# Patient Record
Sex: MALE | Race: WHITE | Employment: OTHER | URBAN - METROPOLITAN AREA
[De-identification: names, ages, dates, MRNs, and addresses within clinical notes are randomized per-mention and may not be internally consistent; named-entity substitution may affect disease eponyms.]

---

## 2018-07-01 ENCOUNTER — APPOINTMENT (OUTPATIENT)
Dept: GENERAL RADIOLOGY | Age: 53
End: 2018-07-01
Attending: PHYSICIAN ASSISTANT
Payer: COMMERCIAL

## 2018-07-01 ENCOUNTER — HOSPITAL ENCOUNTER (EMERGENCY)
Age: 53
Discharge: OTHER INSTITUTION | End: 2018-07-01
Attending: EMERGENCY MEDICINE | Admitting: EMERGENCY MEDICINE
Payer: COMMERCIAL

## 2018-07-01 ENCOUNTER — HOSPITAL ENCOUNTER (INPATIENT)
Age: 53
LOS: 10 days | Discharge: HOME | DRG: 025 | End: 2018-07-11
Attending: HOSPITALIST | Admitting: INTERNAL MEDICINE
Payer: COMMERCIAL

## 2018-07-01 ENCOUNTER — APPOINTMENT (OUTPATIENT)
Dept: CT IMAGING | Age: 53
End: 2018-07-01
Attending: PHYSICIAN ASSISTANT
Payer: COMMERCIAL

## 2018-07-01 VITALS
HEART RATE: 77 BPM | DIASTOLIC BLOOD PRESSURE: 119 MMHG | TEMPERATURE: 98.5 F | OXYGEN SATURATION: 94 % | HEIGHT: 75 IN | RESPIRATION RATE: 20 BRPM | BODY MASS INDEX: 26.73 KG/M2 | SYSTOLIC BLOOD PRESSURE: 211 MMHG | WEIGHT: 215 LBS

## 2018-07-01 DIAGNOSIS — I62.00 SUBDURAL HEMORRHAGE (HCC): Primary | ICD-10-CM

## 2018-07-01 PROBLEM — S06.5XAA SUBDURAL HEMATOMA: Status: ACTIVE | Noted: 2018-07-01

## 2018-07-01 LAB
ALBUMIN SERPL-MCNC: 4.4 G/DL (ref 3.5–5)
ALBUMIN/GLOB SERPL: 1.1 {RATIO} (ref 1.1–2.2)
ALP SERPL-CCNC: 141 U/L (ref 45–117)
ALT SERPL-CCNC: 25 U/L (ref 12–78)
ANION GAP SERPL CALC-SCNC: 7 MMOL/L (ref 5–15)
APTT PPP: 30 SEC (ref 22.1–32)
AST SERPL-CCNC: 20 U/L (ref 15–37)
BASOPHILS # BLD: 0.1 K/UL (ref 0–0.1)
BASOPHILS NFR BLD: 1 % (ref 0–1)
BILIRUB SERPL-MCNC: 1.5 MG/DL (ref 0.2–1)
BUN SERPL-MCNC: 14 MG/DL (ref 6–20)
BUN/CREAT SERPL: 14 (ref 12–20)
CALCIUM SERPL-MCNC: 8.9 MG/DL (ref 8.5–10.1)
CHLORIDE SERPL-SCNC: 101 MMOL/L (ref 97–108)
CK MB CFR SERPL CALC: 1.8 % (ref 0–2.5)
CK MB SERPL-MCNC: 2.5 NG/ML (ref 5–25)
CK SERPL-CCNC: 137 U/L (ref 39–308)
CO2 SERPL-SCNC: 29 MMOL/L (ref 21–32)
CREAT SERPL-MCNC: 0.97 MG/DL (ref 0.7–1.3)
CRP SERPL-MCNC: 0.32 MG/DL (ref 0–0.6)
DIFFERENTIAL METHOD BLD: ABNORMAL
EOSINOPHIL # BLD: 0.2 K/UL (ref 0–0.4)
EOSINOPHIL NFR BLD: 1 % (ref 0–7)
ERYTHROCYTE [DISTWIDTH] IN BLOOD BY AUTOMATED COUNT: 12.2 % (ref 11.5–14.5)
ERYTHROCYTE [SEDIMENTATION RATE] IN BLOOD: 7 MM/HR (ref 0–20)
GLOBULIN SER CALC-MCNC: 3.9 G/DL (ref 2–4)
GLUCOSE BLD STRIP.AUTO-MCNC: 117 MG/DL (ref 65–100)
GLUCOSE SERPL-MCNC: 110 MG/DL (ref 65–100)
HCT VFR BLD AUTO: 45.8 % (ref 36.6–50.3)
HGB BLD-MCNC: 16.3 G/DL (ref 12.1–17)
IMM GRANULOCYTES # BLD: 0 K/UL (ref 0–0.04)
IMM GRANULOCYTES NFR BLD AUTO: 0 % (ref 0–0.5)
INR BLD: 1 (ref 0.9–1.2)
INR PPP: 1 (ref 0.9–1.1)
LYMPHOCYTES # BLD: 2.1 K/UL (ref 0.8–3.5)
LYMPHOCYTES NFR BLD: 18 % (ref 12–49)
MCH RBC QN AUTO: 30.4 PG (ref 26–34)
MCHC RBC AUTO-ENTMCNC: 35.6 G/DL (ref 30–36.5)
MCV RBC AUTO: 85.3 FL (ref 80–99)
MONOCYTES # BLD: 1 K/UL (ref 0–1)
MONOCYTES NFR BLD: 9 % (ref 5–13)
NEUTS SEG # BLD: 8 K/UL (ref 1.8–8)
NEUTS SEG NFR BLD: 71 % (ref 32–75)
NRBC # BLD: 0 K/UL (ref 0–0.01)
NRBC BLD-RTO: 0 PER 100 WBC
PLATELET # BLD AUTO: 258 K/UL (ref 150–400)
PMV BLD AUTO: 9.8 FL (ref 8.9–12.9)
POTASSIUM SERPL-SCNC: 3.2 MMOL/L (ref 3.5–5.1)
PROT SERPL-MCNC: 8.3 G/DL (ref 6.4–8.2)
PROTHROMBIN TIME: 10.6 SEC (ref 9–11.1)
RBC # BLD AUTO: 5.37 M/UL (ref 4.1–5.7)
SERVICE CMNT-IMP: ABNORMAL
SODIUM SERPL-SCNC: 137 MMOL/L (ref 136–145)
THERAPEUTIC RANGE,PTTT: NORMAL SECS (ref 58–77)
TROPONIN I SERPL-MCNC: <0.05 NG/ML
TROPONIN I SERPL-MCNC: <0.05 NG/ML
WBC # BLD AUTO: 11.2 K/UL (ref 4.1–11.1)

## 2018-07-01 PROCEDURE — 74011000250 HC RX REV CODE- 250: Performed by: HOSPITALIST

## 2018-07-01 PROCEDURE — 99285 EMERGENCY DEPT VISIT HI MDM: CPT

## 2018-07-01 PROCEDURE — 74011250636 HC RX REV CODE- 250/636: Performed by: NEUROLOGICAL SURGERY

## 2018-07-01 PROCEDURE — 84484 ASSAY OF TROPONIN QUANT: CPT | Performed by: INTERNAL MEDICINE

## 2018-07-01 PROCEDURE — 80053 COMPREHEN METABOLIC PANEL: CPT | Performed by: PHYSICIAN ASSISTANT

## 2018-07-01 PROCEDURE — 74011250637 HC RX REV CODE- 250/637: Performed by: INTERNAL MEDICINE

## 2018-07-01 PROCEDURE — 85652 RBC SED RATE AUTOMATED: CPT | Performed by: INTERNAL MEDICINE

## 2018-07-01 PROCEDURE — 74011000250 HC RX REV CODE- 250: Performed by: EMERGENCY MEDICINE

## 2018-07-01 PROCEDURE — 74011250636 HC RX REV CODE- 250/636: Performed by: HOSPITALIST

## 2018-07-01 PROCEDURE — 85730 THROMBOPLASTIN TIME PARTIAL: CPT | Performed by: PHYSICIAN ASSISTANT

## 2018-07-01 PROCEDURE — 82962 GLUCOSE BLOOD TEST: CPT

## 2018-07-01 PROCEDURE — 85025 COMPLETE CBC W/AUTO DIFF WBC: CPT | Performed by: PHYSICIAN ASSISTANT

## 2018-07-01 PROCEDURE — 86140 C-REACTIVE PROTEIN: CPT | Performed by: INTERNAL MEDICINE

## 2018-07-01 PROCEDURE — 84484 ASSAY OF TROPONIN QUANT: CPT | Performed by: PHYSICIAN ASSISTANT

## 2018-07-01 PROCEDURE — 85610 PROTHROMBIN TIME: CPT

## 2018-07-01 PROCEDURE — 74011250636 HC RX REV CODE- 250/636: Performed by: INTERNAL MEDICINE

## 2018-07-01 PROCEDURE — 85610 PROTHROMBIN TIME: CPT | Performed by: PHYSICIAN ASSISTANT

## 2018-07-01 PROCEDURE — 65610000006 HC RM INTENSIVE CARE

## 2018-07-01 PROCEDURE — 74011000258 HC RX REV CODE- 258: Performed by: NEUROLOGICAL SURGERY

## 2018-07-01 PROCEDURE — 93005 ELECTROCARDIOGRAM TRACING: CPT

## 2018-07-01 PROCEDURE — 74011000250 HC RX REV CODE- 250: Performed by: INTERNAL MEDICINE

## 2018-07-01 PROCEDURE — 82550 ASSAY OF CK (CPK): CPT | Performed by: INTERNAL MEDICINE

## 2018-07-01 PROCEDURE — 71045 X-RAY EXAM CHEST 1 VIEW: CPT

## 2018-07-01 PROCEDURE — 74011000250 HC RX REV CODE- 250

## 2018-07-01 PROCEDURE — 74011250636 HC RX REV CODE- 250/636: Performed by: EMERGENCY MEDICINE

## 2018-07-01 PROCEDURE — 36415 COLL VENOUS BLD VENIPUNCTURE: CPT | Performed by: PHYSICIAN ASSISTANT

## 2018-07-01 PROCEDURE — 96374 THER/PROPH/DIAG INJ IV PUSH: CPT

## 2018-07-01 PROCEDURE — 36415 COLL VENOUS BLD VENIPUNCTURE: CPT | Performed by: INTERNAL MEDICINE

## 2018-07-01 PROCEDURE — 70450 CT HEAD/BRAIN W/O DYE: CPT

## 2018-07-01 RX ORDER — ACETAMINOPHEN 325 MG/1
650 TABLET ORAL
Status: DISCONTINUED | OUTPATIENT
Start: 2018-07-01 | End: 2018-07-04

## 2018-07-01 RX ORDER — IBUPROFEN 800 MG/1
800 TABLET ORAL
COMMUNITY
End: 2018-07-11

## 2018-07-01 RX ORDER — LABETALOL HYDROCHLORIDE 5 MG/ML
INJECTION, SOLUTION INTRAVENOUS
Status: COMPLETED
Start: 2018-07-01 | End: 2018-07-01

## 2018-07-01 RX ORDER — BISACODYL 5 MG
5 TABLET, DELAYED RELEASE (ENTERIC COATED) ORAL DAILY PRN
Status: DISCONTINUED | OUTPATIENT
Start: 2018-07-01 | End: 2018-07-11 | Stop reason: HOSPADM

## 2018-07-01 RX ORDER — OXYCODONE HYDROCHLORIDE 5 MG/1
5 TABLET ORAL
Status: DISCONTINUED | OUTPATIENT
Start: 2018-07-01 | End: 2018-07-04

## 2018-07-01 RX ORDER — ONDANSETRON 2 MG/ML
4 INJECTION INTRAMUSCULAR; INTRAVENOUS
Status: DISCONTINUED | OUTPATIENT
Start: 2018-07-01 | End: 2018-07-03

## 2018-07-01 RX ORDER — NALOXONE HYDROCHLORIDE 0.4 MG/ML
0.4 INJECTION, SOLUTION INTRAMUSCULAR; INTRAVENOUS; SUBCUTANEOUS AS NEEDED
Status: DISCONTINUED | OUTPATIENT
Start: 2018-07-01 | End: 2018-07-04

## 2018-07-01 RX ORDER — LABETALOL HYDROCHLORIDE 5 MG/ML
20 INJECTION, SOLUTION INTRAVENOUS
Status: COMPLETED | OUTPATIENT
Start: 2018-07-01 | End: 2018-07-01

## 2018-07-01 RX ORDER — SODIUM CHLORIDE 9 MG/ML
75 INJECTION, SOLUTION INTRAVENOUS CONTINUOUS
Status: DISCONTINUED | OUTPATIENT
Start: 2018-07-01 | End: 2018-07-03

## 2018-07-01 RX ORDER — DEXAMETHASONE SODIUM PHOSPHATE 4 MG/ML
4 INJECTION, SOLUTION INTRA-ARTICULAR; INTRALESIONAL; INTRAMUSCULAR; INTRAVENOUS; SOFT TISSUE EVERY 6 HOURS
Status: DISCONTINUED | OUTPATIENT
Start: 2018-07-02 | End: 2018-07-02

## 2018-07-01 RX ORDER — POTASSIUM CHLORIDE 14.9 MG/ML
10 INJECTION INTRAVENOUS
Status: COMPLETED | OUTPATIENT
Start: 2018-07-01 | End: 2018-07-04

## 2018-07-01 RX ADMIN — SODIUM CHLORIDE 11 MG/HR: 900 INJECTION, SOLUTION INTRAVENOUS at 20:10

## 2018-07-01 RX ADMIN — SODIUM CHLORIDE 8.5 MG/HR: 900 INJECTION, SOLUTION INTRAVENOUS at 22:47

## 2018-07-01 RX ADMIN — ACETAMINOPHEN 650 MG: 325 TABLET ORAL at 21:23

## 2018-07-01 RX ADMIN — SODIUM CHLORIDE 75 ML/HR: 900 INJECTION, SOLUTION INTRAVENOUS at 21:22

## 2018-07-01 RX ADMIN — POTASSIUM CHLORIDE 10 MEQ: 200 INJECTION, SOLUTION INTRAVENOUS at 23:24

## 2018-07-01 RX ADMIN — SODIUM CHLORIDE 500 MG: 900 INJECTION, SOLUTION INTRAVENOUS at 21:23

## 2018-07-01 RX ADMIN — LABETALOL HYDROCHLORIDE 20 MG: 5 INJECTION, SOLUTION INTRAVENOUS at 17:10

## 2018-07-01 RX ADMIN — ONDANSETRON 4 MG: 2 INJECTION INTRAMUSCULAR; INTRAVENOUS at 23:43

## 2018-07-01 RX ADMIN — OXYCODONE HYDROCHLORIDE 5 MG: 5 TABLET ORAL at 23:45

## 2018-07-01 RX ADMIN — FAMOTIDINE 20 MG: 10 INJECTION, SOLUTION INTRAVENOUS at 21:23

## 2018-07-01 RX ADMIN — DEXAMETHASONE SODIUM PHOSPHATE 4 MG: 4 INJECTION, SOLUTION INTRAMUSCULAR; INTRAVENOUS at 23:23

## 2018-07-01 NOTE — IP AVS SNAPSHOT
[address] [facility], [facility] [address] [place] [address] 
[phone] Patient: Bandar Garcia MRN: [id number] :1965 About your hospitalization You were admitted on:  2018 You last received care in the:  [facility] 6S NEURO-SCI TELE You were discharged on:  2018 Why you were hospitalized Your primary diagnosis was:  Subdural Hematoma (Hcc) Follow-up Information Follow up With Details Comments Contact Info Jeff Nettles MD On 2018 at 11:00 for staple removal. [place] [address] [facility] [id number] [phone] None   None ([phone]) Patient stated that they have no PCP Discharge Orders None A check corrina indicates which time of day the medication should be taken. My Medications START taking these medications Instructions Each Dose to Equal  
 Morning Noon Evening Bedtime  
 acetaminophen 325 mg tablet Commonly known as:  TYLENOL Your last dose was: Your next dose is: Take 2 Tabs by mouth every four (4) hours as needed. 650 mg  
    
   
   
   
  
 amLODIPine 10 mg tablet Commonly known as:  Seble Darting Start taking on:  2018 Your last dose was: Your next dose is: Take 1 Tab by mouth daily. 10 mg  
    
   
   
   
  
 carvedilol 6.25 mg tablet Commonly known as:  Ozie Bitter Your last dose was: Your next dose is: Take 1 Tab by mouth two (2) times daily (with meals). 6.25 mg  
    
   
   
   
  
 famotidine 20 mg tablet Commonly known as:  PEPCID Your last dose was: Your next dose is: Take 1 Tab by mouth every twelve (12) hours. 20 mg  
    
   
   
   
  
 hydrALAZINE 25 mg tablet Commonly known as:  APRESOLINE Your last dose was: Your next dose is: Take 1 Tab by mouth three (3) times daily.   
 25 mg  
    
   
 levETIRAcetam 1,000 mg tablet Your last dose was: Your next dose is: Take 1 Tab by mouth every twelve (12) hours. 1000 mg  
    
   
   
   
  
 lisinopril 40 mg tablet Commonly known as:  Ren Daniels Start taking on:  7/12/2018 Your last dose was: Your next dose is: Take 1 Tab by mouth daily. 40 mg  
    
   
   
   
  
  
STOP taking these medications   
 ibuprofen 800 mg tablet Commonly known as:  MOTRIN Where to Get Your Medications Information on where to get these meds will be given to you by the nurse or doctor. ! Ask your nurse or doctor about these medications  
  acetaminophen 325 mg tablet  
 amLODIPine 10 mg tablet  
 carvedilol 6.25 mg tablet  
 famotidine 20 mg tablet  
 hydrALAZINE 25 mg tablet  
 levETIRAcetam 1,000 mg tablet  
 lisinopril 40 mg tablet Discharge Instructions After Hospital Care Plan:  Discharge Instructions [facility] Patient Name: Zaria Lynch Date of procedure: 7/3/2018  Date of discharge: 7/9/2018 Procedure: Procedure(s): LEFT FRONTAL TEMPORAL CRANIOTOMY FOR SUBDURAL HEMATOMA  PCP: None Follow up appointments Follow up with your neurosurgeon in 10-14 days. Call [phone] to make an appointment as soon as you get home from the hospital.  Follow-up care is a key part of your treatment and safety. Be sure to make and go to all appointments, and call your doctor if you are having problems. It's also a good idea to know your test results and keep a list of the medicines you take. A craniotomy is surgery to open your skull to fix a problem in your brain. It can be done for many reasons. For example, you may need a craniotomy if your brain or blood vessels are damaged or if you have a tumor or an infection in your brain. You will probably feel very tired for several weeks after surgery.  You may also have headaches or problems concentrating. It can take 4 to 8 weeks to recover from surgery. Your cuts (incisions) may be sore for about 5 days after surgery. You may also have numbness and shooting pains near your wound, or swelling and bruising around your eyes. As your wound starts to heal, it may begin to itch. Medicines and ice packs can help with headaches, pain, swelling, and itching. The stitches that hold your incisions together may go away on their own or will be removed in 7 to 10 days. This depends on the type of stitches the doctor uses. Staples are usually removed in 10-14 days. It is common for your scalp to swell with fluid. After the swelling goes down, you may have a dent in your head. Some kinds of plates stay attached to hold the skull flap to your head. If your head was shaved, you may wear clean hats or scarves on your head until your hair grows back. This care sheet gives you a general idea about how long it will take for you to recover. But each person recovers at a different pace. Follow the steps below to get better as quickly as possible. When to call your Neurosurgeon:  You have trouble thinking clearly.  You are sleeping more than you are awake.  You have a fever with a stiff neck or a severe headache.  You have any sudden vision changes.  Nausea or vomiting, severe headache.  Loss of bowel or bladder function, inability to urinate.  Increased weakness-greater than before your surgery.  Severe pain or pain not relieved by medications.  Signs of a blood clot in your leg-calf pain, tenderness, redness, swelling of lower leg.  Signs of infection-if your incision is red; continues to have drainage; drainage has a foul odor or if you have a persistent fever over 101 degrees for 24 hours.  You fall and hit your head. When to call your Primary Care Physician:  Concerns about medical conditions such as diabetes, high blood pressure, asthma, congestive heart failure.  Call if blood sugars are elevated, persistent headache or dizziness, coughing or congestion, constipation or diarrhea, burning with urination, abnormal heart rate. When to call 911 and go to the nearest emergency room:  Acute onset of chest pain, shortness of breath, difficulty breathing  You passed out (lost consciousness).  It is hard to think, move, speak, or see.  Your body is jerking or shaking. Activity ? Rest when you feel tired. It is normal to want to sleep during the day. It is a good idea to plan to take a nap every day. Getting enough sleep will help you recover. ? Try not to lie flat when you rest or sleep. You can use a wedge pillow, or you can put a rolled towel or foam padding under your pillow. You can also raise the head of your bed by putting bricks or wooden blocks under the bed legs. ? After lying down, bring your head up slowly. This can prevent headaches or dizziness. ? Try to walk each day. Start by walking a little more than you did the day before. Bit by bit, increase the amount you walk. Walking boosts blood flow and helps prevent pneumonia and constipation. ? Avoid heavy lifting until your doctor says it is okay. ? Do not drive for 2 to 3 weeks or until your doctor says it is okay. When you begin driving again, start with short, familiar routes in the daytime. ? Ask your doctor if it is safe for you to travel by plane. ? Avoid risky activities, such as climbing a ladder, for 3 months after surgery. ? Avoid strenuous activities, such as bicycle riding, jogging, weight lifting, or aerobic exercise, for 3 months or until your doctor says it is okay. ? Do not play any rough or contact sports for 3 months or until your doctor says it is okay. ? You may engage in sexual activity. Diet ? Resume usual diet; drink plenty of fluids; eat foods high in fiber ? It is important to have regular bowel movements.   Pain medications may cause constipation. You may want to take a stool softener (such as Senokot-S or Colace) to prevent constipation. ? If constipation occurs, take a laxative (such as Dulcolax tablets, Milk of Magnesia, or a suppository). Laxatives should only be used if the above preventable measures have failed and you still have not had a bowel movement after three days ? Try to avoid constipation and straining with bowel movements. Incision Care     You can wash your hair 2 to 3 days after your surgery. But do not soak your head or swim for 2 to 3 weeks.  Do not dye or color your hair for 4 weeks after your surgery.  Do not rub or apply any lotions or ointments to your incision site.  Do not scrub your wound Medicines  If your doctor or nurse practitioner prescribed antibiotics, take them as directed. Do not stop taking them just because you feel better. You need to take the full course of antibiotics.  If you get medicines to prevent seizures, take them exactly as directed.  If the doctor or nurse practitioner gave you a prescription medicine for pain, take it as prescribed.  If you are not taking a prescription pain medicine, ask your doctor or nurse practitioner if you can take an over-the-counter medicine.   
Pain Medication Safety DO: 
 Read the Medication Guide  Take your medicine exactly as prescribed  Store your medicine away from children and in a safe place  Call your healthcare provider for medical advice about side effects. You may report side effects to FDA at 7-306-FDA-3231.  Please be aware that many medications contain Tylenol. We do not want you to over medicate so please read the information below as a guide. Do not take more than 4 Grams of Tylenol in a 24 hour period if you are under age 79 or 3 Grams in 24 hours if you are over 79years old. (There are 1000 milligrams in one Gram) o Percocet contains 325 mg of Tylenol per tablet (do not take more than 12 tablets in 24 hours) 
o Lortab contains 500 mg of Tylenol per tablet (do not take more than 8 tablets in 24 hours) o Norco contains 325 mg of Tylenol per tablet (do not take more than 12 tablets in 24 hours). DO NOT: 
 Do not give your medicine to others.  Do not take medicine unless it was prescribed for you.  Do not stop taking your medicine without talking to your healthcare provider.  Do not break, chew, crush, dissolve, or inject your medicine. If you cannot swallow your medicine whole, talk to your healthcare provider.  Do not drink alcohol while taking this medicine.  Do not take anti-inflammatory medications or aspirin unless instructed by your physician. Check BP every day, write down numbers, take to PCP for medication management. Goal BP top number <160 Introducing [facility] [place] [facility] SERVICES! Bryan Vicente introduces WorkshopLive patient portal. Now you can access parts of your medical record, email your doctor's office, and request medication refills online. 1. In your internet browser, go to [email or web address] 2. Click on the First Time User? Click Here link in the Sign In box. You will see the New Member Sign Up page. 3. Enter your WorkshopLive Access Code exactly as it appears below. You will not need to use this code after youve completed the sign-up process. If you do not sign up before the expiration date, you must request a new code. · [employer]Live Access Code: [email or web address] Expires: 9/29/2018  4:44 PM 
 
4. Enter the last four digits of your Social Security Number (xxxx) and Date of Birth (mm/dd/yyyy) as indicated and click Submit. You will be taken to the next sign-up page. 5. Create a WorkshopLive ID. This will be your WorkshopLive login ID and cannot be changed, so think of one that is secure and easy to remember. 6. Create a [employer]Live password. You can change your password at any time. 7. Enter your Password Reset Question and Answer.  This can be used at a later time if you forget your password. 8. Enter your e-mail address. You will receive e-mail notification when new information is available in [address] 9. Click Sign Up. You can now view and download portions of your medical record. 10. Click the Download Summary menu link to download a portable copy of your medical information. If you have questions, please visit the Frequently Asked Questions section of the [employer] website. Remember, Begun is NOT to be used for urgent needs. For medical emergencies, dial 911. Now available from your iPhone and Android! Introducing Vic Mcfadden As a [employer] patient, I wanted to make you aware of our electronic visit tool called Vic Mcfadden. [employer] allows you to connect within minutes with a medical provider 24 hours a day, seven days a week via a mobile device or tablet or logging into a secure website from your computer. You can access Vic Mcfadden from anywhere in the [place]. A virtual visit might be right for you when you have a simple condition and feel like you just dont want to get out of bed, or cant get away from work for an appointment, when your regular [facility] provider is not available (evenings, weekends or holidays), or when youre out of town and need minor care. Electronic visits cost only $49 and if the [employer] provider determines a prescription is needed to treat your condition, one can be electronically transmitted to a nearby pharmacy*. Please take a moment to enroll today if you have not already done so. The enrollment process is free and takes just a few minutes. To enroll, please download the [employer] jazz to your tablet or phone, or visit [email or web address] to enroll on your computer.    
And, as an [address] [place] [address] patient with a [employer] account, the results of your visits will be scanned into your electronic medical record and your primary care provider will be able to view the scanned results. We urge you to continue to see your regular [facility] provider for your ongoing medical care. And while your primary care provider may not be the one available when you seek a [employer] virtual visit, the peace of mind you get from getting a real diagnosis real time can be priceless. For more information on [employer]Space, view our Frequently Asked Questions (FAQs) at [email or web address]. Sincerely, 
 
Ariana Prakash MD 
Chief Medical Officer [employer] *:  certain medications cannot be prescribed via LapSpace Providers Seen During Your Hospitalization Provider Specialty Primary office phone Ana Hall MD Internal Medicine [phone] Usama Fitzgerald MD Internal Medicine [phone] Khang Pierre MD Internal Medicine [phone] Your Primary Care Physician (PCP) Primary Care Physician Office Phone Office Fax NONE ** None ** ** None ** You are allergic to the following No active allergies Recent Documentation Height Weight BMI Smoking Status 1.905 m 93.2 kg 25.67 kg/m2 Never Smoker Emergency Contacts Name Discharge Info Relation Home Work Mobile DISCHARGE CAREGIVER [3] Patient [10] Brenda Chambers  Spouse [3]   [phone] Patient Belongings The following personal items are in your possession at time of discharge: 
  Dental Appliances: None  Visual Aid: None      Home Medications: None   Jewelry: Ring (wedding band taped)  Clothing:  (no belongings to preop)    Other Valuables: None Please provide this summary of care documentation to your next provider. Signatures-by signing, you are acknowledging that this After Visit Summary has been reviewed with you and you have received a copy.   
  
 
  
    
    
 Patient Signature: ____________________________________________________________ Date:  ____________________________________________________________  
  
Sharon  Provider Signature:  ____________________________________________________________ Date:  ____________________________________________________________

## 2018-07-01 NOTE — ED PROVIDER NOTES
HPI Comments: Bandar Garcia is a 47 y.o. male  who presents by private vehicle to ER with c/o Patient presents with:  Migraine  Dysarthria. Patient presents with complaints of headache x 1 week with intermittent slurred speech, that started yesterday, and lasted approximately 10 minutes. Patient also had episode of slurred speech today approximately 1-2 hours ago that lasted a few minutes. Patient with no symptoms other then headache at this time on arrival. Patient denies any recent head trauma, or significant medical history. Patient does not take any daily medications. Patient reports last week he was swimming and diving down into 12 foot depths and the headache started that evening. Patient denies chest pain or shortness of breath. He specifically denies any fevers, chills, nausea, vomiting, chest pain, shortness of breath,  rash, diarrhea, abdominal pain, urinary/bowel changes, sweating or weight loss. PCP: None   PMHx significant for: History reviewed. No pertinent past medical history. PSHx significant for: History reviewed. No pertinent surgical history. Social Hx: Tobacco use: Smoking status: Never Smoker                                                              Smokeless status: Never Used                      ; EtOH use: The patient states he drinks 0 per week.; Illicit Drug use: Allergies:  No Known Allergies    There are no other complaints, changes or physical findings at this time. Patient is a 47 y.o. male presenting with migraines and context. The history is provided by the patient. Migraine    This is a new problem. The current episode started more than 2 days ago. The problem occurs constantly. The problem has not changed since onset. The headache is aggravated by nothing. The pain is located in the left unilateral region. The quality of the pain is described as sharp. The pain is at a severity of 3/10. The pain is mild.  Pertinent negatives include no anorexia, no fever, no malaise/fatigue, no chest pressure, no near-syncope, no orthopnea, no palpitations, no syncope, no shortness of breath, no weakness, no tingling, no dizziness, no visual change, no nausea and no vomiting. He has tried nothing for the symptoms. Dysarthria   Primary symptoms include speech difficulty. Pertinent negatives include no visual change. Associated symptoms include headaches. Pertinent negatives include no shortness of breath, no vomiting and no nausea. No past medical history on file. No past surgical history on file. No family history on file. Social History     Social History    Marital status: N/A     Spouse name: N/A    Number of children: N/A    Years of education: N/A     Occupational History    Not on file. Social History Main Topics    Smoking status: Not on file    Smokeless tobacco: Not on file    Alcohol use Not on file    Drug use: Not on file    Sexual activity: Not on file     Other Topics Concern    Not on file     Social History Narrative         ALLERGIES: Review of patient's allergies indicates no known allergies. Review of Systems   Constitutional: Negative. Negative for fever and malaise/fatigue. HENT: Negative. Eyes: Negative. Respiratory: Negative. Negative for shortness of breath. Cardiovascular: Negative. Negative for palpitations, orthopnea, syncope and near-syncope. Gastrointestinal: Negative. Negative for anorexia, nausea and vomiting. Endocrine: Negative. Genitourinary: Negative. Musculoskeletal: Negative. Skin: Negative. Allergic/Immunologic: Negative. Neurological: Positive for speech difficulty and headaches. Negative for dizziness, tingling and weakness. Hematological: Negative. Psychiatric/Behavioral: Negative. All other systems reviewed and are negative.       Vitals:    07/01/18 1646   Pulse: 95   Resp: 16   Temp: 98.5 °F (36.9 °C)   SpO2: 96%   Weight: 97.5 kg (215 lb)   Height: 6' 3\" (1.905 m) Physical Exam   Constitutional: He is oriented to person, place, and time. He appears well-developed and well-nourished. HENT:   Head: Normocephalic and atraumatic. Right Ear: External ear normal.   Left Ear: External ear normal.   Mouth/Throat: Oropharynx is clear and moist. No oropharyngeal exudate. Eyes: Conjunctivae and EOM are normal. Pupils are equal, round, and reactive to light. Right eye exhibits no discharge. Left eye exhibits no discharge. Right conjunctiva is not injected. Right conjunctiva has no hemorrhage. Left conjunctiva is not injected. Left conjunctiva has no hemorrhage. No scleral icterus. Right eye exhibits normal extraocular motion and no nystagmus. Left eye exhibits normal extraocular motion and no nystagmus. Right pupil is round and reactive. Left pupil is round and reactive. Pupils are equal.   Neck: Normal range of motion. Neck supple. No tracheal deviation present. No thyromegaly present. Cardiovascular: Normal rate, regular rhythm, normal heart sounds and intact distal pulses. No murmur heard. Pulmonary/Chest: Effort normal and breath sounds normal. No respiratory distress. He has no wheezes. He has no rales. Abdominal: Soft. Bowel sounds are normal. He exhibits no distension. There is no tenderness. There is no rebound and no guarding. Musculoskeletal: Normal range of motion. He exhibits no edema or tenderness. Lymphadenopathy:     He has no cervical adenopathy. Neurological: He is alert and oriented to person, place, and time. He has normal strength. No cranial nerve deficit or sensory deficit. Coordination normal. GCS eye subscore is 4. GCS verbal subscore is 5. GCS motor subscore is 6. Skin: Skin is warm. No rash noted. No erythema. Psychiatric: He has a normal mood and affect. His behavior is normal. Judgment and thought content normal.   Nursing note and vitals reviewed.        MDM  Number of Diagnoses or Management Options  Subdural hemorrhage ([facility] [address]): Diagnosis management comments: The patient presents with headache with a differential diagnosis of  cerebral hemorrhage, CVA, migraine, tension headache and vascular headache. Amount and/or Complexity of Data Reviewed  Clinical lab tests: ordered and reviewed  Tests in the radiology section of CPT®: ordered and reviewed  Tests in the medicine section of CPT®: ordered and reviewed  Discuss the patient with other providers: yes (Attending- Dr. Masha Bella who saw patient and agrees with plan)    Risk of Complications, Morbidity, and/or Mortality  Presenting problems: high  Diagnostic procedures: high  Management options: high    Critical Care  Total time providing critical care: 30-74 minutes    Patient Progress  Patient progress: stable        ED Course       Procedures                   Total critical care time spent exclusive of procedures:  35 minutes      CONSULT NOTE:   4:52 PM  Shiv Mitchell PA-C spoke with Dr. Shayna Dockery,   Specialty: Neurology  Discussed pt's hx, disposition, and available diagnostic and imaging results. Reviewed care plans. Consultant agrees with plans as outlined. Recommended admission for TIA work up. CONSULT NOTE:   5:25 PM  Dr. Masha Bella spoke with Dr. Lane Geiger,   Specialty: Neurosurgery  Discussed pt's hx, disposition, and available diagnostic and imaging results. Reviewed care plans. Consultant agrees with plans as outlined. Will accept for admission      CONSULT NOTE:   5:31 PM  Dr. Masha Bella spoke with Dr. Wil Ramos,   Specialty: Hospitalist  Discussed pt's hx, disposition, and available diagnostic and imaging results. Reviewed care plans. Consultant agrees with plans as outlined. Will accept patient for transfer. .    ED EKG interpretation:  Rhythm: normal sinus rhythm; and sinus arrhythmia. Rate (approx.): 82; Axis: left axis deviation; P wave: normal; QRS interval: normal ; ST/T wave: normal; in  Lead: ; Other findings: abnormal ekg.    EKG documented by Mag Palm mercedez GARCIA, as interpreted by Malachi Graf MD, ED MD.        Patient transferred to [facility] for admission.

## 2018-07-01 NOTE — IP AVS SNAPSHOT
[address] 
[phone] Patient: Francy Burgos MRN: [id number] :1965 A check corrina indicates which time of day the medication should be taken. My Medications START taking these medications Instructions Each Dose to Equal  
 Morning Noon Evening Bedtime  
 acetaminophen 325 mg tablet Commonly known as:  TYLENOL Your last dose was: Your next dose is: Take 2 Tabs by mouth every four (4) hours as needed. 650 mg  
    
   
   
   
  
 amLODIPine 10 mg tablet Commonly known as:  Jamal Sigala Start taking on:  2018 Your last dose was: Your next dose is: Take 1 Tab by mouth daily. 10 mg  
    
   
   
   
  
 carvedilol 6.25 mg tablet Commonly known as:  Shelby Dome Your last dose was: Your next dose is: Take 1 Tab by mouth two (2) times daily (with meals). 6.25 mg  
    
   
   
   
  
 famotidine 20 mg tablet Commonly known as:  PEPCID Your last dose was: Your next dose is: Take 1 Tab by mouth every twelve (12) hours. 20 mg  
    
   
   
   
  
 hydrALAZINE 25 mg tablet Commonly known as:  APRESOLINE Your last dose was: Your next dose is: Take 1 Tab by mouth three (3) times daily. 25 mg  
    
   
   
   
  
 levETIRAcetam 1,000 mg tablet Your last dose was: Your next dose is: Take 1 Tab by mouth every twelve (12) hours. 1000 mg  
    
   
   
   
  
 lisinopril 40 mg tablet Commonly known as:  Dimple Fernández Start taking on:  2018 Your last dose was: Your next dose is: Take 1 Tab by mouth daily. 40 mg  
    
   
   
   
  
  
STOP taking these medications   
 ibuprofen 800 mg tablet Commonly known as:  MOTRIN Where to Get Your Medications Information on where to get these meds will be given to you by the nurse or doctor. ! Ask your nurse or doctor about these medications  
  acetaminophen 325 mg tablet  
 amLODIPine 10 mg tablet  
 carvedilol 6.25 mg tablet  
 famotidine 20 mg tablet  
 hydrALAZINE 25 mg tablet  
 levETIRAcetam 1,000 mg tablet  
 lisinopril 40 mg tablet

## 2018-07-01 NOTE — PROGRESS NOTES
Spiritual Care Assessment/Progress Note  [address]      NAME: Kingsley Rocha      MRN: [id number]  AGE: 47 y.o. SEX: male  Sikhism Affiliation:    Language:      7/1/2018     Total Time (in minutes): 10     Spiritual Assessment begun in [facility] EMERGENCY DEPT through conversation with:         []Patient        [x] Family    [] Friend(s)        Reason for Consult: Emergency Department visit, Other (comment) (Code S)     Spiritual beliefs: (Please include comment if needed)     [x] Identifies with a noe tradition:         [x] Supported by a noe community:            [] Claims no spiritual orientation:           [] Seeking spiritual identity:                [] Adheres to an individual form of spirituality:           [] Not able to assess:                           Identified resources for coping:      [] Prayer                               [] Music                  [] Guided Imagery     [] Family/friends                 [] Pet visits     [] Devotional reading                         [] Unknown     [] Other:                                            Interventions offered during this visit: (See comments for more details)          Family/Friend(s): Affirmation of noe, Iconic (affirming the presence of God/Higher Power), Prayer (assurance of)     Plan of Care:     [] Support spiritual and/or cultural needs    [] Support AMD and/or advance care planning process      [] Support grieving process   [] Coordinate Rites and/or Rituals    [] Coordination with community clergy   [] No spiritual needs identified at this time   [] Detailed Plan of Care below (See Comments)  [] Make referral to Music Therapy  [] Make referral to Pet Therapy     [] Make referral to Addiction services  [] Make referral to [facility]  [] Make referral to Spiritual Care Partner  [] No future visits requested        [x] Follow up visits as needed     Comments: responded to Code S in ER.   Mr. Mehul Bellamy' wife and their friend/ were present. Mr. Kirk Merlos was out for tests. They have no spiritual needs at this time.   Advised of  Availability   Visited by: Zander Melendez [address] [facility] Jadon ([id number])

## 2018-07-01 NOTE — ED TRIAGE NOTES
The patient states he developed a headache 1 week ago and has experienced some slurred speech yesterday lasting approximately 10 minutes. He states he also experienced an episode of slurred speech approximately 1 hour prior to arrival that was brief in nature. His speech is now clear and he is able to move all extremities without difficulty.

## 2018-07-02 ENCOUNTER — APPOINTMENT (OUTPATIENT)
Dept: CT IMAGING | Age: 53
DRG: 025 | End: 2018-07-02
Attending: NEUROLOGICAL SURGERY
Payer: COMMERCIAL

## 2018-07-02 LAB
ALBUMIN SERPL-MCNC: 4 G/DL (ref 3.5–5)
ALBUMIN/GLOB SERPL: 1.1 {RATIO} (ref 1.1–2.2)
ALP SERPL-CCNC: 130 U/L (ref 45–117)
ALT SERPL-CCNC: 23 U/L (ref 12–78)
AMPHET UR QL SCN: NEGATIVE
ANION GAP SERPL CALC-SCNC: 11 MMOL/L (ref 5–15)
AST SERPL-CCNC: 18 U/L (ref 15–37)
BARBITURATES UR QL SCN: NEGATIVE
BASOPHILS # BLD: 0 K/UL (ref 0–0.1)
BASOPHILS NFR BLD: 0 % (ref 0–1)
BENZODIAZ UR QL: NEGATIVE
BILIRUB SERPL-MCNC: 1.8 MG/DL (ref 0.2–1)
BUN SERPL-MCNC: 18 MG/DL (ref 6–20)
BUN/CREAT SERPL: 19 (ref 12–20)
CALCIUM SERPL-MCNC: 8.7 MG/DL (ref 8.5–10.1)
CANNABINOIDS UR QL SCN: NEGATIVE
CHLORIDE SERPL-SCNC: 104 MMOL/L (ref 97–108)
CHOLEST SERPL-MCNC: 147 MG/DL
CK MB CFR SERPL CALC: 1.1 % (ref 0–2.5)
CK MB SERPL-MCNC: 1.3 NG/ML (ref 5–25)
CK SERPL-CCNC: 118 U/L (ref 39–308)
CO2 SERPL-SCNC: 20 MMOL/L (ref 21–32)
COCAINE UR QL SCN: NEGATIVE
CREAT SERPL-MCNC: 0.95 MG/DL (ref 0.7–1.3)
DIFFERENTIAL METHOD BLD: ABNORMAL
DRUG SCRN COMMENT,DRGCM: NORMAL
EOSINOPHIL # BLD: 0 K/UL (ref 0–0.4)
EOSINOPHIL NFR BLD: 0 % (ref 0–7)
ERYTHROCYTE [DISTWIDTH] IN BLOOD BY AUTOMATED COUNT: 12.9 % (ref 11.5–14.5)
EST. AVERAGE GLUCOSE BLD GHB EST-MCNC: 97 MG/DL
GLOBULIN SER CALC-MCNC: 3.5 G/DL (ref 2–4)
GLUCOSE SERPL-MCNC: 166 MG/DL (ref 65–100)
HBA1C MFR BLD: 5 % (ref 4.2–6.3)
HCT VFR BLD AUTO: 42 % (ref 36.6–50.3)
HDLC SERPL-MCNC: 42 MG/DL
HDLC SERPL: 3.5 {RATIO} (ref 0–5)
HGB BLD-MCNC: 15 G/DL (ref 12.1–17)
IMM GRANULOCYTES # BLD: 0 K/UL (ref 0–0.04)
IMM GRANULOCYTES NFR BLD AUTO: 0 % (ref 0–0.5)
LDLC SERPL CALC-MCNC: 89.8 MG/DL (ref 0–100)
LIPID PROFILE,FLP: NORMAL
LYMPHOCYTES # BLD: 0.8 K/UL (ref 0.8–3.5)
LYMPHOCYTES NFR BLD: 7 % (ref 12–49)
MAGNESIUM SERPL-MCNC: 2.1 MG/DL (ref 1.6–2.4)
MCH RBC QN AUTO: 31.4 PG (ref 26–34)
MCHC RBC AUTO-ENTMCNC: 35.7 G/DL (ref 30–36.5)
MCV RBC AUTO: 88.1 FL (ref 80–99)
METHADONE UR QL: NEGATIVE
MONOCYTES # BLD: 0.2 K/UL (ref 0–1)
MONOCYTES NFR BLD: 2 % (ref 5–13)
NEUTS SEG # BLD: 10.3 K/UL (ref 1.8–8)
NEUTS SEG NFR BLD: 91 % (ref 32–75)
NRBC # BLD: 0 K/UL (ref 0–0.01)
NRBC BLD-RTO: 0 PER 100 WBC
OPIATES UR QL: NEGATIVE
PCP UR QL: NEGATIVE
PHOSPHATE SERPL-MCNC: 2 MG/DL (ref 2.6–4.7)
PLATELET # BLD AUTO: 222 K/UL (ref 150–400)
PMV BLD AUTO: 10.1 FL (ref 8.9–12.9)
POTASSIUM SERPL-SCNC: 4.1 MMOL/L (ref 3.5–5.1)
POTASSIUM SERPL-SCNC: 4.2 MMOL/L (ref 3.5–5.1)
PROT SERPL-MCNC: 7.5 G/DL (ref 6.4–8.2)
RBC # BLD AUTO: 4.77 M/UL (ref 4.1–5.7)
RBC MORPH BLD: ABNORMAL
SODIUM SERPL-SCNC: 135 MMOL/L (ref 136–145)
TRIGL SERPL-MCNC: 76 MG/DL (ref ?–150)
TROPONIN I SERPL-MCNC: <0.05 NG/ML
TSH SERPL DL<=0.05 MIU/L-ACNC: 0.54 UIU/ML (ref 0.36–3.74)
VLDLC SERPL CALC-MCNC: 15.2 MG/DL
WBC # BLD AUTO: 11.3 K/UL (ref 4.1–11.1)

## 2018-07-02 PROCEDURE — 74011636320 HC RX REV CODE- 636/320: Performed by: HOSPITALIST

## 2018-07-02 PROCEDURE — 74011250636 HC RX REV CODE- 250/636: Performed by: NEUROLOGICAL SURGERY

## 2018-07-02 PROCEDURE — 80061 LIPID PANEL: CPT | Performed by: INTERNAL MEDICINE

## 2018-07-02 PROCEDURE — 74011250636 HC RX REV CODE- 250/636: Performed by: INTERNAL MEDICINE

## 2018-07-02 PROCEDURE — 84100 ASSAY OF PHOSPHORUS: CPT | Performed by: INTERNAL MEDICINE

## 2018-07-02 PROCEDURE — 84443 ASSAY THYROID STIM HORMONE: CPT | Performed by: INTERNAL MEDICINE

## 2018-07-02 PROCEDURE — 92610 EVALUATE SWALLOWING FUNCTION: CPT | Performed by: SPEECH-LANGUAGE PATHOLOGIST

## 2018-07-02 PROCEDURE — 74011000258 HC RX REV CODE- 258: Performed by: NEUROLOGICAL SURGERY

## 2018-07-02 PROCEDURE — 74011000250 HC RX REV CODE- 250: Performed by: FAMILY MEDICINE

## 2018-07-02 PROCEDURE — 70450 CT HEAD/BRAIN W/O DYE: CPT

## 2018-07-02 PROCEDURE — 84484 ASSAY OF TROPONIN QUANT: CPT | Performed by: INTERNAL MEDICINE

## 2018-07-02 PROCEDURE — 84132 ASSAY OF SERUM POTASSIUM: CPT | Performed by: INTERNAL MEDICINE

## 2018-07-02 PROCEDURE — 74011250636 HC RX REV CODE- 250/636: Performed by: FAMILY MEDICINE

## 2018-07-02 PROCEDURE — 82550 ASSAY OF CK (CPK): CPT | Performed by: INTERNAL MEDICINE

## 2018-07-02 PROCEDURE — 65610000006 HC RM INTENSIVE CARE

## 2018-07-02 PROCEDURE — 70496 CT ANGIOGRAPHY HEAD: CPT

## 2018-07-02 PROCEDURE — 77030018836 HC SOL IRR NACL ICUM -A

## 2018-07-02 PROCEDURE — 36415 COLL VENOUS BLD VENIPUNCTURE: CPT | Performed by: INTERNAL MEDICINE

## 2018-07-02 PROCEDURE — 80053 COMPREHEN METABOLIC PANEL: CPT | Performed by: INTERNAL MEDICINE

## 2018-07-02 PROCEDURE — 77010033678 HC OXYGEN DAILY

## 2018-07-02 PROCEDURE — 74011000258 HC RX REV CODE- 258: Performed by: HOSPITALIST

## 2018-07-02 PROCEDURE — 74011000250 HC RX REV CODE- 250: Performed by: INTERNAL MEDICINE

## 2018-07-02 PROCEDURE — 85025 COMPLETE CBC W/AUTO DIFF WBC: CPT | Performed by: INTERNAL MEDICINE

## 2018-07-02 PROCEDURE — 83735 ASSAY OF MAGNESIUM: CPT | Performed by: INTERNAL MEDICINE

## 2018-07-02 PROCEDURE — 80307 DRUG TEST PRSMV CHEM ANLYZR: CPT | Performed by: INTERNAL MEDICINE

## 2018-07-02 PROCEDURE — 74011250637 HC RX REV CODE- 250/637: Performed by: INTERNAL MEDICINE

## 2018-07-02 PROCEDURE — 83036 HEMOGLOBIN GLYCOSYLATED A1C: CPT | Performed by: INTERNAL MEDICINE

## 2018-07-02 RX ORDER — SODIUM CHLORIDE 0.9 % (FLUSH) 0.9 %
SYRINGE (ML) INJECTION
Status: COMPLETED
Start: 2018-07-02 | End: 2018-07-02

## 2018-07-02 RX ORDER — FENTANYL CITRATE 50 UG/ML
25 INJECTION, SOLUTION INTRAMUSCULAR; INTRAVENOUS
Status: DISCONTINUED | OUTPATIENT
Start: 2018-07-02 | End: 2018-07-04

## 2018-07-02 RX ORDER — HYDRALAZINE HYDROCHLORIDE 20 MG/ML
10 INJECTION INTRAMUSCULAR; INTRAVENOUS
Status: DISCONTINUED | OUTPATIENT
Start: 2018-07-02 | End: 2018-07-04

## 2018-07-02 RX ORDER — SODIUM CHLORIDE 0.9 % (FLUSH) 0.9 %
10 SYRINGE (ML) INJECTION
Status: COMPLETED | OUTPATIENT
Start: 2018-07-02 | End: 2018-07-02

## 2018-07-02 RX ADMIN — Medication 10 ML: at 02:23

## 2018-07-02 RX ADMIN — SODIUM CHLORIDE 100 ML: 900 INJECTION, SOLUTION INTRAVENOUS at 02:22

## 2018-07-02 RX ADMIN — POTASSIUM CHLORIDE 10 MEQ: 200 INJECTION, SOLUTION INTRAVENOUS at 00:32

## 2018-07-02 RX ADMIN — HYDRALAZINE HYDROCHLORIDE 10 MG: 20 INJECTION INTRAMUSCULAR; INTRAVENOUS at 14:21

## 2018-07-02 RX ADMIN — SODIUM PHOSPHATE, MONOBASIC, MONOHYDRATE: 276; 142 INJECTION, SOLUTION INTRAVENOUS at 06:50

## 2018-07-02 RX ADMIN — IOPAMIDOL 100 ML: 755 INJECTION, SOLUTION INTRAVENOUS at 02:23

## 2018-07-02 RX ADMIN — POTASSIUM CHLORIDE 10 MEQ: 200 INJECTION, SOLUTION INTRAVENOUS at 01:47

## 2018-07-02 RX ADMIN — OXYCODONE HYDROCHLORIDE 5 MG: 5 TABLET ORAL at 05:12

## 2018-07-02 RX ADMIN — ONDANSETRON 4 MG: 2 INJECTION INTRAMUSCULAR; INTRAVENOUS at 16:31

## 2018-07-02 RX ADMIN — FAMOTIDINE 20 MG: 10 INJECTION, SOLUTION INTRAVENOUS at 10:02

## 2018-07-02 RX ADMIN — FENTANYL CITRATE 25 MCG: 50 INJECTION, SOLUTION INTRAMUSCULAR; INTRAVENOUS at 17:49

## 2018-07-02 RX ADMIN — SODIUM CHLORIDE 6 MG/HR: 900 INJECTION, SOLUTION INTRAVENOUS at 01:47

## 2018-07-02 RX ADMIN — SODIUM CHLORIDE 75 ML/HR: 900 INJECTION, SOLUTION INTRAVENOUS at 19:55

## 2018-07-02 RX ADMIN — Medication: at 15:00

## 2018-07-02 RX ADMIN — DEXAMETHASONE SODIUM PHOSPHATE 4 MG: 4 INJECTION, SOLUTION INTRAMUSCULAR; INTRAVENOUS at 05:12

## 2018-07-02 RX ADMIN — ACETAMINOPHEN 650 MG: 325 TABLET ORAL at 14:19

## 2018-07-02 RX ADMIN — SODIUM CHLORIDE 1000 MG: 900 INJECTION, SOLUTION INTRAVENOUS at 21:50

## 2018-07-02 RX ADMIN — OXYCODONE HYDROCHLORIDE 5 MG: 5 TABLET ORAL at 12:22

## 2018-07-02 RX ADMIN — HYDRALAZINE HYDROCHLORIDE 10 MG: 20 INJECTION INTRAMUSCULAR; INTRAVENOUS at 13:29

## 2018-07-02 RX ADMIN — SODIUM CHLORIDE 5 MG/HR: 900 INJECTION, SOLUTION INTRAVENOUS at 19:53

## 2018-07-02 RX ADMIN — HYDRALAZINE HYDROCHLORIDE 10 MG: 20 INJECTION INTRAMUSCULAR; INTRAVENOUS at 16:25

## 2018-07-02 RX ADMIN — SODIUM CHLORIDE 1000 MG: 900 INJECTION, SOLUTION INTRAVENOUS at 10:02

## 2018-07-02 RX ADMIN — FAMOTIDINE 20 MG: 10 INJECTION, SOLUTION INTRAVENOUS at 21:50

## 2018-07-02 NOTE — PROGRESS NOTES
1740 received verbal report from ED RN at [facility] - using sbar- pt bp elevated at that time but neuro status stable  1900 pt arrived in ICU on cardene at 27.3 mg bp systolic over 586 - awaiting orders from physician- dual skin assessment done chg bath done and neuro check done wife in and updated-  1930 shift report given to [address] in to see pt

## 2018-07-02 NOTE — CDMP QUERY
[facility],     Please clarify if this patient is (was) being treated/managed for:     => Cerebral edema and brain compression in the setting of SDH requiring monitoring, IV steroids,   => Other explanation of clinical findings  => Clinically Undetermined (no explanation for clinical findings)    The medical record reflects the following clinical findings, treatment, and risk factors. Risk Factors:  48 y/o pt  Clinical Indicators:  Per Ct head: Armada Mellow Unchanged left  subdural hemorrhage with minimal midline shift to the right. Treatment: IV steroids, monitoring    Please clarify and document your clinical opinion in the progress notes and discharge summary including the definitive and/or presumptive diagnosis, (suspected or probable), related to the above clinical findings. Please include clinical findings supporting your diagnosis.     Thank you,  Billy Torres  [facility]  [phone]

## 2018-07-02 NOTE — PROGRESS NOTES
1622. Pt having episode of nausea and vomiting. Pt c/o of headache pain, states \"this feels different\". Dr. Marysol Waller paged about patient condition. Prn Zofran given. Prn hydralazine given     1629. Spoke with Dr. Marysol Waller. Will take the patient for a stat head CT     1655. Cardene gtt restarted at 5mg. Will monitor closely. 1700. Patient back from CT. Awaiting CT results    2045. Spoke with Dr. Marysol Waller. Updated her about patient current condition. Orders received. Clarified blood pressure parameters per MD. Will maintain SBP less than 160. Cardene gtt infusing @ 5mg/hr. Will monitor closely.

## 2018-07-02 NOTE — PROGRESS NOTES
Brief note full report to follow. Recommend regular diet. Will follow for language evaluation. Thanks. Devon Abrams M.CD.  CCC-SLP

## 2018-07-02 NOTE — PROGRESS NOTES
Hospitalist Progress Note  Carrie Lora MD  Answering service: [phone] from in house phone  Cell: [phone]      Date of Service:  2018  NAME:  Javi Peres  :  1965  MRN:  [id number]      Admission Summary:   79-year-old man without any significant past medical history who was in his usual state of health until about a week ago when the patient developed a headache. The headache is located at the back of the head, intermittent, a dull ache. No known aggravating or relieving factors. Before the onset of the headache, the patient was swimming in the swimming pool and also diving down into 12 foot depth. The headache did not start immediately, but the headache started later on in the evening. The patient has been having the headache intermittently since this swimming episode. On the day of presentation at the emergency room, the headache became associated with slurred speech. Because of that, the patient went to the Emergency Room at [place] [address]. When the patient arrived at the emergency room, a CT scan of the head was obtained. The CT scan shows a subdural hematoma with mass effect. Interval history / Subjective:     Still has a headache but better. Medication is making him sleepy     Assessment & Plan:     1.  Subdural hematoma. On Keppra started by the neurosurgeon. Repeat CT scan of the head and CTA of the head did not show any interval changes. 2.  Hypokalemia. Resolved  3. Elevated blood pressure. Will continue with a Cardene drip as advised by the neurosurgeon.       Code status: Full  DVT prophylaxis: SCD    Care Plan discussed with: Patient/Family and Nurse  Disposition: TBD     Hospital Problems  Date Reviewed: 2018          Codes Class Noted POA    * (Principal)[facility] ICD-10-CM: I62.00  ICD-9-CM: 432.1  2018 Yes                Review of Systems:   A comprehensive review of systems was negative. Vital Signs:    Last 24hrs VS reviewed since prior progress note. Most recent are:  Visit Vitals    BP (!) 148/94    Pulse (!) 54    Temp 99.1 °F (37.3 °C)    Resp 14    Wt 97.7 kg (215 lb 6.2 oz)    SpO2 100%    BMI 26.92 kg/m2         Intake/Output Summary (Last 24 hours) at 07/02/18 1224  Last data filed at 07/02/18 7746   Gross per 24 hour   Intake          2404.84 ml   Output              925 ml   Net          1479.84 ml        Physical Examination:             Constitutional:  No acute distress, cooperative, pleasant    ENT:  Oral mucous moist, oropharynx benign. Neck supple,    Resp:  CTA bilaterally. No wheezing/rhonchi/rales. No accessory muscle use   CV:  Regular rhythm, normal rate, no murmurs, gallops, rubs    GI:  Soft, non distended, non tender. normoactive bowel sounds, no hepatosplenomegaly     Musculoskeletal:  No edema, warm, 2+ pulses throughout    Neurologic:  Moves all extremities. AAOx3, CN II-XII reviewed            Data Review:    Review and/or order of clinical lab test      Labs:     Recent Labs      07/02/18   0630  07/01/18   1653   WBC  11.3*  11.2*   HGB  15.0  16.3   HCT  42.0  45.8   PLT  222  258     Recent Labs      07/02/18   0242  07/01/18   1653   NA  135*  137   K  4.2  4.1  3.2*   CL  104  101   CO2  20*  29   BUN  18  14   CREA  0.95  0.97   GLU  166*  110*   CA  8.7  8.9   MG  2.1   --    PHOS  2.0*   --      Recent Labs      07/02/18   0242  07/01/18   1653   SGOT  18  20   ALT  23  25   AP  130*  141*   TBILI  1.8*  1.5*   TP  7.5  8.3*   ALB  4.0  4.4   GLOB  3.5  3.9     Recent Labs      07/01/18   1653  07/01/18   1651   INR  1.0  1.0   PTP  10.6   --    APTT  30.0   --       No results for input(s): FE, TIBC, PSAT, FERR in the last 72 hours. No results found for: FOL, RBCF   No results for input(s): PH, PCO2, PO2 in the last 72 hours.   Recent Labs      07/02/18   0242  07/01/18   2146  07/01/18   1653   CPK  118  137   -- CKNDX  1.1  1.8   --    TROIQ  <0.05  <0.05  <0.05     Lab Results   Component Value Date/Time    Cholesterol, total 147 07/02/2018 02:42 AM    HDL Cholesterol 42 07/02/2018 02:42 AM    LDL, calculated 89.8 07/02/2018 02:42 AM    Triglyceride 76 07/02/2018 02:42 AM    CHOL/HDL Ratio 3.5 07/02/2018 02:42 AM     Lab Results   Component Value Date/Time    Glucose (POC) 117 (H) 07/01/2018 04:50 PM     No results found for: COLOR, APPRN, SPGRU, REFSG, COLETTE, PROTU, GLUCU, KETU, BILU, UROU, MARGARETTE, LEUKU, GLUKE, EPSU, BACTU, WBCU, RBCU, CASTS, UCRY      Medications Reviewed:     Current Facility-Administered Medications   Medication Dose Route Frequency    sodium phosphate 15 mmol in 0.9% sodium chloride 250 mL infusion   IntraVENous ONCE    levETIRAcetam (KEPPRA) 1,000 mg in 0.9% sodium chloride 100 mL IVPB  1,000 mg IntraVENous Q12H    hydrALAZINE (APRESOLINE) 20 mg/mL injection 10 mg  10 mg IntraVENous Q1H PRN    niCARdipine (CARDENE) 25 mg in 0.9% sodium chloride 250 mL infusion  5-15 mg/hr IntraVENous TITRATE    ondansetron (ZOFRAN) injection 4 mg  4 mg IntraVENous Q6H PRN    naloxone (NARCAN) injection 0.4 mg  0.4 mg IntraVENous PRN    0.9% sodium chloride infusion  75 mL/hr IntraVENous CONTINUOUS    acetaminophen (TYLENOL) tablet 650 mg  650 mg Oral Q6H PRN    oxyCODONE IR (ROXICODONE) tablet 5 mg  5 mg Oral Q4H PRN    bisacodyl (DULCOLAX) tablet 5 mg  5 mg Oral DAILY PRN    famotidine (PF) (PEPCID) 20 mg in sodium chloride 0.9% 10 mL injection  20 mg IntraVENous Q12H     ______________________________________________________________________  EXPECTED LENGTH OF STAY: 2d 4h  ACTUAL LENGTH OF STAY:          1                 Angel Valdes MD

## 2018-07-02 NOTE — CONSULTS
Full note dictated. Repeat head CT in am with CTA. Agree with q one hour neuro checks overnight and cardene gtt to keep SBP < 160. Thank you for this consultation.

## 2018-07-02 NOTE — H&P
[address]   HISTORY AND PHYSICAL      Howard Medina  MR#: [id number]  : 1965  ACCOUNT #: [de-identified]   ADMIT DATE: 2018    Date of admission:  2018. PRIMARY CARE PHYSICIAN:  None. SOURCE OF INFORMATION:  The patient. CHIEF COMPLAINT:  Headache. HISTORY OF PRESENT ILLNESS:  This is a 79-year-old man without any significant past medical history who was in his usual state of health until about a week ago when the patient developed a headache. The headache is located at the back of the head, intermittent, a dull ache. No known aggravating or relieving factors. Before the onset of the headache, the patient was swimming in the swimming pool and also diving down into 12 foot depth. The headache did not start immediately, but the headache started later on in the evening. The patient has been having the headache intermittently since this swimming episode. On the day of presentation at the emergency room, the headache became associated with slurred speech. Because of that, the patient went to the Emergency Room at [facility]. When the patient arrived at the emergency room, a CT scan of the head was obtained. The CT scan shows a subdural hematoma with mass effect. The emergency room physician consulted neurosurgeon at [facility], and transfer to [facility] was advised. The hospitalist service was asked to directly admit the patient from [facility] to the intensive care unit at [address].  The patient denies shortness of breath. No chest pain, no fever, no rigors, no chills. The patient's blood pressure was markedly elevated when he arrived at the emergency room, was started on a Cardene drip as advised by the neurosurgeon. PAST SURGICAL HISTORY:  Not significant. ALLERGIES:  NO KNOWN DRUG ALLERGIES. MEDICATIONS:  Motrin 800 mg as needed. FAMILY HISTORY:  This was reviewed.   His mother had hypertension. PAST SURGICAL HISTORY: Not significant. SOCIAL HISTORY:  No history of alcohol or tobacco abuse. REVIEW OF SYSTEMS:    HEENT:   This is positive for headache and slurred speech. No blurring of vision, no photophobia. RESPIRATORY SYSTEM:  No cough, no shortness of breath, no hemoptysis. CARDIOVASCULAR:  No chest pain, no orthopnea, no palpitation. GASTROINTESTINAL:  No nausea, vomiting, no diarrhea, no constipation. GENITOURINARY:  No dysuria, no urgency, no frequency. All other systems are reviewed and they are negative. PHYSICAL EXAMINATION:  GENERAL:  The patient appeared ill and in moderate distress. VITAL SIGNS:  Temperature of 99.2, pulse 92, blood pressure 176/96, oxygen saturation 96% on room air, respiratory rate 16. HEAD:  Normocephalic, atraumatic. EYES:  Normal eye movement, no redness, no drainage, no discharge. EARS:  Normal external ears with no obvious drainage. NOSE:  No deformity and no drainage. MOUTH AND THROAT:  No visible oral lesion. NECK:  Supple, no JVD, no thyromegaly. CHEST:  Clear breath sounds. No wheezing, no crackles. HEART:  Normal S1 and S2 are regular. No clinically appreciable murmur. ABDOMEN:  Soft, nontender, normal bowel sounds. CENTRAL NERVOUS SYSTEM:  Alert, oriented x3, no gross focal neurological deficit. EXTREMITIES:  No edema. Pulses 2+ bilaterally. MUSCULOSKELETAL:  No obvious joint deformity or swelling. SKIN:  No active skin lesions seen in the exposed part of the body. PSYCHIATRIC:  Normal mood and affect. LYMPHATIC SYSTEM:  No cervical lymphadenopathy. DIAGNOSTIC DATA:  EKG shows sinus rhythm, no significant ST and T-wave abnormalities. Chest x-ray:  No acute pathology. A CT scan of the head without contrast shows acute/subacute left subdural hematoma with mass effect including a 6 mm contralateral subfalcine herniation.     LABORATORY DATA:  Hematology:  WBC 11.2, hemoglobin 15.3, hematocrit 45.8, platelets 700. Coagulation profile:  INR 1.0, PT 10.6, PTT 30.0. Chemistry:  Sodium 137, potassium 3.2, chloride 102, CO2 of 29, glucose 110, BUN 14, creatinine 0.97, calcium 8.9, bilirubin total 1.5, total protein 8.3, albumin level 4.4, globulin 3.9, ALT 25, AST 20, alkaline phosphatase 141. Cardiac profile:  Troponin less than 0.05.    ASSESSMENT:  1.  Subdural hematoma. 2.  Hypokalemia. 3.  Elevated blood pressure. PLAN:  1.  Subdural hematoma. We will admit the patient into the intensive care unit. We will start the patient on Decadron. We will continue with Keppra started by the neurosurgeon. We will repeat CT scan of the head and CTA of the head as advised by the neurosurgeon. 2.  Hypokalemia. We will replace potassium. Repeat potassium level. We also check magnesium level. 3.  Elevated blood pressure. The patient denies a history of hypertension. We will continue with a Cardene drip as advised by the neurosurgeon. We will check a TSH level, check cardiac markers. 4.  Other issues:    CODE STATUS:  THE PATIENT IS A FULL CODE. We will request SCD for DVT prophylaxis.       MD BANG Maurice/ÁNGEL  D: 07/02/2018 04:23     T: 07/02/2018 08:54  JOB #: [id number]

## 2018-07-02 NOTE — PROGRESS NOTES
Problem: Falls - Risk of  Goal: *Absence of Falls  Document Isacc Fall Risk and appropriate interventions in the flowsheet.    Outcome: Progressing Towards Goal  Fall Risk Interventions:            Medication Interventions: Evaluate medications/consider consulting pharmacy, Patient to call before getting OOB

## 2018-07-02 NOTE — PROGRESS NOTES
Occupational Therapy Note  7/2/2018    Orders acknowledged, chart reviewed. Attempted to see pt at bedside, however, pt and RN reporting significant headache at this time 6/10 and just received medication. Pt and RN requesting to hold therapy at this time. Will follow-up later as able and appropriate.     Melanie Pinto, OTR/L

## 2018-07-02 NOTE — PROGRESS NOTES
Speech Pathology bedside swallow evaluation/discharge  Patient: Mandeep Greco (89 y.o. male)  Date: 7/2/2018  Primary Diagnosis: Hemorragic stroke  Subdural hematoma (HCC)        Precautions: fall       ASSESSMENT :  Based on the objective data described below, the patient presents with no oral or pharyngeal dysphagia. Timely and complete mastication, timely swallow initiation and functional hyolaryngeal elevation/excursion via palpation. No s/s of aspiration observed. Patient markedly aphasic, wife reports improved today compared to yesterday. Also drowsy after not sleeping well last night. Will follow for formal language evaluation. Skilled therapy provided by a speech-language pathologist is not indicated at this time. PLAN :  Recommendations:  Recommend regular diet, general aspiration precautions. Will follow for formal language evaluation   Discharge Recommendations: To Be Determined     SUBJECTIVE:   Patient sleeping but woke easily. Markedly aphasic, though wife reports improving today. OBJECTIVE:   History reviewed. No pertinent past medical history. History reviewed. No pertinent surgical history.   Prior Level of Function/Home Situation:   Home Situation  Home Environment: Private residence  # Steps to Enter: 3  One/Two Story Residence: Two story, live on 1st floor  Living Alone: No  Support Systems: Family member(s)  Patient Expects to be Discharged to[de-identified] Private residence  Current DME Used/Available at Home: None  Diet prior to admission: regular  Current Diet:   NPO    Cognitive and Communication Status:  Neurologic State: Alert  Orientation Level: Unable to verbalize, Oriented to person (aphasic)  Cognition: Decreased attention/concentration, Follows commands  Perception: Appears intact  Perseveration: Perseverates during conversation  Safety/Judgement: Not assessed  Oral Assessment:  Oral Assessment  Labial: Decreased rate;Decreased seal  Dentition: Natural  Oral Hygiene: moist mucosa Lingual: Decreased rate  Velum: No impairment  Mandible: No impairment  P.O. Trials:  Patient Position: upright in bed   Vocal quality prior to P.O.: No impairment  Consistency Presented: Thin liquid; Solid;Puree; Ice chips  How Presented: SLP-fed/presented;Self-fed/presented;Cup/sip;Cup/gulp;Straw;Successive swallows;Spoon     Bolus Acceptance: No impairment  Bolus Formation/Control: No impairment     Propulsion: No impairment  Oral Residue: None  Initiation of Swallow: No impairment  Laryngeal Elevation: Functional  Aspiration Signs/Symptoms: None  Pharyngeal Phase Characteristics: No impairment, issues, or problems              Oral Phase Severity: No impairment  Pharyngeal Phase Severity : No impairment      G Codes: In compliance with CMSs Claims Based Outcome Reporting, the following G-code set was chosen for this patient based the use of the NOMS functional outcome to quantify this patient's level of swallowing impairment. Using the NOMS, the patient was determined to be at level 7 for swallow function which correlates with the CH= 0% level of severity. Based on the objective assessment provided within this note, the current, goal, and discharge g-codes are as follows:    Swallow  Swallowing:   Swallow Current Status CH= 0%   Swallow Goal Status CH= 0%   Swallow D/C Status CH= 0%      NOMS Swallowing Levels:  Level 1 (CN): NPO  Level 2 (CM): NPO but takes consistency in therapy  Level 3 (CL): Takes less than 50% of nutrition p.o. and continues with nonoral feedings; and/or safe with mod cues; and/or max diet restriction  Level 4 (CK): Safe swallow but needs mod cues; and/or mod diet restriction; and/or still requires some nonoral feeding/supplements  Level 5 (CJ): Safe swallow with min diet restriction; and/or needs min cues  Level 6 (CI): Independent with p.o.; rare cues; usually self cues; may need to avoid some foods or needs extra time  Level 7 [facility]): Independent for all p.o.  JERRY. (2003). National Outcomes Measurement System (NOMS): Adult Speech-Language Pathology User's Guide. Pain:  Pain Scale 1: Numeric (0 - 10)  Pain Intensity 1: 6  Pain Location 1: Head  After treatment:   [] Patient left in no apparent distress sitting up in chair  [x] Patient left in no apparent distress in bed  [x] Call bell left within reach  [x] Nursing notified  [x] Caregiver present  [] Bed alarm activated    COMMUNICATION/EDUCATION:   The patients plan of care including findings, recommendations, and recommended diet changes were discussed with: Registered Nurse. [x] Patient/family have participated as able and agree with findings and recommendations. [] Patient is unable to participate in plan of care at this time. Thank you for this referral.  Kostas Brenner M.CD.  CCC-SLP   Time Calculation: 10 mins

## 2018-07-02 NOTE — ROUTINE PROCESS
Bedside, Verbal and Written shift change report given to Obey Hanna RN (oncoming nurse) by Destiny Flores RN (offgoing nurse). Report included the following information SBAR, Kardex, ED Summary, OR Summary, Procedure Summary, Intake/Output, MAR and Accordion.

## 2018-07-02 NOTE — PROGRESS NOTES
PCCM    48 yo with headache and CT with L SDH and small left shift. NS folloiwng.  No acute process on CTA    On decadron and keppra    Not currently on cardene gtt    Will be available to see formally if needed    Leland Le MD

## 2018-07-02 NOTE — PROGRESS NOTES
No complaints  Headache 2/10  Continued slurred speech with some expressive aphasia  No pronator drift  Good strength  Head CT shows no change with some improvement in midline shift  Will stop decadron  Increase keppra to try to minimize cortical irritation from SDH  Change neuro checks to q 4 hours  OK to transfer to [facility] when he is able to be off cardene gtt, may need po anti-hypertensives to keep SBP<160  Allow to eat  Speech scottieal  Discussed with patient and his wife that we will try to avoid surgical treatment of SDH, indications for surgery would be increase in mass effect radiographically, new neuro deficit, persistent aphasia

## 2018-07-03 ENCOUNTER — ANESTHESIA (OUTPATIENT)
Dept: SURGERY | Age: 53
DRG: 025 | End: 2018-07-03
Payer: COMMERCIAL

## 2018-07-03 ENCOUNTER — ANESTHESIA EVENT (OUTPATIENT)
Dept: SURGERY | Age: 53
DRG: 025 | End: 2018-07-03
Payer: COMMERCIAL

## 2018-07-03 ENCOUNTER — APPOINTMENT (OUTPATIENT)
Dept: CT IMAGING | Age: 53
DRG: 025 | End: 2018-07-03
Attending: NEUROLOGICAL SURGERY
Payer: COMMERCIAL

## 2018-07-03 LAB
ATRIAL RATE: 82 BPM
CALCULATED P AXIS, ECG09: 18 DEGREES
CALCULATED R AXIS, ECG10: -41 DEGREES
CALCULATED T AXIS, ECG11: 37 DEGREES
DIAGNOSIS, 93000: NORMAL
P-R INTERVAL, ECG05: 162 MS
Q-T INTERVAL, ECG07: 400 MS
QRS DURATION, ECG06: 114 MS
QTC CALCULATION (BEZET), ECG08: 467 MS
VENTRICULAR RATE, ECG03: 82 BPM

## 2018-07-03 PROCEDURE — 74011250636 HC RX REV CODE- 250/636: Performed by: ANESTHESIOLOGY

## 2018-07-03 PROCEDURE — 74011250636 HC RX REV CODE- 250/636: Performed by: NEUROLOGICAL SURGERY

## 2018-07-03 PROCEDURE — 77030012406 HC DRN WND PENRS BARD -A: Performed by: NEUROLOGICAL SURGERY

## 2018-07-03 PROCEDURE — 77030004472 HC BUR TAPR MEDT -B: Performed by: NEUROLOGICAL SURGERY

## 2018-07-03 PROCEDURE — 74011000250 HC RX REV CODE- 250: Performed by: NEUROLOGICAL SURGERY

## 2018-07-03 PROCEDURE — 76210000006 HC OR PH I REC 0.5 TO 1 HR: Performed by: NEUROLOGICAL SURGERY

## 2018-07-03 PROCEDURE — 74011000250 HC RX REV CODE- 250

## 2018-07-03 PROCEDURE — 77030013079 HC BLNKT BAIR HGGR 3M -A: Performed by: ANESTHESIOLOGY

## 2018-07-03 PROCEDURE — 76010000172 HC OR TIME 2.5 TO 3 HR INTENSV-TIER 1: Performed by: NEUROLOGICAL SURGERY

## 2018-07-03 PROCEDURE — 77030014647 HC SEAL FBRN TISSL BAXT -D: Performed by: NEUROLOGICAL SURGERY

## 2018-07-03 PROCEDURE — 00C40ZZ EXTIRPATION OF MATTER FROM INTRACRANIAL SUBDURAL SPACE, OPEN APPROACH: ICD-10-PCS | Performed by: NEUROLOGICAL SURGERY

## 2018-07-03 PROCEDURE — 77030003029 HC SUT VCRL J&J -B: Performed by: NEUROLOGICAL SURGERY

## 2018-07-03 PROCEDURE — 74011250637 HC RX REV CODE- 250/637: Performed by: HOSPITALIST

## 2018-07-03 PROCEDURE — 77030018836 HC SOL IRR NACL ICUM -A: Performed by: NEUROLOGICAL SURGERY

## 2018-07-03 PROCEDURE — 74011000258 HC RX REV CODE- 258: Performed by: NEUROLOGICAL SURGERY

## 2018-07-03 PROCEDURE — C1713 ANCHOR/SCREW BN/BN,TIS/BN: HCPCS | Performed by: NEUROLOGICAL SURGERY

## 2018-07-03 PROCEDURE — 77030012602 HC SPNG PTTY NEUR J&J -B: Performed by: NEUROLOGICAL SURGERY

## 2018-07-03 PROCEDURE — 77030026438 HC STYL ET INTUB CARD -A: Performed by: ANESTHESIOLOGY

## 2018-07-03 PROCEDURE — 92523 SPEECH SOUND LANG COMPREHEN: CPT | Performed by: SPEECH-LANGUAGE PATHOLOGIST

## 2018-07-03 PROCEDURE — 77030008684 HC TU ET CUF COVD -B: Performed by: ANESTHESIOLOGY

## 2018-07-03 PROCEDURE — 77030005401 HC CATH RAD ARRO -A: Performed by: ANESTHESIOLOGY

## 2018-07-03 PROCEDURE — 74011000250 HC RX REV CODE- 250: Performed by: INTERNAL MEDICINE

## 2018-07-03 PROCEDURE — 77030014007 HC SPNG HEMSTAT J&J -B: Performed by: NEUROLOGICAL SURGERY

## 2018-07-03 PROCEDURE — 77030005402 HC CATH RAD ART LN KT TELE -B

## 2018-07-03 PROCEDURE — 65610000006 HC RM INTENSIVE CARE

## 2018-07-03 PROCEDURE — 74011250636 HC RX REV CODE- 250/636: Performed by: HOSPITALIST

## 2018-07-03 PROCEDURE — 74011250636 HC RX REV CODE- 250/636: Performed by: INTERNAL MEDICINE

## 2018-07-03 PROCEDURE — 76060000036 HC ANESTHESIA 2.5 TO 3 HR: Performed by: NEUROLOGICAL SURGERY

## 2018-07-03 PROCEDURE — 70450 CT HEAD/BRAIN W/O DYE: CPT

## 2018-07-03 PROCEDURE — 77030009081 HC CLP NEUR GUN SET MEDT -B: Performed by: NEUROLOGICAL SURGERY

## 2018-07-03 PROCEDURE — 74011250636 HC RX REV CODE- 250/636

## 2018-07-03 PROCEDURE — 77030004391 HC BUR FLUT MEDT -C: Performed by: NEUROLOGICAL SURGERY

## 2018-07-03 PROCEDURE — 77030032490 HC SLV COMPR SCD KNE COVD -B: Performed by: NEUROLOGICAL SURGERY

## 2018-07-03 PROCEDURE — 74011000272 HC RX REV CODE- 272: Performed by: NEUROLOGICAL SURGERY

## 2018-07-03 PROCEDURE — 74011250637 HC RX REV CODE- 250/637: Performed by: INTERNAL MEDICINE

## 2018-07-03 PROCEDURE — 77030003892 HC BIT DRL TWST MEDT -B: Performed by: NEUROLOGICAL SURGERY

## 2018-07-03 PROCEDURE — 77030013797 HC KT TRNSDUC PRSSR EDWD -A

## 2018-07-03 PROCEDURE — 77030014355 HC CVR BUR H TI BIOM -C: Performed by: NEUROLOGICAL SURGERY

## 2018-07-03 PROCEDURE — 77030019908 HC STETH ESOPH SIMS -A: Performed by: ANESTHESIOLOGY

## 2018-07-03 PROCEDURE — 77030002946 HC SUT NRLN J&J -B: Performed by: NEUROLOGICAL SURGERY

## 2018-07-03 DEVICE — SCREW BONE L4MM DIA1.5MM CRANIOMAXILLOFACIAL GLD TI ST: Type: IMPLANTABLE DEVICE | Site: CRANIAL | Status: FUNCTIONAL

## 2018-07-03 DEVICE — SCREW BNE L3.5MM DIA1.5MM CORT MAXILLOMANDIBULAR GRN TI: Type: IMPLANTABLE DEVICE | Site: CRANIAL | Status: FUNCTIONAL

## 2018-07-03 DEVICE — COVER BUR H L DIA18.5MM THK0.5MM 5 H NEURO TI W/O TAB: Type: IMPLANTABLE DEVICE | Site: CRANIAL | Status: FUNCTIONAL

## 2018-07-03 DEVICE — SCREW BNE L4MM DIA1.5MM CORT MAXILLOMANDIBULAR GRN TI SELF: Type: IMPLANTABLE DEVICE | Site: CRANIAL | Status: FUNCTIONAL

## 2018-07-03 RX ORDER — MIDAZOLAM HYDROCHLORIDE 1 MG/ML
1 INJECTION, SOLUTION INTRAMUSCULAR; INTRAVENOUS AS NEEDED
Status: DISCONTINUED | OUTPATIENT
Start: 2018-07-03 | End: 2018-07-03 | Stop reason: HOSPADM

## 2018-07-03 RX ORDER — LIDOCAINE HYDROCHLORIDE 10 MG/ML
0.1 INJECTION, SOLUTION EPIDURAL; INFILTRATION; INTRACAUDAL; PERINEURAL AS NEEDED
Status: DISCONTINUED | OUTPATIENT
Start: 2018-07-03 | End: 2018-07-03 | Stop reason: HOSPADM

## 2018-07-03 RX ORDER — HYDROMORPHONE HYDROCHLORIDE 2 MG/ML
INJECTION, SOLUTION INTRAMUSCULAR; INTRAVENOUS; SUBCUTANEOUS AS NEEDED
Status: DISCONTINUED | OUTPATIENT
Start: 2018-07-03 | End: 2018-07-03 | Stop reason: HOSPADM

## 2018-07-03 RX ORDER — SODIUM CHLORIDE, SODIUM LACTATE, POTASSIUM CHLORIDE, CALCIUM CHLORIDE 600; 310; 30; 20 MG/100ML; MG/100ML; MG/100ML; MG/100ML
INJECTION, SOLUTION INTRAVENOUS
Status: DISCONTINUED | OUTPATIENT
Start: 2018-07-03 | End: 2018-07-03 | Stop reason: HOSPADM

## 2018-07-03 RX ORDER — PROPOFOL 10 MG/ML
INJECTION, EMULSION INTRAVENOUS
Status: DISCONTINUED | OUTPATIENT
Start: 2018-07-03 | End: 2018-07-03 | Stop reason: HOSPADM

## 2018-07-03 RX ORDER — MIDAZOLAM HYDROCHLORIDE 1 MG/ML
0.5 INJECTION, SOLUTION INTRAMUSCULAR; INTRAVENOUS
Status: DISCONTINUED | OUTPATIENT
Start: 2018-07-03 | End: 2018-07-04 | Stop reason: HOSPADM

## 2018-07-03 RX ORDER — HYDRALAZINE HYDROCHLORIDE 20 MG/ML
INJECTION INTRAMUSCULAR; INTRAVENOUS AS NEEDED
Status: DISCONTINUED | OUTPATIENT
Start: 2018-07-03 | End: 2018-07-03 | Stop reason: HOSPADM

## 2018-07-03 RX ORDER — AMLODIPINE BESYLATE 5 MG/1
10 TABLET ORAL DAILY
Status: DISCONTINUED | OUTPATIENT
Start: 2018-07-03 | End: 2018-07-11 | Stop reason: HOSPADM

## 2018-07-03 RX ORDER — SODIUM CHLORIDE 0.9 % (FLUSH) 0.9 %
SYRINGE (ML) INJECTION
Status: COMPLETED
Start: 2018-07-03 | End: 2018-07-04

## 2018-07-03 RX ORDER — FENTANYL CITRATE 50 UG/ML
50 INJECTION, SOLUTION INTRAMUSCULAR; INTRAVENOUS AS NEEDED
Status: DISCONTINUED | OUTPATIENT
Start: 2018-07-03 | End: 2018-07-03 | Stop reason: HOSPADM

## 2018-07-03 RX ORDER — LIDOCAINE HYDROCHLORIDE AND EPINEPHRINE 10; 10 MG/ML; UG/ML
INJECTION, SOLUTION INFILTRATION; PERINEURAL AS NEEDED
Status: DISCONTINUED | OUTPATIENT
Start: 2018-07-03 | End: 2018-07-03 | Stop reason: HOSPADM

## 2018-07-03 RX ORDER — LEVETIRACETAM 500 MG/1
1000 TABLET ORAL EVERY 12 HOURS
Status: DISCONTINUED | OUTPATIENT
Start: 2018-07-03 | End: 2018-07-03

## 2018-07-03 RX ORDER — SODIUM CHLORIDE AND POTASSIUM CHLORIDE .9; .15 G/100ML; G/100ML
SOLUTION INTRAVENOUS CONTINUOUS
Status: DISCONTINUED | OUTPATIENT
Start: 2018-07-03 | End: 2018-07-05

## 2018-07-03 RX ORDER — CEFAZOLIN SODIUM/WATER 2 G/20 ML
2 SYRINGE (ML) INTRAVENOUS
Status: COMPLETED | OUTPATIENT
Start: 2018-07-03 | End: 2018-07-03

## 2018-07-03 RX ORDER — MANNITOL 20 G/100ML
INJECTION, SOLUTION INTRAVENOUS
Status: DISCONTINUED | OUTPATIENT
Start: 2018-07-03 | End: 2018-07-03 | Stop reason: HOSPADM

## 2018-07-03 RX ORDER — BACITRACIN 500 [USP'U]/G
OINTMENT TOPICAL AS NEEDED
Status: DISCONTINUED | OUTPATIENT
Start: 2018-07-03 | End: 2018-07-03 | Stop reason: HOSPADM

## 2018-07-03 RX ORDER — DEXAMETHASONE SODIUM PHOSPHATE 4 MG/ML
INJECTION, SOLUTION INTRA-ARTICULAR; INTRALESIONAL; INTRAMUSCULAR; INTRAVENOUS; SOFT TISSUE AS NEEDED
Status: DISCONTINUED | OUTPATIENT
Start: 2018-07-03 | End: 2018-07-03 | Stop reason: HOSPADM

## 2018-07-03 RX ORDER — SODIUM CHLORIDE 0.9 % (FLUSH) 0.9 %
5-10 SYRINGE (ML) INJECTION AS NEEDED
Status: DISCONTINUED | OUTPATIENT
Start: 2018-07-03 | End: 2018-07-03 | Stop reason: HOSPADM

## 2018-07-03 RX ORDER — SODIUM CHLORIDE, SODIUM LACTATE, POTASSIUM CHLORIDE, CALCIUM CHLORIDE 600; 310; 30; 20 MG/100ML; MG/100ML; MG/100ML; MG/100ML
100 INJECTION, SOLUTION INTRAVENOUS CONTINUOUS
Status: DISCONTINUED | OUTPATIENT
Start: 2018-07-03 | End: 2018-07-03 | Stop reason: HOSPADM

## 2018-07-03 RX ORDER — SODIUM CHLORIDE 0.9 % (FLUSH) 0.9 %
5-10 SYRINGE (ML) INJECTION AS NEEDED
Status: DISCONTINUED | OUTPATIENT
Start: 2018-07-03 | End: 2018-07-04 | Stop reason: HOSPADM

## 2018-07-03 RX ORDER — SODIUM CHLORIDE AND POTASSIUM CHLORIDE .9; .15 G/100ML; G/100ML
SOLUTION INTRAVENOUS CONTINUOUS
Status: DISCONTINUED | OUTPATIENT
Start: 2018-07-03 | End: 2018-07-03 | Stop reason: SDUPTHER

## 2018-07-03 RX ORDER — FAMOTIDINE 20 MG/1
20 TABLET, FILM COATED ORAL EVERY 12 HOURS
Status: DISCONTINUED | OUTPATIENT
Start: 2018-07-03 | End: 2018-07-04

## 2018-07-03 RX ORDER — FENTANYL CITRATE 50 UG/ML
25 INJECTION, SOLUTION INTRAMUSCULAR; INTRAVENOUS
Status: DISCONTINUED | OUTPATIENT
Start: 2018-07-03 | End: 2018-07-04 | Stop reason: HOSPADM

## 2018-07-03 RX ORDER — LABETALOL HYDROCHLORIDE 5 MG/ML
INJECTION, SOLUTION INTRAVENOUS AS NEEDED
Status: DISCONTINUED | OUTPATIENT
Start: 2018-07-03 | End: 2018-07-03 | Stop reason: HOSPADM

## 2018-07-03 RX ORDER — SUCCINYLCHOLINE CHLORIDE 20 MG/ML
INJECTION INTRAMUSCULAR; INTRAVENOUS AS NEEDED
Status: DISCONTINUED | OUTPATIENT
Start: 2018-07-03 | End: 2018-07-03 | Stop reason: HOSPADM

## 2018-07-03 RX ORDER — LIDOCAINE HYDROCHLORIDE 20 MG/ML
INJECTION, SOLUTION EPIDURAL; INFILTRATION; INTRACAUDAL; PERINEURAL AS NEEDED
Status: DISCONTINUED | OUTPATIENT
Start: 2018-07-03 | End: 2018-07-03 | Stop reason: HOSPADM

## 2018-07-03 RX ORDER — ONDANSETRON 2 MG/ML
4 INJECTION INTRAMUSCULAR; INTRAVENOUS
Status: DISCONTINUED | OUTPATIENT
Start: 2018-07-03 | End: 2018-07-04

## 2018-07-03 RX ORDER — DEXAMETHASONE SODIUM PHOSPHATE 4 MG/ML
10 INJECTION, SOLUTION INTRA-ARTICULAR; INTRALESIONAL; INTRAMUSCULAR; INTRAVENOUS; SOFT TISSUE ONCE
Status: COMPLETED | OUTPATIENT
Start: 2018-07-03 | End: 2018-07-03

## 2018-07-03 RX ORDER — ONDANSETRON 2 MG/ML
INJECTION INTRAMUSCULAR; INTRAVENOUS AS NEEDED
Status: DISCONTINUED | OUTPATIENT
Start: 2018-07-03 | End: 2018-07-03 | Stop reason: HOSPADM

## 2018-07-03 RX ORDER — SODIUM CHLORIDE, SODIUM LACTATE, POTASSIUM CHLORIDE, CALCIUM CHLORIDE 600; 310; 30; 20 MG/100ML; MG/100ML; MG/100ML; MG/100ML
100 INJECTION, SOLUTION INTRAVENOUS CONTINUOUS
Status: DISCONTINUED | OUTPATIENT
Start: 2018-07-03 | End: 2018-07-04 | Stop reason: HOSPADM

## 2018-07-03 RX ORDER — FENTANYL CITRATE 50 UG/ML
INJECTION, SOLUTION INTRAMUSCULAR; INTRAVENOUS AS NEEDED
Status: DISCONTINUED | OUTPATIENT
Start: 2018-07-03 | End: 2018-07-03 | Stop reason: HOSPADM

## 2018-07-03 RX ORDER — PROPOFOL 10 MG/ML
INJECTION, EMULSION INTRAVENOUS AS NEEDED
Status: DISCONTINUED | OUTPATIENT
Start: 2018-07-03 | End: 2018-07-03 | Stop reason: HOSPADM

## 2018-07-03 RX ORDER — DIPHENHYDRAMINE HYDROCHLORIDE 50 MG/ML
12.5 INJECTION, SOLUTION INTRAMUSCULAR; INTRAVENOUS AS NEEDED
Status: ACTIVE | OUTPATIENT
Start: 2018-07-03 | End: 2018-07-03

## 2018-07-03 RX ORDER — ROCURONIUM BROMIDE 10 MG/ML
INJECTION, SOLUTION INTRAVENOUS AS NEEDED
Status: DISCONTINUED | OUTPATIENT
Start: 2018-07-03 | End: 2018-07-03 | Stop reason: HOSPADM

## 2018-07-03 RX ORDER — ROPIVACAINE HYDROCHLORIDE 5 MG/ML
150 INJECTION, SOLUTION EPIDURAL; INFILTRATION; PERINEURAL AS NEEDED
Status: DISCONTINUED | OUTPATIENT
Start: 2018-07-03 | End: 2018-07-03 | Stop reason: HOSPADM

## 2018-07-03 RX ORDER — SODIUM CHLORIDE 0.9 % (FLUSH) 0.9 %
5-10 SYRINGE (ML) INJECTION EVERY 8 HOURS
Status: DISCONTINUED | OUTPATIENT
Start: 2018-07-03 | End: 2018-07-03 | Stop reason: HOSPADM

## 2018-07-03 RX ADMIN — HYDROMORPHONE HYDROCHLORIDE 0.5 MG: 2 INJECTION, SOLUTION INTRAMUSCULAR; INTRAVENOUS; SUBCUTANEOUS at 21:24

## 2018-07-03 RX ADMIN — SODIUM CHLORIDE 500 MG: 900 INJECTION, SOLUTION INTRAVENOUS at 20:46

## 2018-07-03 RX ADMIN — FENTANYL CITRATE 150 MCG: 50 INJECTION, SOLUTION INTRAMUSCULAR; INTRAVENOUS at 20:32

## 2018-07-03 RX ADMIN — LIDOCAINE HYDROCHLORIDE 100 MG: 20 INJECTION, SOLUTION EPIDURAL; INFILTRATION; INTRACAUDAL; PERINEURAL at 20:32

## 2018-07-03 RX ADMIN — OXYCODONE HYDROCHLORIDE 5 MG: 5 TABLET ORAL at 16:46

## 2018-07-03 RX ADMIN — SODIUM CHLORIDE 2.5 MG/HR: 900 INJECTION, SOLUTION INTRAVENOUS at 21:29

## 2018-07-03 RX ADMIN — HYDRALAZINE HYDROCHLORIDE 10 MG: 20 INJECTION INTRAMUSCULAR; INTRAVENOUS at 22:30

## 2018-07-03 RX ADMIN — HYDRALAZINE HYDROCHLORIDE 10 MG: 20 INJECTION INTRAMUSCULAR; INTRAVENOUS at 09:50

## 2018-07-03 RX ADMIN — FAMOTIDINE 20 MG: 10 INJECTION, SOLUTION INTRAVENOUS at 09:23

## 2018-07-03 RX ADMIN — SODIUM CHLORIDE, SODIUM LACTATE, POTASSIUM CHLORIDE, CALCIUM CHLORIDE: 600; 310; 30; 20 INJECTION, SOLUTION INTRAVENOUS at 20:21

## 2018-07-03 RX ADMIN — ACETAMINOPHEN 650 MG: 325 TABLET ORAL at 01:09

## 2018-07-03 RX ADMIN — FENTANYL CITRATE 25 MCG: 50 INJECTION, SOLUTION INTRAMUSCULAR; INTRAVENOUS at 23:30

## 2018-07-03 RX ADMIN — ACETAMINOPHEN 650 MG: 325 TABLET ORAL at 06:42

## 2018-07-03 RX ADMIN — PROPOFOL 200 MG: 10 INJECTION, EMULSION INTRAVENOUS at 20:32

## 2018-07-03 RX ADMIN — HYDRALAZINE HYDROCHLORIDE 10 MG: 20 INJECTION INTRAMUSCULAR; INTRAVENOUS at 11:50

## 2018-07-03 RX ADMIN — ROCURONIUM BROMIDE 50 MG: 10 INJECTION, SOLUTION INTRAVENOUS at 21:28

## 2018-07-03 RX ADMIN — HYDRALAZINE HYDROCHLORIDE 10 MG: 20 INJECTION INTRAMUSCULAR; INTRAVENOUS at 22:44

## 2018-07-03 RX ADMIN — AMLODIPINE BESYLATE 10 MG: 5 TABLET ORAL at 16:46

## 2018-07-03 RX ADMIN — POTASSIUM CHLORIDE AND SODIUM CHLORIDE: 900; 150 INJECTION, SOLUTION INTRAVENOUS at 23:21

## 2018-07-03 RX ADMIN — LABETALOL HYDROCHLORIDE 10 MG: 5 INJECTION, SOLUTION INTRAVENOUS at 23:07

## 2018-07-03 RX ADMIN — ONDANSETRON 4 MG: 2 INJECTION INTRAMUSCULAR; INTRAVENOUS at 22:28

## 2018-07-03 RX ADMIN — ACETAMINOPHEN 650 MG: 325 TABLET ORAL at 11:50

## 2018-07-03 RX ADMIN — DEXAMETHASONE SODIUM PHOSPHATE 10 MG: 4 INJECTION, SOLUTION INTRAMUSCULAR; INTRAVENOUS at 18:07

## 2018-07-03 RX ADMIN — HYDRALAZINE HYDROCHLORIDE 10 MG: 20 INJECTION INTRAMUSCULAR; INTRAVENOUS at 18:37

## 2018-07-03 RX ADMIN — HYDRALAZINE HYDROCHLORIDE 10 MG: 20 INJECTION INTRAMUSCULAR; INTRAVENOUS at 15:10

## 2018-07-03 RX ADMIN — SODIUM CHLORIDE 5 MG/HR: 900 INJECTION, SOLUTION INTRAVENOUS at 01:03

## 2018-07-03 RX ADMIN — SODIUM CHLORIDE, SODIUM LACTATE, POTASSIUM CHLORIDE, CALCIUM CHLORIDE: 600; 310; 30; 20 INJECTION, SOLUTION INTRAVENOUS at 21:27

## 2018-07-03 RX ADMIN — SODIUM CHLORIDE 5 MG/HR: 900 INJECTION, SOLUTION INTRAVENOUS at 06:02

## 2018-07-03 RX ADMIN — DEXAMETHASONE SODIUM PHOSPHATE 10 MG: 4 INJECTION, SOLUTION INTRA-ARTICULAR; INTRALESIONAL; INTRAMUSCULAR; INTRAVENOUS; SOFT TISSUE at 20:50

## 2018-07-03 RX ADMIN — Medication 2 G: at 20:52

## 2018-07-03 RX ADMIN — FENTANYL CITRATE 25 MCG: 50 INJECTION, SOLUTION INTRAMUSCULAR; INTRAVENOUS at 23:40

## 2018-07-03 RX ADMIN — FENTANYL CITRATE 100 MCG: 50 INJECTION, SOLUTION INTRAMUSCULAR; INTRAVENOUS at 20:59

## 2018-07-03 RX ADMIN — SODIUM CHLORIDE, SODIUM LACTATE, POTASSIUM CHLORIDE, AND CALCIUM CHLORIDE 100 ML/HR: 600; 310; 30; 20 INJECTION, SOLUTION INTRAVENOUS at 20:14

## 2018-07-03 RX ADMIN — SODIUM CHLORIDE 1000 MG: 900 INJECTION, SOLUTION INTRAVENOUS at 09:23

## 2018-07-03 RX ADMIN — PROPOFOL 100 MCG/KG/MIN: 10 INJECTION, EMULSION INTRAVENOUS at 20:36

## 2018-07-03 RX ADMIN — SUCCINYLCHOLINE CHLORIDE 160 MG: 20 INJECTION INTRAMUSCULAR; INTRAVENOUS at 20:32

## 2018-07-03 RX ADMIN — ONDANSETRON 4 MG: 2 INJECTION INTRAMUSCULAR; INTRAVENOUS at 18:11

## 2018-07-03 RX ADMIN — MANNITOL: 20 INJECTION, SOLUTION INTRAVENOUS at 20:44

## 2018-07-03 RX ADMIN — ONDANSETRON 4 MG: 2 INJECTION INTRAMUSCULAR; INTRAVENOUS at 12:11

## 2018-07-03 RX ADMIN — ROCURONIUM BROMIDE 10 MG: 10 INJECTION, SOLUTION INTRAVENOUS at 20:32

## 2018-07-03 RX ADMIN — ROCURONIUM BROMIDE 40 MG: 10 INJECTION, SOLUTION INTRAVENOUS at 20:37

## 2018-07-03 RX ADMIN — ROCURONIUM BROMIDE 20 MG: 10 INJECTION, SOLUTION INTRAVENOUS at 22:07

## 2018-07-03 NOTE — PROGRESS NOTES
Reason for Admission:   SDH                   RRAT Score:      0               Plan for utilizing home health:   TBD                       Likelihood of Readmission:  low                         Transition of Care Plan:  TBD    Care manager met with patient ot expalin role and discuss transitions of care. Patient lives independently with his wife. Patient's wife stated patient 's last PCP was  [facility], but had not seen him in years. They use the [facility] as their pharmacy. CM confirmed patient's address and  Insurance information. Care manager will follow for potential discharge planning needs. Aaliyah Bellamy RN,CRM     Care Management Interventions  PCP Verified by CM:  Yes  MyChart Signup: No  Discharge Durable Medical Equipment: No  Physical Therapy Consult: Yes  Occupational Therapy Consult: Yes  Speech Therapy Consult: Yes  Current Support Network: Lives with Spouse Kuldeep Morales (wife) [phone])

## 2018-07-03 NOTE — PROGRESS NOTES
Called to see patient on call. Worsening lethargy expressive aphasia, right weakness throughout the day per nursing. Speaks words. Cannot say name or sentences. Right drift and face weakness. Recommend emergent craniotomy for evacuation of sdh. Risks. Goals and rationale for decompression explained.

## 2018-07-03 NOTE — PROGRESS NOTES
Problem: Communication Impaired (Adult)  Goal: *Speech Goal: (INSERT TEXT)  Speech Therapy Goals  Initiated 7/3/2018  Patient will, within 7 days:  1. Follow 2 step commands 50%  2. Answer yes/no simple questions 70%  3. Complete automatic sentences 75%  Speech LAnguage Pathology evaluation  Patient: Berry Brittle (32 y.o. male)  Date: 7/3/2018  Primary Diagnosis: Hemorragic stroke  Subdural hematoma (HCC)        Precautions:        ASSESSMENT :  Based on the objective data described below, the patient presents with new onset significant aphasia, moderate receptive and expressive. Wife reports some improvement since yesterday. Will be a good rehab candidate    Patient will benefit from skilled intervention to address the above impairments. Patients rehabilitation potential is considered to be Excellent  Factors which may influence rehabilitation potential include:   [x]              None noted  []              Mental ability/status  []              Medical condition  []              Home/family situation and support systems  []              Safety awareness  []              Pain tolerance/management  []              Other:      PLAN :  Recommendations and Planned Interventions:  1. SLP tx as above  2. No SLP tx needed for swallow, speech only  3. Wife educated on ways to faciliate speech, asking simple questions and giving choices       Frequency/Duration: Patient will be followed by speech-language pathology 3 times a week to address goals. Discharge Recommendations: Rehab     SUBJECTIVE:   Patient stated Dion Labor. OBJECTIVE:   History reviewed. No pertinent past medical history. History reviewed. No pertinent surgical history.   Prior Level of Function/Home Situation:   Home Situation  Home Environment: Private residence  # Steps to Enter: 3  One/Two Story Residence: Two story, live on 1st floor  Living Alone: No  Support Systems: Family member(s)  Patient Expects to be Discharged to[de-identified] Private residence  Current DME Used/Available at Home: None  Mental Status:  Neurologic State: Alert, Drowsy  Orientation Level: Oriented to person, Oriented to place, Other (Comment) (expressive aphasia)  Cognition: Follows commands  Perception: Appears intact  Perseveration: Perseverates during conversation  Safety/Judgement: Not assessed  Motor Speech:  Oral-Motor Structure/Motor Speech  Labial: No impairment  Lingual: Decreased rate  Mandible: No impairment  Apraxic Characteristics: Marked difficulty initiating speech  Dysarthric Characteristics: Imprecise  Language Comprehension and Expression:     Verbal Expression  Primary Mode of Expression: Verbal  Automatic Speech Task:  (slow, effortful counting 1-10 805 accuracte)  Naming:  (needs cues for most: 33% Nguyen)  Naming cueing type: Verbal  Naming cueing amount: Mod-max  Sentence Completion:  (60%)  Sentence Formulation: Impaired (%)  Conversation: Non-fluent  Speech Characteristics: Word retrieval  Effective Techniques: Pacing;Phrasing;Provide extra time;Cueing  Overall Impairment: Moderate  Cueing type: Verbal;Sentence completion cue (very responsive to sentence completion cues)       Simple yes/no: not reliable  Follows 1 step commands : 100%  Follows 2 steo commands: 0%      Voice:         WNL                G Codes: In compliance with CMSs Claims Based Outcome Reporting, the following G-code set was chosen for this patient based the use of the NOMS functional outcome to quantify this patient's level of expressive language impairment. Using the NOMS, the patient was determined to be at level 3 for expressive language which correlates with the CL= 60-79% level of severity.     Based on the objective assessment provided within this note, the current, goal, and discharge g-codes are as follows:    Expression   Current  CL= 60-79%   Goals  CK= 40-59%    NOMS: 3 for expressive speech    Pain:  Pain Scale 1: Numeric (0 - 10)  Pain Intensity 1: 3  Pain Location 1: Head  After treatment:   []              Patient left in no apparent distress sitting up in chair  [x]              Patient left in no apparent distress in bed  [x]              Call bell left within reach  []              Nursing notified  [x]              Caregiver present  []              Bed alarm activated    COMMUNICATION/EDUCATION:   The patients plan of care including recommendations and planned interventions was discussed with: Registered Nurse. Patient was educated regarding His deficit(s) of aphasia as this relates to His diagnosis of bleed. He demonstrated Fair understanding as evidenced by his aphasia. Wife with clear understanding. []  Patient/family have participated as able in goal setting and plan of care. [x]  Patient/family agree to work toward stated goals and plan of care. []  Patient understands intent and goals of therapy, but is neutral about his/her participation. []  Patient is unable to participate in goal setting and plan of care.     Thank you for this referral.  Michele Blanco SLP  Time Calculation: 15 mins

## 2018-07-03 NOTE — ROUTINE PROCESS
TRANSFER - IN REPORT:    Verbal report received from Katelyn RN(name) on Iesha Vasquez  being received from ICU(unit) for ordered procedure      Report consisted of patients Situation, Background, Assessment and   Recommendations(SBAR). Information from the following report(s) SBAR, Kardex, ED Summary, Procedure Summary, Intake/Output, MAR, Recent Results and Cardiac Rhythm SR was reviewed with the receiving nurse. Opportunity for questions and clarification was provided. Assessment completed upon patients arrival to unit and care assumed.

## 2018-07-03 NOTE — ROUTINE PROCESS
0730 Bedside and Verbal shift change report given to WYATT Purvis RN (oncoming nurse) by Soni Bills RN (offgoing nurse). Report included the following information SBAR, Kardex, ED Summary, Procedure Summary, Intake/Output, MAR, Accordion and Recent Results. 200 Dr Pauly Ingram at bedside, orders received to keep patient NPO after midnight for possible surgery in am.     1640 Patient more drowsy, appears to have worsening expressive and receptive aphasia. Does not answer orientation questions, just says \"mhm\" and \"yeah\", slightly weak on RUE. Norco given for headache. Neurosurgery paged. Orders received for stat head CT, change MIVF to NS w/ 20 KCl, 10mg Decadron now, change pm Keppra to IV. Implementing orders. 200 Dr Yanely Farris at bedside examining patient, discussing option of surgery with wife. New R facial droop noted with MD exam. Wife tearful, support provided. 1910 TRANSFER - OUT REPORT:    Verbal report given to [facility] RN(name) on [place] Pass  being transferred to OR(unit) for urgent transfer       Report consisted of patients Situation, Background, Assessment and   Recommendations(SBAR). Information from the following report(s) SBAR, Kardex, ED Summary, Intake/Output, MAR, Accordion and Recent Results was reviewed with the receiving nurse. Lines:   Peripheral IV 07/01/18 Left Antecubital (Active)   Site Assessment Clean, dry, & intact 7/3/2018  4:00 PM   Phlebitis Assessment 0 7/3/2018  4:00 PM   Infiltration Assessment 0 7/3/2018  4:00 PM   Dressing Status Clean, dry, & intact 7/3/2018  4:00 PM   Dressing Type Transparent;Tape 7/3/2018  4:00 PM   Hub Color/Line Status Pink; Infusing 7/3/2018  4:00 PM   Action Taken Open ports on tubing capped 7/3/2018  4:00 PM   Alcohol Cap Used Yes 7/3/2018  4:00 PM       Peripheral IV 07/03/18 Left Forearm (Active)   Site Assessment Clean, dry, & intact 7/3/2018  5:19 PM   Phlebitis Assessment 0 7/3/2018  5:19 PM   Infiltration Assessment 0 7/3/2018  5:19 PM Dressing Status New;Clean, dry, & intact 7/3/2018  5:19 PM   Dressing Type Transparent;Tape 7/3/2018  5:19 PM   Hub Color/Line Status Blue;Capped;Flushed;Patent 7/3/2018  5:19 PM   Action Taken Open ports on tubing capped 7/3/2018  5:19 PM   Alcohol Cap Used Yes 7/3/2018  5:19 PM        Opportunity for questions and clarification was provided.       Patient transported with:   Registered Nurse  Tech

## 2018-07-03 NOTE — PROGRESS NOTES
Hospitalist Progress Note  Bhargavi Orantes MD  Answering service: [phone]       Date of Service:  7/3/2018  NAME:  Janice Barrios  :  1965  MRN:  [id number]      Admission Summary:   58-year-old man without any significant past medical history who was in his usual state of health until about a week ago when the patient developed a headache. The headache is located at the back of the head, intermittent, a dull ache. No known aggravating or relieving factors. Before the onset of the headache, the patient was swimming in the swimming pool and also diving down into 12 foot depth. The headache did not start immediately, but the headache started later on in the evening. The patient has been having the headache intermittently since this swimming episode. On the day of presentation at the emergency room, the headache became associated with slurred speech. Because of that, the patient went to the Emergency Room at [facility]. When the patient arrived at the emergency room, a CT scan of the head was obtained. The CT scan shows a subdural hematoma with mass effect. Interval history / Subjective:   Headache better today compared with yesterday per patient,patient with receptive aphasia,follows and executes commands but needs repetition. Per nurse he has been nauseated  And uncomfortable. Assessment & Plan:     Subdural hematoma with mild brain compression and mild L to R sub falcine shift. On Keppra and decadron started by the neurosurgeon. Repeat CT scan of the head and CTA of the head did not show any interval changes.  -Elevated blood pressure. On Cardene gtt and prn hydralazine. Started Norvasc to wean off iv meds.     Hypokalemia: replete    Code status: Full  DVT prophylaxis: SCD    Care Plan discussed with: Patient/Family and Nurse  Disposition: TBD     Hospital Problems  Date Reviewed: 2018          Codes Class Noted POA    * (Principal)Subdural hematoma ([facility] [address] ICD-10-CM: I62.00  ICD-9-CM: 432.1  7/1/2018 Yes                Review of Systems:   A comprehensive review of systems was negative. Vital Signs:    Last 24hrs VS reviewed since prior progress note. Most recent are:  Visit Vitals    /89    Pulse 62    Temp 98.5 °F (36.9 °C)    Resp 21    Ht 6' 3\" (1.905 m)    Wt 92.2 kg (203 lb 4.2 oz)    SpO2 97%    BMI 25.41 kg/m2         Intake/Output Summary (Last 24 hours) at 07/03/18 1612  Last data filed at 07/03/18 1500   Gross per 24 hour   Intake           2765.5 ml   Output             3075 ml   Net           -309.5 ml        Physical Examination:             Constitutional:  No acute distress, cooperative   ENT:  Oral mucous moist, oropharynx benign. Neck supple,    Resp:  CTA bilaterally. No wheezing/rhonchi/rales. No accessory muscle use   CV:  Regular rhythm, normal rate, no murmurs, gallops, rubs    GI:  Soft, non distended, non tender. normoactive bowel sounds, no hepatosplenomegaly     Musculoskeletal:  No edema, warm, 2+ pulses throughout    Neurologic: RUE 4/5. Receptive aphasia            Data Review:    Review and/or order of clinical lab test      Labs:     Recent Labs      07/02/18   0630  07/01/18   1653   WBC  11.3*  11.2*   HGB  15.0  16.3   HCT  42.0  45.8   PLT  222  258     Recent Labs      07/02/18   0242  07/01/18   1653   NA  135*  137   K  4.2  4.1  3.2*   CL  104  101   CO2  20*  29   BUN  18  14   CREA  0.95  0.97   GLU  166*  110*   CA  8.7  8.9   MG  2.1   --    PHOS  2.0*   --      Recent Labs      07/02/18   0242  07/01/18   1653   SGOT  18  20   ALT  23  25   AP  130*  141*   TBILI  1.8*  1.5*   TP  7.5  8.3*   ALB  4.0  4.4   GLOB  3.5  3.9     Recent Labs      07/01/18   1653  07/01/18   1651   INR  1.0  1.0   PTP  10.6   --    APTT  30.0   --       No results for input(s): FE, TIBC, PSAT, FERR in the last 72 hours.    No results found for: FOL, RBCF   No results for input(s): PH, PCO2, PO2 in the last 72 hours.   Recent Labs      07/02/18   0242  07/01/18   2146  07/01/18   1653   CPK  118  137   --    CKNDX  1.1  1.8   --    TROIQ  <0.05  <0.05  <0.05     Lab Results   Component Value Date/Time    Cholesterol, total 147 07/02/2018 02:42 AM    HDL Cholesterol 42 07/02/2018 02:42 AM    LDL, calculated 89.8 07/02/2018 02:42 AM    Triglyceride 76 07/02/2018 02:42 AM    CHOL/HDL Ratio 3.5 07/02/2018 02:42 AM     Lab Results   Component Value Date/Time    Glucose (POC) 117 (H) 07/01/2018 04:50 PM     No results found for: COLOR, APPRN, SPGRU, REFSG, COLETTE, PROTU, GLUCU, KETU, BILU, UROU, MARGARETTE, LEUKU, GLUKE, EPSU, BACTU, WBCU, RBCU, CASTS, UCRY      Medications Reviewed:     Current Facility-Administered Medications   Medication Dose Route Frequency    famotidine (PEPCID) tablet 20 mg  20 mg Oral Q12H    levETIRAcetam (KEPPRA) tablet 1,000 mg  1,000 mg Oral Q12H    niCARdipine (CARDENE) 25 mg in 0.9% sodium chloride 250 mL infusion  5-15 mg/hr IntraVENous TITRATE    amLODIPine (NORVASC) tablet 10 mg  10 mg Oral DAILY    hydrALAZINE (APRESOLINE) 20 mg/mL injection 10 mg  10 mg IntraVENous Q1H PRN    fentaNYL citrate (PF) injection 25 mcg  25 mcg IntraVENous Q2H PRN    ondansetron (ZOFRAN) injection 4 mg  4 mg IntraVENous Q6H PRN    naloxone (NARCAN) injection 0.4 mg  0.4 mg IntraVENous PRN    0.9% sodium chloride infusion  75 mL/hr IntraVENous CONTINUOUS    acetaminophen (TYLENOL) tablet 650 mg  650 mg Oral Q6H PRN    oxyCODONE IR (ROXICODONE) tablet 5 mg  5 mg Oral Q4H PRN    bisacodyl (DULCOLAX) tablet 5 mg  5 mg Oral DAILY PRN     ______________________________________________________________________  EXPECTED LENGTH OF STAY: 2d 4h  ACTUAL LENGTH OF STAY:          2                 Roxanne Dorsey MD

## 2018-07-03 NOTE — PROGRESS NOTES
Occupational Therapy Note  7/3/2018    Chart reviewed. Spoke with RN who initially cleared pt for therapy. Met with pt and wife at bedside. Pt with ongoing headache, nausea and fatigue and declined formal OT evaluation at this time.      Melanie Clifton, OTR/L

## 2018-07-03 NOTE — ANESTHESIA PREPROCEDURE EVALUATION
Anesthetic History   No history of anesthetic complications            Review of Systems / Medical History  Patient summary reviewed, nursing notes reviewed and pertinent labs reviewed    Pulmonary  Within defined limits                 Neuro/Psych   Within defined limits           Cardiovascular  Within defined limits                     GI/Hepatic/Renal  Within defined limits              Endo/Other  Within defined limits           Other Findings              Physical Exam    Airway  Mallampati: II  TM Distance: > 6 cm  Neck ROM: normal range of motion   Mouth opening: Normal     Cardiovascular  Regular rate and rhythm,  S1 and S2 normal,  no murmur, click, rub, or gallop             Dental  No notable dental hx       Pulmonary  Breath sounds clear to auscultation               Abdominal  GI exam deferred       Other Findings            Anesthetic Plan    ASA: 4, emergent  Anesthesia type: general    Monitoring Plan: Arterial line      Induction: Intravenous  Anesthetic plan and risks discussed with: Patient

## 2018-07-03 NOTE — PROGRESS NOTES
Physical Therapy  7/3/18    Patient chart reviewed. Discussed with RN. Met with patient and wife at bedside. Patient nauseous and fatigued. Requesting to defer therapy at this time. Will follow up tomorrow. Olive Barbosa, PT, DPT

## 2018-07-03 NOTE — PROGRESS NOTES
PCCM    Remains in ICU for Cardene infusion to maintain SBP < 160 also receiving prn hydralazine.   cardene weaning per RN  No neuro changes    To [facility] when bp controlled off of Alex Segovia MD

## 2018-07-03 NOTE — PROGRESS NOTES
Problem: Falls - Risk of  Goal: *Absence of Falls  Document Isacc Fall Risk and appropriate interventions in the flowsheet. Outcome: Progressing Towards Goal  Fall Risk Interventions:  Mobility Interventions: Communicate number of staff needed for ambulation/transfer, OT consult for ADLs, Patient to call before getting OOB, PT Consult for mobility concerns, PT Consult for assist device competence, Strengthening exercises (ROM-active/passive)         Medication Interventions: Evaluate medications/consider consulting pharmacy, Patient to call before getting OOB, Teach patient to arise slowly         History of Falls Interventions: Bed/chair exit alarm, Door open when patient unattended, Evaluate medications/consider consulting pharmacy, Room close to nurse's station        Problem: Subarachnoid Hemorrhage Stroke:Admission Day 2  Goal: Medications  Outcome: Progressing Towards Goal  Cardene gtt     Problem: Pressure Injury - Risk of  Goal: *Prevention of pressure injury  Document Erik Scale and appropriate interventions in the flowsheet. Outcome: Progressing Towards Goal  Pressure Injury Interventions: Activity Interventions: Assess need for specialty bed, Increase time out of bed, Pressure redistribution bed/mattress(bed type), PT/OT evaluation    Mobility Interventions: Assess need for specialty bed, Pressure redistribution bed/mattress (bed type), Turn and reposition approx.  every two hours(pillow and wedges), PT/OT evaluation    Nutrition Interventions: Document food/fluid/supplement intake, Discuss nutritional consult with provider, Offer support with meals,snacks and hydration    Friction and Shear Interventions: Lift sheet, Minimize layers

## 2018-07-04 ENCOUNTER — APPOINTMENT (OUTPATIENT)
Dept: CT IMAGING | Age: 53
DRG: 025 | End: 2018-07-04
Attending: NEUROLOGICAL SURGERY
Payer: COMMERCIAL

## 2018-07-04 LAB
ANION GAP SERPL CALC-SCNC: 8 MMOL/L (ref 5–15)
BASOPHILS # BLD: 0 K/UL (ref 0–0.1)
BASOPHILS NFR BLD: 0 % (ref 0–1)
BUN SERPL-MCNC: 25 MG/DL (ref 6–20)
BUN/CREAT SERPL: 23 (ref 12–20)
CALCIUM SERPL-MCNC: 8.5 MG/DL (ref 8.5–10.1)
CHLORIDE SERPL-SCNC: 107 MMOL/L (ref 97–108)
CO2 SERPL-SCNC: 22 MMOL/L (ref 21–32)
CREAT SERPL-MCNC: 1.09 MG/DL (ref 0.7–1.3)
DIFFERENTIAL METHOD BLD: ABNORMAL
EOSINOPHIL # BLD: 0 K/UL (ref 0–0.4)
EOSINOPHIL NFR BLD: 0 % (ref 0–7)
ERYTHROCYTE [DISTWIDTH] IN BLOOD BY AUTOMATED COUNT: 13.2 % (ref 11.5–14.5)
GLUCOSE BLD STRIP.AUTO-MCNC: 145 MG/DL (ref 65–100)
GLUCOSE BLD STRIP.AUTO-MCNC: 150 MG/DL (ref 65–100)
GLUCOSE BLD STRIP.AUTO-MCNC: 161 MG/DL (ref 65–100)
GLUCOSE SERPL-MCNC: 173 MG/DL (ref 65–100)
HCT VFR BLD AUTO: 40.2 % (ref 36.6–50.3)
HGB BLD-MCNC: 13.9 G/DL (ref 12.1–17)
IMM GRANULOCYTES # BLD: 0.2 K/UL (ref 0–0.04)
IMM GRANULOCYTES NFR BLD AUTO: 1 % (ref 0–0.5)
LYMPHOCYTES # BLD: 0.9 K/UL (ref 0.8–3.5)
LYMPHOCYTES NFR BLD: 5 % (ref 12–49)
MCH RBC QN AUTO: 30.8 PG (ref 26–34)
MCHC RBC AUTO-ENTMCNC: 34.6 G/DL (ref 30–36.5)
MCV RBC AUTO: 89.1 FL (ref 80–99)
MONOCYTES # BLD: 0.7 K/UL (ref 0–1)
MONOCYTES NFR BLD: 4 % (ref 5–13)
NEUTS SEG # BLD: 15.4 K/UL (ref 1.8–8)
NEUTS SEG NFR BLD: 90 % (ref 32–75)
NRBC # BLD: 0 K/UL (ref 0–0.01)
NRBC BLD-RTO: 0 PER 100 WBC
PLATELET # BLD AUTO: 237 K/UL (ref 150–400)
PMV BLD AUTO: 10.3 FL (ref 8.9–12.9)
POTASSIUM SERPL-SCNC: 4.2 MMOL/L (ref 3.5–5.1)
RBC # BLD AUTO: 4.51 M/UL (ref 4.1–5.7)
RBC MORPH BLD: ABNORMAL
SERVICE CMNT-IMP: ABNORMAL
SODIUM SERPL-SCNC: 137 MMOL/L (ref 136–145)
WBC # BLD AUTO: 17.2 K/UL (ref 4.1–11.1)

## 2018-07-04 PROCEDURE — 92610 EVALUATE SWALLOWING FUNCTION: CPT

## 2018-07-04 PROCEDURE — 97112 NEUROMUSCULAR REEDUCATION: CPT

## 2018-07-04 PROCEDURE — 82962 GLUCOSE BLOOD TEST: CPT

## 2018-07-04 PROCEDURE — 70450 CT HEAD/BRAIN W/O DYE: CPT

## 2018-07-04 PROCEDURE — G8979 MOBILITY GOAL STATUS: HCPCS

## 2018-07-04 PROCEDURE — 65610000006 HC RM INTENSIVE CARE

## 2018-07-04 PROCEDURE — 97161 PT EVAL LOW COMPLEX 20 MIN: CPT

## 2018-07-04 PROCEDURE — G8988 SELF CARE GOAL STATUS: HCPCS

## 2018-07-04 PROCEDURE — 92507 TX SP LANG VOICE COMM INDIV: CPT

## 2018-07-04 PROCEDURE — 74011250637 HC RX REV CODE- 250/637: Performed by: NEUROLOGICAL SURGERY

## 2018-07-04 PROCEDURE — 77010033678 HC OXYGEN DAILY

## 2018-07-04 PROCEDURE — 74011000250 HC RX REV CODE- 250: Performed by: HOSPITALIST

## 2018-07-04 PROCEDURE — 97535 SELF CARE MNGMENT TRAINING: CPT

## 2018-07-04 PROCEDURE — 74011250636 HC RX REV CODE- 250/636: Performed by: HOSPITALIST

## 2018-07-04 PROCEDURE — 97116 GAIT TRAINING THERAPY: CPT

## 2018-07-04 PROCEDURE — 74011636637 HC RX REV CODE- 636/637: Performed by: NEUROLOGICAL SURGERY

## 2018-07-04 PROCEDURE — 97530 THERAPEUTIC ACTIVITIES: CPT

## 2018-07-04 PROCEDURE — G8987 SELF CARE CURRENT STATUS: HCPCS

## 2018-07-04 PROCEDURE — 74011000258 HC RX REV CODE- 258: Performed by: NEUROLOGICAL SURGERY

## 2018-07-04 PROCEDURE — 74011000250 HC RX REV CODE- 250: Performed by: NEUROLOGICAL SURGERY

## 2018-07-04 PROCEDURE — 74011250637 HC RX REV CODE- 250/637: Performed by: HOSPITALIST

## 2018-07-04 PROCEDURE — 74011250636 HC RX REV CODE- 250/636: Performed by: NEUROLOGICAL SURGERY

## 2018-07-04 PROCEDURE — 80048 BASIC METABOLIC PNL TOTAL CA: CPT | Performed by: NEUROLOGICAL SURGERY

## 2018-07-04 PROCEDURE — 36415 COLL VENOUS BLD VENIPUNCTURE: CPT | Performed by: NEUROLOGICAL SURGERY

## 2018-07-04 PROCEDURE — 85025 COMPLETE CBC W/AUTO DIFF WBC: CPT | Performed by: NEUROLOGICAL SURGERY

## 2018-07-04 PROCEDURE — 97165 OT EVAL LOW COMPLEX 30 MIN: CPT

## 2018-07-04 PROCEDURE — G8978 MOBILITY CURRENT STATUS: HCPCS

## 2018-07-04 RX ORDER — HYDRALAZINE HYDROCHLORIDE 20 MG/ML
10 INJECTION INTRAMUSCULAR; INTRAVENOUS
Status: COMPLETED | OUTPATIENT
Start: 2018-07-04 | End: 2018-07-04

## 2018-07-04 RX ORDER — SODIUM CHLORIDE 0.9 % (FLUSH) 0.9 %
5-10 SYRINGE (ML) INJECTION AS NEEDED
Status: DISCONTINUED | OUTPATIENT
Start: 2018-07-04 | End: 2018-07-11 | Stop reason: HOSPADM

## 2018-07-04 RX ORDER — HYDRALAZINE HYDROCHLORIDE 20 MG/ML
10 INJECTION INTRAMUSCULAR; INTRAVENOUS
Status: DISCONTINUED | OUTPATIENT
Start: 2018-07-04 | End: 2018-07-05

## 2018-07-04 RX ORDER — ONDANSETRON 2 MG/ML
4 INJECTION INTRAMUSCULAR; INTRAVENOUS
Status: DISCONTINUED | OUTPATIENT
Start: 2018-07-04 | End: 2018-07-11 | Stop reason: HOSPADM

## 2018-07-04 RX ORDER — LABETALOL HYDROCHLORIDE 5 MG/ML
10 INJECTION, SOLUTION INTRAVENOUS
Status: COMPLETED | OUTPATIENT
Start: 2018-07-04 | End: 2018-07-04

## 2018-07-04 RX ORDER — HYDROMORPHONE HYDROCHLORIDE 2 MG/1
2 TABLET ORAL
Status: DISCONTINUED | OUTPATIENT
Start: 2018-07-04 | End: 2018-07-11 | Stop reason: HOSPADM

## 2018-07-04 RX ORDER — HYDROMORPHONE HYDROCHLORIDE 1 MG/ML
1 INJECTION, SOLUTION INTRAMUSCULAR; INTRAVENOUS; SUBCUTANEOUS
Status: DISCONTINUED | OUTPATIENT
Start: 2018-07-04 | End: 2018-07-08

## 2018-07-04 RX ORDER — LABETALOL HYDROCHLORIDE 5 MG/ML
10 INJECTION, SOLUTION INTRAVENOUS
Status: DISCONTINUED | OUTPATIENT
Start: 2018-07-04 | End: 2018-07-05

## 2018-07-04 RX ORDER — CEFAZOLIN SODIUM/WATER 2 G/20 ML
2 SYRINGE (ML) INTRAVENOUS EVERY 8 HOURS
Status: COMPLETED | OUTPATIENT
Start: 2018-07-04 | End: 2018-07-04

## 2018-07-04 RX ORDER — DOCUSATE SODIUM 100 MG/1
100 CAPSULE, LIQUID FILLED ORAL 2 TIMES DAILY
Status: DISCONTINUED | OUTPATIENT
Start: 2018-07-04 | End: 2018-07-11 | Stop reason: HOSPADM

## 2018-07-04 RX ORDER — DEXTROSE 50 % IN WATER (D50W) INTRAVENOUS SYRINGE
12.5-25 AS NEEDED
Status: DISCONTINUED | OUTPATIENT
Start: 2018-07-04 | End: 2018-07-11 | Stop reason: HOSPADM

## 2018-07-04 RX ORDER — OXYCODONE AND ACETAMINOPHEN 5; 325 MG/1; MG/1
1 TABLET ORAL
Status: DISCONTINUED | OUTPATIENT
Start: 2018-07-04 | End: 2018-07-11 | Stop reason: HOSPADM

## 2018-07-04 RX ORDER — SODIUM CHLORIDE 0.9 % (FLUSH) 0.9 %
5-10 SYRINGE (ML) INJECTION EVERY 8 HOURS
Status: DISCONTINUED | OUTPATIENT
Start: 2018-07-04 | End: 2018-07-11 | Stop reason: HOSPADM

## 2018-07-04 RX ORDER — ACETAMINOPHEN 325 MG/1
650 TABLET ORAL
Status: DISCONTINUED | OUTPATIENT
Start: 2018-07-04 | End: 2018-07-11 | Stop reason: HOSPADM

## 2018-07-04 RX ORDER — DEXAMETHASONE SODIUM PHOSPHATE 4 MG/ML
4 INJECTION, SOLUTION INTRA-ARTICULAR; INTRALESIONAL; INTRAMUSCULAR; INTRAVENOUS; SOFT TISSUE EVERY 8 HOURS
Status: COMPLETED | OUTPATIENT
Start: 2018-07-04 | End: 2018-07-05

## 2018-07-04 RX ORDER — MAGNESIUM SULFATE 100 %
4 CRYSTALS MISCELLANEOUS AS NEEDED
Status: DISCONTINUED | OUTPATIENT
Start: 2018-07-04 | End: 2018-07-11 | Stop reason: HOSPADM

## 2018-07-04 RX ADMIN — LABETALOL HYDROCHLORIDE 10 MG: 5 INJECTION, SOLUTION INTRAVENOUS at 11:07

## 2018-07-04 RX ADMIN — SODIUM CHLORIDE 15 MG/HR: 900 INJECTION, SOLUTION INTRAVENOUS at 07:25

## 2018-07-04 RX ADMIN — SODIUM CHLORIDE 1000 MG: 900 INJECTION, SOLUTION INTRAVENOUS at 20:49

## 2018-07-04 RX ADMIN — AMLODIPINE BESYLATE 10 MG: 5 TABLET ORAL at 08:43

## 2018-07-04 RX ADMIN — DEXAMETHASONE SODIUM PHOSPHATE 4 MG: 4 INJECTION, SOLUTION INTRAMUSCULAR; INTRAVENOUS at 08:43

## 2018-07-04 RX ADMIN — SODIUM CHLORIDE 15 MG/HR: 900 INJECTION, SOLUTION INTRAVENOUS at 05:21

## 2018-07-04 RX ADMIN — DEXAMETHASONE SODIUM PHOSPHATE 4 MG: 4 INJECTION, SOLUTION INTRAMUSCULAR; INTRAVENOUS at 13:30

## 2018-07-04 RX ADMIN — ACETAMINOPHEN 650 MG: 325 TABLET ORAL at 13:30

## 2018-07-04 RX ADMIN — Medication 2 G: at 20:49

## 2018-07-04 RX ADMIN — HYDRALAZINE HYDROCHLORIDE 10 MG: 20 INJECTION INTRAMUSCULAR; INTRAVENOUS at 04:08

## 2018-07-04 RX ADMIN — HUMAN INSULIN 2 UNITS: 100 INJECTION, SOLUTION SUBCUTANEOUS at 17:07

## 2018-07-04 RX ADMIN — DOCUSATE SODIUM 100 MG: 100 CAPSULE, LIQUID FILLED ORAL at 08:43

## 2018-07-04 RX ADMIN — POTASSIUM CHLORIDE AND SODIUM CHLORIDE: 900; 150 INJECTION, SOLUTION INTRAVENOUS at 07:25

## 2018-07-04 RX ADMIN — Medication 10 ML: at 21:04

## 2018-07-04 RX ADMIN — HYDRALAZINE HYDROCHLORIDE 10 MG: 20 INJECTION INTRAMUSCULAR; INTRAVENOUS at 09:00

## 2018-07-04 RX ADMIN — HYDRALAZINE HYDROCHLORIDE 10 MG: 20 INJECTION INTRAMUSCULAR; INTRAVENOUS at 18:48

## 2018-07-04 RX ADMIN — LABETALOL HYDROCHLORIDE 10 MG: 5 INJECTION, SOLUTION INTRAVENOUS at 22:07

## 2018-07-04 RX ADMIN — HUMAN INSULIN 2 UNITS: 100 INJECTION, SOLUTION SUBCUTANEOUS at 08:54

## 2018-07-04 RX ADMIN — ACETAMINOPHEN 650 MG: 325 TABLET ORAL at 18:11

## 2018-07-04 RX ADMIN — Medication 10 ML: at 06:08

## 2018-07-04 RX ADMIN — HUMAN INSULIN 2 UNITS: 100 INJECTION, SOLUTION SUBCUTANEOUS at 12:16

## 2018-07-04 RX ADMIN — Medication 2 G: at 13:30

## 2018-07-04 RX ADMIN — SODIUM CHLORIDE 15 MG/HR: 900 INJECTION, SOLUTION INTRAVENOUS at 02:44

## 2018-07-04 RX ADMIN — ACETAMINOPHEN 650 MG: 325 TABLET ORAL at 08:59

## 2018-07-04 RX ADMIN — DOCUSATE SODIUM 100 MG: 100 CAPSULE, LIQUID FILLED ORAL at 17:07

## 2018-07-04 RX ADMIN — SODIUM CHLORIDE 1000 MG: 900 INJECTION, SOLUTION INTRAVENOUS at 09:44

## 2018-07-04 RX ADMIN — DEXAMETHASONE SODIUM PHOSPHATE 4 MG: 4 INJECTION, SOLUTION INTRAMUSCULAR; INTRAVENOUS at 21:06

## 2018-07-04 RX ADMIN — LABETALOL HYDROCHLORIDE 10 MG: 5 INJECTION, SOLUTION INTRAVENOUS at 17:13

## 2018-07-04 RX ADMIN — POTASSIUM CHLORIDE AND SODIUM CHLORIDE: 900; 150 INJECTION, SOLUTION INTRAVENOUS at 17:12

## 2018-07-04 RX ADMIN — LABETALOL HYDROCHLORIDE 10 MG: 5 INJECTION, SOLUTION INTRAVENOUS at 21:05

## 2018-07-04 RX ADMIN — Medication 2 G: at 04:07

## 2018-07-04 RX ADMIN — ACETAMINOPHEN 650 MG: 325 TABLET ORAL at 22:07

## 2018-07-04 NOTE — PROGRESS NOTES
Ct looks good. No sdh. Some diffuse left hemisphere edema persists, this should settle down over days. Continued expressive aphasia. Words and phrases. Strength improved. Mobilize and initiate therapies.   Adv diet and activity

## 2018-07-04 NOTE — PERIOP NOTES
Floseal added to sterile field for topical application by MD.  Lot #: [id number]. Exp. Date:04/26/2020.

## 2018-07-04 NOTE — PROGRESS NOTES
Hospitalist Progress Note  Shamika Jefferson MD  Answering service: [phone]       Date of Service:  2018  NAME:  Francy Burgos  :  1965  MRN:  [id number]      Admission Summary:   50-year-old man without any significant past medical history who was in his usual state of health until about a week ago when the patient developed a headache. The headache is located at the back of the head, intermittent, a dull ache. No known aggravating or relieving factors. Before the onset of the headache, the patient was swimming in the swimming pool and also diving down into 12 foot depth. The headache did not start immediately, but the headache started later on in the evening. The patient has been having the headache intermittently since this swimming episode. On the day of presentation at the emergency room, the headache became associated with slurred speech. Because of that, the patient went to the Emergency Room at [place] [address]. When the patient arrived at the emergency room, a CT scan of the head was obtained. The CT scan shows a subdural hematoma with mass effect. Interval history / Subjective:   Alert ant and awake. Speech clear. Right arm weakness much improved post surgery. .     Assessment & Plan:     Subdural hematoma with mild brain compression and mild L to R sub falcine shift. On Keppra and decadron started by the neurosurgeon. Repeat CT scan of the head and CTA of the head did not show any interval changes.  -Underwent crani on 7/3  -Speech and r arm weakness improved. Elevated blood pressure. Norvasc added. Hydralazine iv prn per neurosurgical parameters. Hypokalemia: replete    Code status: Full  DVT prophylaxis: SCD    Care Plan discussed with:Patient and wife.   Disposition: TBD     Hospital Problems  Date Reviewed: 7/3/2018          Codes Class Noted POA    * (Principal)Subdural hematoma ([facility] [address] ICD-10-CM: I62.00  ICD-9-CM: 432.1  7/1/2018 Yes                Review of Systems:   A comprehensive review of systems was negative. Vital Signs:    Last 24hrs VS reviewed since prior progress note. Most recent are:  Visit Vitals    /82    Pulse 75    Temp 98.9 °F (37.2 °C)    Resp 16    Ht 6' 3\" (1.905 m)    Wt 96.8 kg (213 lb 6.5 oz)    SpO2 94%    BMI 26.67 kg/m2         Intake/Output Summary (Last 24 hours) at 07/04/18 1620  Last data filed at 07/04/18 1400   Gross per 24 hour   Intake          4582.83 ml   Output             2835 ml   Net          1747.83 ml        Physical Examination:             Constitutional:  No acute distress, cooperative   ENT:  Oral mucous moist, oropharynx benign. Neck supple,    Resp:  CTA bilaterally. No wheezing/rhonchi/rales. No accessory muscle use   CV:  Regular rhythm, normal rate, no murmurs, gallops, rubs    GI:  Soft, non distended, non tender. normoactive bowel sounds, no hepatosplenomegaly     Musculoskeletal:  No edema, warm, 2+ pulses throughout    Neurologic: RUE slight weakness. Speech almost normal.            Data Review:    Review and/or order of clinical lab test      Labs:     Recent Labs      07/04/18   0522  07/02/18   0630   WBC  17.2*  11.3*   HGB  13.9  15.0   HCT  40.2  42.0   PLT  237  222     Recent Labs      07/04/18   0522  07/02/18   0242  07/01/18   1653   NA  137  135*  137   K  4.2  4.2  4.1  3.2*   CL  107  104  101   CO2  22  20*  29   BUN  25*  18  14   CREA  1.09  0.95  0.97   GLU  173*  166*  110*   CA  8.5  8.7  8.9   MG   --   2.1   --    PHOS   --   2.0*   --      Recent Labs      07/02/18   0242  07/01/18   1653   SGOT  18  20   ALT  23  25   AP  130*  141*   TBILI  1.8*  1.5*   TP  7.5  8.3*   ALB  4.0  4.4   GLOB  3.5  3.9     Recent Labs      07/01/18   1653  07/01/18   1651   INR  1.0  1.0   PTP  10.6   --    APTT  30.0   --       No results for input(s): FE, TIBC, PSAT, FERR in the last 72 hours.    No results found for: FOL, RBCF   No results for input(s): PH, PCO2, PO2 in the last 72 hours.   Recent Labs      07/02/18   0242  07/01/18   2146  07/01/18   1653   CPK  118  137   --    CKNDX  1.1  1.8   --    TROIQ  <0.05  <0.05  <0.05     Lab Results   Component Value Date/Time    Cholesterol, total 147 07/02/2018 02:42 AM    HDL Cholesterol 42 07/02/2018 02:42 AM    LDL, calculated 89.8 07/02/2018 02:42 AM    Triglyceride 76 07/02/2018 02:42 AM    CHOL/HDL Ratio 3.5 07/02/2018 02:42 AM     Lab Results   Component Value Date/Time    Glucose (POC) 145 (H) 07/04/2018 12:10 PM    Glucose (POC) 161 (H) 07/04/2018 08:40 AM    Glucose (POC) 117 (H) 07/01/2018 04:50 PM     No results found for: COLOR, APPRN, SPGRU, REFSG, COLETTE, PROTU, GLUCU, KETU, BILU, UROU, MARGARETTE, LEUKU, GLUKE, EPSU, BACTU, WBCU, RBCU, CASTS, UCRY      Medications Reviewed:     Current Facility-Administered Medications   Medication Dose Route Frequency    sodium chloride (NS) flush 5-10 mL  5-10 mL IntraVENous Q8H    sodium chloride (NS) flush 5-10 mL  5-10 mL IntraVENous PRN    acetaminophen (TYLENOL) tablet 650 mg  650 mg Oral Q4H PRN    oxyCODONE-acetaminophen (PERCOCET) 5-325 mg per tablet 1 Tab  1 Tab Oral Q4H PRN    HYDROmorphone (DILAUDID) syringe 1 mg  1 mg IntraVENous Q3H PRN    ceFAZolin (ANCEF) 2 g/20 mL in sterile water IV syringe  2 g IntraVENous Q8H    levETIRAcetam (KEPPRA) 1,000 mg in 0.9% sodium chloride 100 mL IVPB  1,000 mg IntraVENous Q12H    ondansetron (ZOFRAN) injection 4 mg  4 mg IntraVENous Q4H PRN    docusate sodium (COLACE) capsule 100 mg  100 mg Oral BID    HYDROmorphone (DILAUDID) tablet 2 mg  2 mg Oral Q4H PRN    dexamethasone (DECADRON) 4 mg/mL injection 4 mg  4 mg IntraVENous Q8H    insulin regular (NOVOLIN R, HUMULIN R) injection   SubCUTAneous Q6H    glucose chewable tablet 16 g  4 Tab Oral PRN    dextrose (D50W) injection syrg 12.5-25 g  12.5-25 g IntraVENous PRN    glucagon (GLUCAGEN) injection 1 mg  1 mg IntraMUSCular PRN    labetalol (NORMODYNE;TRANDATE) injection 10 mg  10 mg IntraVENous Q15MIN PRN    Or    hydrALAZINE (APRESOLINE) 20 mg/mL injection 10 mg  10 mg IntraVENous Q6H PRN    amLODIPine (NORVASC) tablet 10 mg  10 mg Oral DAILY    0.9% sodium chloride with KCl 20 mEq/L infusion   IntraVENous CONTINUOUS    bisacodyl (DULCOLAX) tablet 5 mg  5 mg Oral DAILY PRN     ______________________________________________________________________  EXPECTED LENGTH OF STAY: 2d 4h  ACTUAL LENGTH OF STAY:          3                 Nunu Magallanes MD

## 2018-07-04 NOTE — OP NOTES
[address]  OPERATIVE REPORT    Silvio Ibrahim  MR#: [id number]  : 1965  ACCOUNT #: [de-identified]   DATE OF SERVICE: 2018    PREOPERATIVE DIAGNOSIS:  Large acute and subacute left frontotemporoparietal subdural hematoma. POSTOPERATIVE DIAGNOSIS:  Large acute and subacute left frontotemporoparietal subdural hematoma. PROCEDURE:  Left fronto-temporal  craniotomy with evacuation of subdural hematoma. SURGEON:  Irasema Snell MD.    ASSISTANT:  Nunu Colon. ANESTHESIA:  General endotracheal anesthesia. BLOOD LOSS:  100 mL. COMPLICATIONS:  None. SPECIMENS:  None. IMPLANTS:  [employer]omet craniofacial plating system. FINDINGS:  Large acute subdural hematoma pan hemispheric with pressure on the brain. OPERATIVE INDICATION:  This is a 50-year-old gentleman who had come in I [facility] on  with headache and intermittent aphasia. A CT showed a subdural hematoma with some mass effect on the left hemisphere and midline shift. He is being followed neurologically. I received a phone call in the evening of 2018 while on call with the report that the patient had become progressively lethargic and markedly more aphasic and weak on the right hand side. After an emergency CT scan and consultation with the family, informed consent was obtained for proceeding emergently to the operating room for craniotomy. OPERATION IN DETAIL:  The patient was taken to the operating room and placed under general endotracheal anesthesia. All necessary lines and monitors were placed. He was given appropriate dose of IV antibiotics. SCDs and Culp were placed. He was placed supine on the operating table. Shoulder roll under his left shoulder, head turned to the right, head on a gel donut. The left side of the head was clipped, prepped and draped in standard sterile fashion.       A standard left frontotemporoparietal craniotomy incision was made with a skin knife, carried down with Bovie electrocautery through the scalp and galea. Periosteal was used for elevation. The temporalis muscle and fascia was divided and tacked up anteriorly and inferiorly with the scalp flap exposing the left side of the head/the left side of the skull. Symone clips were used for hemostasis. High-speed drill was used to perform a large frontotemporoparietal craniotomy. This was elevated without difficulty. Bipolar electrocautery was used to cauterize dural arteries. FloSeal was used for epidural venous bleeding. The dura was then opened and significant blood came out under pressure. I then opened up the dura and tacked it up over the muscle. There was a large organized thick clot over the hemisphere. This was irrigated out and removed with suction. As I removed the clot, there was a small pumper along the cortex that was cauterized individually. I then evacuated hematoma from the middle fossa, the frontal pole, the convexity towards the midline, and posteriorly all the way back to the occipital region. There was no obvious bleeding. There are no major venous bleeders interrupted along the convexity. I continued to irrigate and pack the subdural space with cotton patties for hemostasis and protection and the subdural space appeared entirely dry without any bleeding. I then closed the dura with interrupted 4-0 Nurolon sutures. Multiple circumferential epidural tack-up sutures were placed as well as 2 central epidural tack-up sutures. The bone flap was reaffixed with the el?et craniofacial plating system. A Hemovac drain was placed underneath the scalp and temporalis muscle and brought out through a stab incision. The temporalis muscle and fascia was reattached with 2-0 Vicryl. The galea was closed with inverted 2-0 Vicryl. The skin was stapled shut. Wounds were clean, dry, dressed with sterile dressing.   The patient was then extubated, taken to recovery room in stable condition. MD PATEL Lopez / ÁNGEL  D: 07/03/2018 22:59     T: 07/03/2018 23:45  JOB #: [id number]  CC: Betty Quintanilla MD

## 2018-07-04 NOTE — ANESTHESIA PROCEDURE NOTES
Arterial Line Placement    Performed by: Lidia Jimenez  Authorized by: Lidia Jimenez     Pre-Procedure  Preanesthetic Checklist: patient identified, risks and benefits discussed, anesthesia consent, site marked, patient being monitored, timeout performed and patient being monitored      Procedure:   Prep:  Chlorhexidine  Seldinger Technique?: Yes    Orientation:  Right  Location:  Radial artery  Catheter size:  20 G  Number of attempts:  1    Assessment:   Post-procedure:  Line secured and sterile dressing applied  Patient Tolerance:  Patient tolerated the procedure well with no immediate complications

## 2018-07-04 NOTE — PROGRESS NOTES
1930: Bedside shift change report given to [facility] (oncoming nurse) by Nela Jacob RN (offgoing nurse). Report included the following information SBAR, Intake/Output, MAR, Recent Results, Cardiac Rhythm NSR and Alarm Parameters . 1945: Pt transported to OR holding with RN and monitor. 2358: TRANSFER - IN REPORT:    Verbal report received from [facility] (name) on Rg Sanchez  being received from PACU (unit) for routine post - op      Report consisted of patients Situation, Background, Assessment and   Recommendations(SBAR). Information from the following report(s) SBAR, OR Summary, Procedure Summary, Intake/Output, MAR, Cardiac Rhythm NSR and Alarm Parameters  was reviewed with the receiving nurse. Opportunity for questions and clarification was provided. Assessment completed upon patients arrival to unit and care assumed. 0008: Pt arrived to unit, placed on monitor, assessment performed. Pt arrived with martines, art line, and hemovac in place, dressing C/D/I. Pt moves all extremities RUE slightly weaker than LUE, BLE equal. PERRLA at 2mm. Pt with expressive and some receptive aphasia, not able to speak complete sentences appropriately answers yes/no orientation questions and nods appropriately. Delayed command following with RUE response slower than LUE response. Cardene titrated for SBP <140    Primary Nurse Samy Thomas RN and Rafael Childs RN performed a dual skin assessment on this patient No impairment noted  Erik score is 20    Bedside shift change report given to Chrissie Soto (oncoming nurse) by Tamera Giles RN (offgoing nurse). Report included the following information SBAR, OR Summary, Intake/Output, MAR, Recent Results, Cardiac Rhythm NSR and Alarm Parameters .

## 2018-07-04 NOTE — PROGRESS NOTES
PULMONARY/Critical Care/SLEEP MEDICINE - [facility]  Name: Rg Sanchez   : 1965   MRN: [id number]   Date: 2018 11:20 AM       []      The patient is critically ill on      []      Mechanical ventilation []      Pressors   []      BiPAP []                       arousable  Discussed with nurse and patient's wife    Vital Signs:    Visit Vitals    /76    Pulse 85    Temp 98.3 °F (36.8 °C)    Resp 14    Ht 6' 3\" (1.905 m)    Wt 96.8 kg (213 lb 6.5 oz)    SpO2 92%    BMI 26.67 kg/m2       O2 Device: Room air   O2 Flow Rate (L/min): 2 l/min   Temp (24hrs), Av.5 °F (36.9 °C), Min:97.5 °F (36.4 °C), Max:99.3 °F (37.4 °C)       Intake/Output:   Last shift:       07 -  190  In: 971.3 [I.V.:971.3]  Out: 350 [Urine:350]  Last 3 shifts:  190 -  0700  In: 5990.4 [I.V.:5990.4]  Out: 3642 [Urine:4700; Drains:60]    Intake/Output Summary (Last 24 hours) at 18 1120  Last data filed at 18 1047   Gross per 24 hour   Intake          4496.16 ml   Output             2735 ml   Net          1761.16 ml     Hemodynamics:   PAP:     Wedge:     CVP:      CO:     CI:     SVR:     PVR:        Ventilator Settings:                Physical Exam:    General: []      Intubated/sedated []      No acute distress [x]          []      Ill appearing []       []            HEENT: []     ETT []      PERRL []     NGT     []      Anicteric Mucosa:[]      moist   []      dry [x]      NS drain      Resp: []      Wheeze [x]      Clear to ascultation bilaterally []      Accessory muscle use    []      Rales []      Chest tube:  []      Air leak []          []      Ronchi []      Crepitus []         []      Coarse bilateral ventilator breath sounds      CV: [x]      Regular []      Tachycardia []          []      Irregular []      Bradycardic []          []      Murmur [x]      S1 []          []     RUB [x]    S2 []            GI: [x]      Soft  []      NG Tube Bowel sounds: [x]      present []      absent    []      Firm []      PEG []    Tender      []      Distended  []            : []      Culp []      Hematuria []          []      Clear urine []       []            MSK:    []      SCDs []      Edema []   clubbing       []      No deformities []     Cyanosis  []            Skin: [x]      Warm [x]      Dry  []   Mottled       []      Cool []      Moist []          []     Lesions []      Diaphoretic []          []      Rash  []      Cyanosis []            N-psych: []      Sedated  []      Agitated []     Moves all extremities     []      Alert  Follows commands:   [x]      Yes  []      No [x]     oriented       Devices: []      PA Catheter []      Tracheostomy []          []      Central Line []        []        Chest tube:  Air leak? []        Y/[]        N    []          []      PICC []       []            DATA:   Current Facility-Administered Medications   Medication Dose Route Frequency    sodium chloride (NS) flush 5-10 mL  5-10 mL IntraVENous Q8H    sodium chloride (NS) flush 5-10 mL  5-10 mL IntraVENous PRN    acetaminophen (TYLENOL) tablet 650 mg  650 mg Oral Q4H PRN    oxyCODONE-acetaminophen (PERCOCET) 5-325 mg per tablet 1 Tab  1 Tab Oral Q4H PRN    HYDROmorphone (DILAUDID) syringe 1 mg  1 mg IntraVENous Q3H PRN    ceFAZolin (ANCEF) 2 g/20 mL in sterile water IV syringe  2 g IntraVENous Q8H    levETIRAcetam (KEPPRA) 1,000 mg in 0.9% sodium chloride 100 mL IVPB  1,000 mg IntraVENous Q12H    ondansetron (ZOFRAN) injection 4 mg  4 mg IntraVENous Q4H PRN    docusate sodium (COLACE) capsule 100 mg  100 mg Oral BID    HYDROmorphone (DILAUDID) tablet 2 mg  2 mg Oral Q4H PRN    dexamethasone (DECADRON) 4 mg/mL injection 4 mg  4 mg IntraVENous Q8H    insulin regular (NOVOLIN R, HUMULIN R) injection   SubCUTAneous Q6H    glucose chewable tablet 16 g  4 Tab Oral PRN    dextrose (D50W) injection syrg 12.5-25 g  12.5-25 g IntraVENous PRN    glucagon (GLUCAGEN) injection 1 mg  1 mg IntraMUSCular PRN    amLODIPine (NORVASC) tablet 10 mg  10 mg Oral DAILY    0.9% sodium chloride with KCl 20 mEq/L infusion   IntraVENous CONTINUOUS    bisacodyl (DULCOLAX) tablet 5 mg  5 mg Oral DAILY PRN       Telemetry: []        Sinus []        A-flutter []        Paced    []        A-fib []        Multiple PVCs                  Labs:  Recent Labs      07/04/18   0522  07/02/18   0630  07/01/18   1653   WBC  17.2*  11.3*  11.2*   HGB  13.9  15.0  16.3   HCT  40.2  42.0  45.8   PLT  237  222  258     Recent Labs      07/04/18   0522  07/02/18   0242  07/01/18   1653  07/01/18   1651   NA  137  135*  137   --    K  4.2  4.2  4.1  3.2*   --    CL  107  104  101   --    CO2  22  20*  29   --    GLU  173*  166*  110*   --    BUN  25*  18  14   --    CREA  1.09  0.95  0.97   --    CA  8.5  8.7  8.9   --    MG   --   2.1   --    --    PHOS   --   2.0*   --    --    ALB   --   4.0  4.4   --    TBILI   --   1.8*  1.5*   --    SGOT   --   18  20   --    ALT   --   23  25   --    INR   --    --   1.0  1.0     No results for input(s): PH, PCO2, PO2, HCO3, FIO2 in the last 72 hours. Imaging:  []      I have personally reviewed the patients radiographs     []      No change from prior, tubes and lines in adequate position  []      Improved   []      Worsening     IMPRESSION:     The patient is: []       acutely ill Risk of deterioration: []       moderate    []       critically ill  []       high     Principal Problem:    Subdural hematoma ([facility] [address] (7/1/2018)      AMS -improved  Abnormal speech -improved  S/p SDH evacuation   HTN-on cardene gtt  ·     PLAN:  1. BP control  2. Wean and discontinue cardene  3. Step down transfer when off cardene  4.  SCD for DVT ppx     []      See my orders for details    My assessment/plan was discussed with:  []      nursing []      PT/OT    []      respiratory therapy []         []      family []           []      Total critical care time exclusive of procedures minutes  Alirio Cabrera MD

## 2018-07-04 NOTE — PROGRESS NOTES
Problem: Self Care Deficits Care Plan (Adult)  Goal: *Acute Goals and Plan of Care (Insert Text)  Occupational Therapy Goals  Initiated 7/4/2018    1. Patient will perform self-feeding using RUE as FM assists with AE PRN with Otoniel within 7 days. 2.  Patient will perform bathing standing at sink with CGA for balance and Otoniel tasks within 7 days. 3.  Patient will perform UB dressing, including FM aspects, with Otoniel within 7 days. 4.  Patient will perform BUE coordination tasks with Otoniel within 7 days. 5.  Patient will tolerate standing ADLs for 8 minutes with no LOB and CGA-SBA within 7 days. Occupational Therapy EVALUATION  Patient: Nina Carlos (49 y.o. male)  Date: 7/4/2018  Primary Diagnosis: Hemorragic stroke  Subdural hematoma (HCC)  LEFT FRONTAL TEMPORAL HEMATOMA  Procedure(s) (LRB):  LEFT FRONTAL TEMPORAL CRANIOTOMY FOR SUBDURAL HEMATOMA (Left) 1 Day Post-Op   Precautions:   Fall, Seizure    ASSESSMENT :  Based on the objective data described below, the patient presents with RUE weakness >distally with FM tasks, impaired RUE coordination, impaired RUE sensation, aphasia (expressive>receptive), flat affect, impaired standing balance and tolerance, cognition (attention, complex problem-solving), mild headache, s/p emergent left F/T craniotomy for SDH POD 1. Pt wife present at bedside. Pt alert, oriented x4. With time, pt is able to express simple wants/needs. ADLs overall min-modA, SBA bed mobility and CGA-Otoniel for sit<>stand and functional mobility in prep for ADL tasks. Pt with difficulty with FM grasp for writing/feeding utensils; provided built-up  for fork and pt able to perform food to mouth with supervision to Otoniel. Pt left in chair at end of session, educated pt and wife on neuroplasticity principles, importance of use it or lose it, and sensory activities.  At this time, feel pt would benefit from inpatient rehab at discharge as pt was IND and working full time; he can tolerate 3 hours PT/OT/SLP daily. Patient will benefit from skilled intervention to address the above impairments. Patients rehabilitation potential is considered to be Good  Factors which may influence rehabilitation potential include:   []             None noted  []             Mental ability/status  [x]             Medical condition  []             Home/family situation and support systems  []             Safety awareness  []             Pain tolerance/management  []             Other:      PLAN :  Recommendations and Planned Interventions:  [x]               Self Care Training                  [x]        Therapeutic Activities  [x]               Functional Mobility Training    [x]        Cognitive Retraining  [x]               Therapeutic Exercises           [x]        Endurance Activities  [x]               Balance Training                   [x]        Neuromuscular Re-Education  [x]               Visual/Perceptual Training     [x]   Home Safety Training  [x]               Patient Education                 [x]        Family Training/Education  []               Other (comment):    Frequency/Duration: Patient will be followed by occupational therapy 5 times a week to address goals. Discharge Recommendations: Inpatient Rehab  Further Equipment Recommendations for Discharge: TBD at rehab     SUBJECTIVE:   Patient stated I am numb.     OBJECTIVE DATA SUMMARY:   HISTORY:   History reviewed. No pertinent past medical history. History reviewed. No pertinent surgical history. Prior Level of Function/Environment/Context: pt lives with wife, IND at baseline. 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    Occupations in which the patient is/was successful, what are the barriers preventing that success:   Performance Patterns (routines, roles, habits, and rituals):   Personal Interests and/or values:   Expanded or extensive additional review of patient history:     Home Situation  Home Environment: Private residence  # Steps to Enter: 3  Rails to Enter: Yes  [employer] : Right  One/Two Story Residence: Two story, live on 1st floor  Living Alone: No  Support Systems: Spouse/Significant Other/Partner, Family member(s)  Patient Expects to be Discharged to[de-identified] Private residence  Current DME Used/Available at Home: None  Tub or Shower Type: Tub/Shower combination    Hand dominance: Right    EXAMINATION OF PERFORMANCE DEFICITS:  Cognitive/Behavioral Status:  Neurologic State: Alert  Orientation Level: Oriented X4  Cognition: Follows commands;Decreased attention/concentration  Perception: Cues to attend to right side of body;Verbal  Perseveration: No perseveration noted  Safety/Judgement: Awareness of environment    Skin: please see nursing notes    Edema: none noted in BUEs    Hearing: Auditory  Auditory Impairment: None    Vision/Perceptual:    Tracking: Able to track stimulus in all quadrants w/o difficulty                 Diplopia: No    Acuity: Able to read clock/calendar on wall without difficulty; Able to read employee name badge without difficulty         Range of Motion:    AROM: Within functional limits  PROM: Within functional limits                      Strength:    Strength: Generally decreased, functional (RUE >distally)                Coordination:  Coordination: Grossly decreased, non-functional (RUE >distally)  Fine Motor Skills-Upper: Left Intact; Right Impaired    Gross Motor Skills-Upper: Left Intact; Right Impaired    Tone & Sensation:    Tone: Normal  Sensation: Impaired (c/o numbness; can detect light touch)                      Balance:  Sitting: Intact  Standing: Impaired; With support  Standing - Static: Good;Constant support  Standing - Dynamic : Fair    Functional Mobility and Transfers for ADLs:  Bed Mobility:  Supine to Sit: Stand-by assistance  Scooting: Stand-by assistance    Transfers:  Sit to Stand: Contact guard assistance  Stand to Sit: Contact guard assistance  Toilet Transfer : Contact guard assistance; Adaptive equipment; Additional time (inferred)    ADL Assessment:  Feeding: Moderate assistance (d/t RUE strength/coordination/AROM/sensation)    Oral Facial Hygiene/Grooming: Moderate assistance (d/t RUE strength/coordination/AROM/sensation)    Bathing: Moderate assistance; Adaptive equipment; Additional time;Assist x1    Upper Body Dressing: Moderate assistance; Adaptive equipment; Additional time;Assist x1 (d/t RUE strength/coordination/AROM/sensation)    Lower Body Dressing: Moderate assistance (d/t RUE strength/coordination/AROM/sensation)    Toileting: Moderate assistance (d/t RUE strength/coordination/AROM/sensation)                ADL Intervention and task modifications:  Feeding  Cutting Food: Moderate assistance  Utensil Management: Moderate assistance (built-up utensil)  Food to Mouth: Minimum assistance  Cues: Tactile cues provided;Verbal cues provided;Visual cues provided  Adaptive Equipment: Built up fork                        Lower Body Dressing Assistance  Socks: Moderate assistance         Cognitive Retraining  Safety/Judgement: Awareness of environment     Functional Measure:  Barthel Index:    Bathin  Bladder: 10  Bowels: 10  Groomin  Dressin  Feedin  Mobility: 0  Stairs: 0  Toilet Use: 5  Transfer (Bed to Chair and Back): 10  Total: 45       Barthel and G-code impairment scale:  Percentage of impairment CH  0% CI  1-19% CJ  20-39% CK  40-59% CL  60-79% CM  80-99% CN  100%   Barthel Score 0-100 100 99-80 79-60 59-40 20-39 1-19   0   Barthel Score 0-20 20 17-19 13-16 9-12 5-8 1-4 0      The Barthel ADL Index: Guidelines  1. The index should be used as a record of what a patient does, not as a record of what a patient could do. 2. The main aim is to establish degree of independence from any help, physical or verbal, however minor and for whatever reason. 3. The need for supervision renders the patient not independent.   4. A patient's performance should be established using the best available evidence. Asking the patient, friends/relatives and nurses are the usual sources, but direct observation and common sense are also important. However direct testing is not needed. 5. Usually the patient's performance over the preceding 24-48 hours is important, but occasionally longer periods will be relevant. 6. Middle categories imply that the patient supplies over 50 per cent of the effort. 7. Use of aids to be independent is allowed. Hailey Hernandez., Barthel, D.W. (5853). Functional evaluation: the Barthel Index. [address] [facility] (14)2. Malou Lan zaina EFREN Raymundo, Faith Marrufo., Dara Haynes., Edgar, [address] [facility] (1999). Measuring the change indisability after inpatient rehabilitation; comparison of the responsiveness of the Barthel Index and Functional [place] Measure. Journal of Neurology, Neurosurgery, and Psychiatry, 66(4), 999-712. Clyde Hayden NTHEO.TOSIN, DELANEY Henry, & Kieran Gonzales M.A. (2004.) Assessment of post-stroke quality of life in cost-effectiveness studies: The usefulness of the Barthel Index and the EuroQoL-5D. Quality of Life Research, 13, 364-24         G codes: In compliance with CMSs Claims Based Outcome Reporting, the following G-code set was chosen for this patient based on their primary functional limitation being treated: The outcome measure chosen to determine the severity of the functional limitation was the Barthel Index with a score of 45/100 which was correlated with the impairment scale. ?  Self Care:     - CURRENT STATUS: CK - 40%-59% impaired, limited or restricted    - GOAL STATUS: CI - 1%-19% impaired, limited or restricted    - D/C STATUS:  ---------------To be determined---------------     Occupational Therapy Evaluation Charge Determination   History Examination Decision-Making   LOW Complexity : Brief history review  HIGH Complexity : 5 or more performance deficits relating to physical, cognitive , or psychosocial skils that result in activity limitations and / or participation restrictions HIGH Complexity : Patient presents with comorbidities that affect occupational performance. Signifigant modification of tasks or assistance (eg, physical or verbal) with assessment (s) is necessary to enable patient to complete evaluation       Based on the above components, the patient evaluation is determined to be of the following complexity level: LOW   Activity Tolerance:   VSS    Please refer to the flowsheet for vital signs taken during this treatment. After treatment:   [x] Patient left in no apparent distress sitting up in chair  [] Patient left in no apparent distress in bed  [x] Call bell left within reach  [x] Nursing notified  [x] Caregiver present  [] Bed alarm activated    COMMUNICATION/EDUCATION:   The patients plan of care was discussed with: Physical Therapist and Registered Nurse. [x] Home safety education was provided and the patient/caregiver indicated understanding. [x] Patient/family have participated as able in goal setting and plan of care. [x] Patient/family agree to work toward stated goals and plan of care. [] Patient understands intent and goals of therapy, but is neutral about his/her participation. [] Patient is unable to participate in goal setting and plan of care. This patients plan of care is appropriate for delegation to \[facility]\"".     Thank you for this referral.  Winter Rodriguez OT  Time Calculation: 36 mins

## 2018-07-04 NOTE — PROGRESS NOTES
Speech Pathology bedside swallow evaluation/discharge  Patient: Jill Hamman (1 y.o. male)  Date: 7/4/2018  Primary Diagnosis: Hemorragic stroke  Subdural hematoma (HCC)  LEFT FRONTAL TEMPORAL HEMATOMA  Procedure(s) (LRB):  LEFT FRONTAL TEMPORAL CRANIOTOMY FOR SUBDURAL HEMATOMA (Left) 1 Day Post-Op   Precautions:        ASSESSMENT :  SLP re-consulted after left fronto-temporal craniotomy with evacuation of subdural hematoma. Based on the objective data described below, the patient presents with continued intact oropharyngeal swallow. Patient with timely/complete mastication, timely swallow initiation, functional hyolaryngeal elevation/excursion, and no overt s/s aspiration observed. Skilled therapy provided by a speech-language pathologist for swallowing is not indicated at this time. Language treatment also completed this date. See separate note. PLAN :  Recommendations:  --Regular/thin liquid diet  --Straws ok  --Meds as tolerated  Discharge Recommendations: Inpatient Rehab     SUBJECTIVE:   Patient stated I'm thirsty.     OBJECTIVE:   History reviewed. No pertinent past medical history. History reviewed. No pertinent surgical history.   Prior Level of Function/Home Situation:   Home Situation  Home Environment: Private residence  # Steps to Enter: 3  One/Two Story Residence: Two story, live on 1st floor  Living Alone: No  Support Systems: Family member(s)  Patient Expects to be Discharged to[de-identified] Private residence  Current DME Used/Available at Home: None  Diet prior to admission: regular/thin  Current Diet:  Clear liquid   Cognitive and Communication Status:  Neurologic State: Alert  Orientation Level: Oriented to person, Oriented to time, Oriented to place  Cognition: Decreased command following  Perception: Appears intact  Perseveration: No perseveration noted  Safety/Judgement: Awareness of environment  Oral Assessment:  Oral Assessment  Labial: No impairment  Dentition: Natural;Full  Oral Hygiene: moist oral mucosa  Lingual: No impairment  Velum: No impairment  Mandible: No impairment  P.O. Trials:  Patient Position: upright in bed  Vocal quality prior to P.O.: No impairment  Consistency Presented: Ice chips;Puree; Thin liquid; Solid  How Presented: Self-fed/presented;SLP-fed/presented;Cup/sip;Cup/gulp; Spoon;Straw;Successive swallows     Bolus Acceptance: No impairment  Bolus Formation/Control: No impairment     Propulsion: No impairment  Oral Residue: None  Initiation of Swallow: No impairment  Laryngeal Elevation: Functional  Aspiration Signs/Symptoms: None  Pharyngeal Phase Characteristics: No impairment, issues, or problems              Oral Phase Severity: No impairment  Pharyngeal Phase Severity : No impairment  NOMS:   The NOMS functional outcome measure was used to quantify this patient's level of swallowing impairment. Based on the NOMS, the patient was determined to be at level 7 for swallow function         NOMS Swallowing Levels:  Level 1 (CN): NPO  Level 2 (CM): NPO but takes consistency in therapy  Level 3 (CL): Takes less than 50% of nutrition p.o. and continues with nonoral feedings; and/or safe with mod cues; and/or max diet restriction  Level 4 (CK): Safe swallow but needs mod cues; and/or mod diet restriction; and/or still requires some nonoral feeding/supplements  Level 5 (CJ): Safe swallow with min diet restriction; and/or needs min cues  Level 6 (CI): Independent with p.o.; rare cues; usually self cues; may need to avoid some foods or needs extra time  Level 7 ([address] [place] [address]): Independent for all p.o.  JERRY. (2003). National Outcomes Measurement System (NOMS): Adult Speech-Language Pathology User's Guide.        Pain:  Pain Scale 1: Numeric (0 - 10)  Pain Intensity 1: 0  Pain Location 1: Head  After treatment:   [] Patient left in no apparent distress sitting up in chair  [x] Patient left in no apparent distress in bed  [x] Call bell left within reach  [x] Nursing notified  [x] Caregiver present  [] Bed alarm activated    COMMUNICATION/EDUCATION:   The patients plan of care including findings, recommendations, and recommended diet changes were discussed with: Registered Nurse. Patient was educated regarding His deficit(s) of functional swallow as this relates to His diagnosis of SDH s/p craniotomy. He demonstrated Fair understanding as evidenced by aphasia. [x] Patient/family have participated as able and agree with findings and recommendations. [] Patient is unable to participate in plan of care at this time.     Thank you for this referral.  Shannon Jaimes, SLP  Time Calculation: 30 mins

## 2018-07-04 NOTE — PERIOP NOTES
TRANSFER - OUT REPORT:    Verbal report given to ICU RN on Elpidio Box  being transferred to ICU 20(unit) for routine post - op       Report consisted of patients Situation, Background, Assessment and   Recommendations(SBAR). Time Pre op antibiotic given:2 GRAMS ANCEF 2052   Anesthesia Stop time: 2320   Culp Present on Transfer to floor:YES   Order for Culp on Chart:YES  Discharge Prescriptions with Chart:NO    Information from the following report(s) SBAR, OR Summary, Procedure Summary, Intake/Output, MAR, Recent Results and Cardiac Rhythm NSR was reviewed with the receiving nurse. Opportunity for questions and clarification was provided. Is the patient on 02? YES       L/Min 2       Other     Is the patient on a monitor? YES    Is the nurse transporting with the patient? YESX2    Surgical Waiting Area notified of patient's transfer from PACU? YES      The following personal items collected during your admission accompanied patient upon transfer:   Dental Appliance: Dental Appliances: None  Vision: Visual Aid: None  Hearing Aid:    Jewelry: Jewelry: Ring (wedding band taped)  Clothing: Clothing:  (no belongings to preop)  Other Valuables:  Other Valuables: None  Valuables sent to safe:      WEDDING BAND REMAINS TAPED  NO OTHER BELONGINGS

## 2018-07-04 NOTE — PROGRESS NOTES
Problem: Falls - Risk of  Goal: *Absence of Falls  Document Siacc Fall Risk and appropriate interventions in the flowsheet. Outcome: Progressing Towards Goal  Fall Risk Interventions:  Mobility Interventions: Communicate number of staff needed for ambulation/transfer, Assess mobility with egress test, OT consult for ADLs, Patient to call before getting OOB, Strengthening exercises (ROM-active/passive), PT Consult for mobility concerns, PT Consult for assist device competence, Utilize walker, cane, or other assitive device    Mentation Interventions: Adequate sleep, hydration, pain control, Door open when patient unattended, Evaluate medications/consider consulting pharmacy, Family/sitter at bedside, Increase mobility, More frequent rounding, Reorient patient, Room close to nurse's station, Update white board    Medication Interventions: Evaluate medications/consider consulting pharmacy, Assess postural VS orthostatic hypotension, Teach patient to arise slowly    Elimination Interventions: Patient to call for help with toileting needs, Toileting schedule/hourly rounds, Call light in reach    History of Falls Interventions: Consult care management for discharge planning, Evaluate medications/consider consulting pharmacy, Investigate reason for fall, Room close to nurse's station, Door open when patient unattended        Problem: Pressure Injury - Risk of  Goal: *Prevention of pressure injury  Document Erik Scale and appropriate interventions in the flowsheet.    Outcome: Progressing Towards Goal  Pressure Injury Interventions:  Sensory Interventions: Assess changes in LOC, Assess need for specialty bed, Check visual cues for pain, Discuss PT/OT consult with provider, Keep linens dry and wrinkle-free, Minimize linen layers, Maintain/enhance activity level, Use 30-degree side-lying position, Pressure redistribution bed/mattress (bed type)         Activity Interventions: Assess need for specialty bed, Pressure redistribution bed/mattress(bed type), PT/OT evaluation    Mobility Interventions: Assess need for specialty bed, Pressure redistribution bed/mattress (bed type), PT/OT evaluation, Turn and reposition approx.  every two hours(pillow and wedges)    Nutrition Interventions: Document food/fluid/supplement intake, Discuss nutritional consult with provider, Offer support with meals,snacks and hydration    Friction and Shear Interventions: Lift sheet, Minimize layers               Problem: Craniotomy: Day of Surgery  Goal: Medications  Outcome: Progressing Towards Goal  Cardene gtt  Goal: Respiratory  Outcome: Progressing Towards Goal  RA

## 2018-07-04 NOTE — BRIEF OP NOTE
BRIEF OPERATIVE NOTE    Date of Procedure: 7/3/2018   Preoperative Diagnosis: LEFT FRONTAL TEMPORAL HEMATOMA  Postoperative Diagnosis: LEFT FRONTAL TEMPORAL HEMATOMA    Procedure(s):  LEFT FRONTAL TEMPORAL CRANIOTOMY FOR SUBDURAL HEMATOMA  Surgeon(s) and Role:     * Norm Horvath MD - Primary         Surgical Assistant: gary    Surgical Staff:  Circ-1: Hadley Whitmore RN  Circ-Relief: Negrita Pan  Scrub RN-1: Cary Riddle RN  Surg Asst-1: Tr Landeros  Event Time In   Incision Start 2100   Incision Close      Anesthesia: General   Estimated Blood Loss: 100  Specimens: * No specimens in log *   Findings: sdh   Complications: no  Implants:   Implant Name Type Inv.  Item Serial No.  Lot No. LRB No. Used Action   COVER BUR H LG 0.5X18.5MM TI --  - SN/A  COVER BUR H LG 0.5X18.5MM TI --  N/A [employer] N/A Left 4 Implanted   SCR BNE XDRV HI TORQ 1.5X4MM --  - SN/A  SCR BNE XDRV HI TORQ 1.5X4MM --  N/A [employer] N/A Left 8 Implanted   SCR BNE ST HI TORQ 1.5X3.5 --  - SN/A  SCR BNE ST HI TORQ 1.5X3.5 --  N/A [employer] N/A Left 3 Implanted   SCR BNE HI TORQ XDRV 1.5X4MM --  - SN/A   SCR BNE HI TORQ XDRV 1.5X4MM --  N/A [employer] N/A Left 1 Implanted

## 2018-07-04 NOTE — PROGRESS NOTES
In response to coding query:  Patient is being treated for :     => Cerebral edema and brain compression in the setting of SDH requiring monitoring in ICU setting

## 2018-07-04 NOTE — ANESTHESIA POSTPROCEDURE EVALUATION
Post-Anesthesia Evaluation and Assessment    Patient: Elpidio Box MRN: [id number]  SSN: [id number]    YOB: 1965  Age: 47 y.o. Sex: male       Cardiovascular Function/Vital Signs  Visit Vitals    /49    Pulse 75    Temp 36.4 °C (97.5 °F)    Resp 16    Ht 6' 3\" (1.905 m)    Wt 92.2 kg (203 lb 4.2 oz)    SpO2 95%    BMI 25.41 kg/m2       Patient is status post general anesthesia for Procedure(s):  LEFT FRONTAL TEMPORAL CRANIOTOMY FOR SUBDURAL HEMATOMA. Nausea/Vomiting: None    Postoperative hydration reviewed and adequate. Pain:  Pain Scale 1: FLACC (07/03/18 2350)  Pain Intensity 1: 0 (07/03/18 2350)   Managed    Neurological Status:   Neuro (WDL): Exceptions to WDL (07/03/18 2330)  Neuro  Neurologic State: Eyes open to voice (07/03/18 2330)  Orientation Level: Unable to verbalize (07/03/18 2330)  Cognition: Follows commands (07/03/18 2330)  Speech: Aphasic (comment) (07/03/18 2330)  Assessment L Pupil: Brisk;Round (07/03/18 2330)  Size L Pupil (mm): 3 (07/03/18 1600)  Assessment R Pupil: Brisk;Round (07/03/18 2330)  Size R Pupil (mm): 3 (07/03/18 1600)  LUE Motor Response: Purposeful;Spontaneous  (07/03/18 2330)  LLE Motor Response: Purposeful;Spontaneous  (07/03/18 2330)  RUE Motor Response: Purposeful;Spontaneous  (07/03/18 2330)  RLE Motor Response: Purposeful;Spontaneous  (07/03/18 2330)   At baseline    Mental Status and Level of Consciousness: Arousable    Pulmonary Status:   O2 Device: CO2 nasal cannula (07/03/18 2319)   Adequate oxygenation and airway patent    Complications related to anesthesia: None    Post-anesthesia assessment completed.  No concerns    Signed By: Toby Chase MD     July 3, 2018

## 2018-07-04 NOTE — PERIOP NOTES
Patient: Joan aMher MRN: [id number]  SSN: [id number]   YOB: 1965  Age: 47 y.o. Sex: male     Patient is status post Procedure(s):  LEFT FRONTAL TEMPORAL CRANIOTOMY FOR SUBDURAL HEMATOMA. Surgeon(s) and Role:     * Dulce Scott MD - Primary    Local/Dose/Irrigation:  SEE EMAR                   Peripheral IV 07/01/18 Left Antecubital (Active)   Site Assessment Clean, dry, & intact 7/3/2018  4:00 PM   Phlebitis Assessment 0 7/3/2018  4:00 PM   Infiltration Assessment 0 7/3/2018  4:00 PM   Dressing Status Clean, dry, & intact 7/3/2018  4:00 PM   Dressing Type Transparent;Tape 7/3/2018  4:00 PM   Hub Color/Line Status Pink; Infusing 7/3/2018  4:00 PM   Action Taken Open ports on tubing capped 7/3/2018  4:00 PM   Alcohol Cap Used Yes 7/3/2018  4:00 PM       Peripheral IV 07/03/18 Left Forearm (Active)   Site Assessment Clean, dry, & intact 7/3/2018  5:19 PM   Phlebitis Assessment 0 7/3/2018  5:19 PM   Infiltration Assessment 0 7/3/2018  5:19 PM   Dressing Status New;Clean, dry, & intact 7/3/2018  5:19 PM   Dressing Type Transparent;Tape 7/3/2018  5:19 PM   Hub Color/Line Status Blue;Capped;Flushed;Patent 7/3/2018  5:19 PM   Action Taken Open ports on tubing capped 7/3/2018  5:19 PM   Alcohol Cap Used Yes 7/3/2018  5:19 PM       Peripheral IV 07/03/18 Right Hand (Active)       Peripheral IV 07/03/18 Left Hand (Active)      Arterial Line 07/03/18 Right Radial artery (Active)          Airway - Endotracheal Tube 07/03/18 Oral (Active)                   Dressing/Packing:  Wound Head Left-DRESSING TYPE: 4 x 4;Fluffs;Gauze wrap (adriana); [place] (07/03/18 2100)  Splint/Cast:  ]    Other:

## 2018-07-04 NOTE — PROGRESS NOTES
Problem: Mobility Impaired (Adult and Pediatric)  Goal: *Acute Goals and Plan of Care (Insert Text)  Physical Therapy Goals  Initiated 7/4/2018  1. Patient will move from supine to sit and sit to supine , scoot up and down and roll side to side in bed with independence within 7 day(s). 2.  Patient will transfer from bed to chair and chair to bed with supervision/set-up using the least restrictive device within 7 day(s). 3.  Patient will perform sit to stand with supervision/set-up within 7 day(s). 4.  Patient will ambulate with minimal assistance/contact guard assist for 100 feet with the least restrictive device within 7 day(s). 5.  Patient will ascend/descend 3 stairs with 1 handrail(s) with supervision/set-up within 7 day(s). 6.  Patient will improve Lorenzana Balance score by 7 points within 7 days. physical Therapy EVALUATION- neuro population    Patient: Zaria Lynch (87 y.o. male)  Date: 7/4/2018  Primary Diagnosis: Hemorragic stroke  Subdural hematoma (HCC)  LEFT FRONTAL TEMPORAL HEMATOMA  Procedure(s) (LRB):  LEFT FRONTAL TEMPORAL CRANIOTOMY FOR SUBDURAL HEMATOMA (Left) 1 Day Post-Op   Precautions:   Fall, Seizure    ASSESSMENT :  Based on the objective data described below, the patient presents with decreased independence with mobility compared to his baseline when well limited by delayed processing, aphasia, R UE weakness and numbness, decreased coordination, impaired cognition, HA, impaired balance, and questionable safety awareness. A&O x 4. Wife present to conform patient home set up and history. Patient overall CGA-Min A for bed mobility, transfers, and short gait distance. Seated in chair at end of session. Provided education on acute care expectations and introduced rehab as a discharge plan. All questions answered. Will continue to follow and highly recommend inpatient rehab at d/c. Patient will benefit from skilled intervention to address the above impairments.   Patients rehabilitation potential is considered to be Good  Factors which may influence rehabilitation potential include:   []           None noted  [x]           Mental ability/status  [x]           Medical condition  []           Home/family situation and support systems  []           Safety awareness  []           Pain tolerance/management  []           Other:      PLAN :  Recommendations and Planned Interventions:  []             Bed Mobility Training             []      Neuromuscular Re-Education  []             Transfer Training                   []      Orthotic/Prosthetic Training  []             Gait Training                         []      Modalities  []             Therapeutic Exercises           []      Edema Management/Control  []             Therapeutic Activities            []      Patient and Family Training/Education  []             Other (comment):  Frequency/Duration: Patient will be followed by physical therapy 5 times a week to address goals. Discharge Recommendations: Inpatient Rehab  Further Equipment Recommendations for Discharge: tbd     SUBJECTIVE:   Patient stated I am aware.     OBJECTIVE DATA SUMMARY:   HISTORY:    History reviewed. No pertinent past medical history. History reviewed. No pertinent surgical history.   Prior Level of Function/Home Situation: independent   Personal factors and/or comorbidities impacting plan of care: impaired coordination    Home Situation  Home Environment: Private residence  # Steps to Enter: 3  Rails to Enter: Yes  Hand Rails : Right  One/Two Story Residence: Two story, live on 1st floor  Living Alone: No  Support Systems: Spouse/Significant Other/Partner, Family member(s)  Patient Expects to be Discharged to[de-identified] Private residence  Current DME Used/Available at Home: None  Tub or Shower Type: Tub/Shower combination    EXAMINATION/PRESENTATION/DECISION MAKING:   Critical Behavior:  Neurologic State: Alert  Orientation Level: Oriented X4  Cognition: Follows commands, Decreased attention/concentration  Safety/Judgement: Awareness of environment  Hearing: Auditory  Auditory Impairment: None    Range Of Motion:  AROM: Within functional limits           PROM: Within functional limits           Strength:    Strength: Generally decreased, functional (RUE >distally)                    Tone & Sensation:   Tone: Normal              Sensation: Impaired (c/o numbness; can detect light touch)               Coordination:  Coordination: Grossly decreased, non-functional (RUE >distally)  Vision:   Tracking: Able to track stimulus in all quadrants w/o difficulty  Diplopia: No  Acuity: Able to read clock/calendar on wall without difficulty; Able to read employee name badge without difficulty  Functional Mobility:  Bed Mobility:     Supine to Sit: Stand-by assistance     Scooting: Stand-by assistance  Transfers:  Sit to Stand: Contact guard assistance  Stand to Sit: Contact guard assistance                       Balance:   Sitting: Intact  Standing: Impaired; With support  Standing - Static: Good;Constant support  Standing - Dynamic : Fair  Ambulation/Gait Training:  Distance (ft): 10 Feet (ft)           Gait Description (WDL): Exceptions to WDL  Gait Abnormalities: Decreased step clearance; Altered arm swing;Trunk sway increased              Speed/Zelda: Shuffled  Step Length: Right shortened;Left shortened                           Functional Measure  Lorenzana Balance Test:    Sitting to Standin  Standing Unsupported: 3  Sitting with Back Unsupported: 4  Standing to Sitting: 3  Transfers: 1  Standing Unsupported with Eyes Closed: 0  Standing Unsupported with Feet Together: 0  Reach Forward with Outstretched Arm: 0   Object: 0  Turn to Look Over Shoulders: 0  Turn 360 Degrees: 0  Alternate Foot on Step/Stool: 0  Standing Unsupported One Foot in Front: 0  Stand on One Le  Total: 12         56=Maximum possible score;   0-20=High fall risk  21-40=Moderate fall risk   41-56=Low fall risk Lorenzana Balance Test and G-code impairment scale:  Percentage of Impairment CH    0%   CI    1-19% CJ    20-39% CK    40-59% CL    60-79% CM    80-99% CN     100%   Lorenzana   Score 0-56 56 45-55 34-44 23-33 12-22 1-11 0       G codes: In compliance with CMSs Claims Based Outcome Reporting, the following G-code set was chosen for this patient based on their primary functional limitation being treated: The outcome measure chosen to determine the severity of the functional limitation was the Karlynn Neve with a score of 12/56 which was correlated with the impairment scale. ? Mobility - Walking and Moving Around:     - CURRENT STATUS: CL - 60%-79% impaired, limited or restricted    - GOAL STATUS: CK - 40%-59% impaired, limited or restricted    - D/C STATUS:  ---------------To be determined---------------     Physical Therapy Evaluation Charge Determination   History Examination Presentation Decision-Making   HIGH Complexity :3+ comorbidities / personal factors will impact the outcome/ POC  LOW Complexity : 1-2 Standardized tests and measures addressing body structure, function, activity limitation and / or participation in recreation  MEDIUM Complexity : Evolving with changing characteristics  LOW Complexity : FOTO score of       Based on the above components, the patient evaluation is determined to be of the following complexity level: LOW       Pain:  Pain Scale 1: Numeric (0 - 10)  Pain Intensity 1: 0  Pain Location 1: Head  Pain Orientation 1: Anterior; Lateral  Pain Description 1: Aching  Pain Intervention(s) 1: Medication (see MAR)  Activity Tolerance:   VSS  Please refer to the flowsheet for vital signs taken during this treatment.   After treatment:   [x]     Patient left in no apparent distress sitting up in chair  []     Patient left in no apparent distress in bed  [x]     Call bell left within reach  [x]     Nursing notified  [x]     Caregiver present  []     Bed alarm activated    COMMUNICATION/EDUCATION:   The patients plan of care was discussed with: Registered Nurse. [x]  Fall prevention education was provided and the patient/caregiver indicated understanding. [x]  Patient/family have participated as able in goal setting and plan of care. [x]  Patient/family agree to work toward stated goals and plan of care. []  Patient understands intent and goals of therapy, but is neutral about his/her participation. []  Patient is unable to participate in goal setting and plan of care.     Thank you for this referral.  Rick Jameson, PT , DPT   Time Calculation: 33 mins

## 2018-07-04 NOTE — PROGRESS NOTES
Problem: Communication Impaired (Adult)  Goal: *Speech Goal: (INSERT TEXT)  Speech Therapy Goals  Re-evaluation 7/4/2018 s/p craniotomy  1. Patient will follow 2 step commands with 80% accuracy given min cues within 7 days  2. Patient will complete open ended and verb sentence completion tasks with 80% accuracy given min cues within 7 days  3. Patient will complete moderate-complex responsive naming tasks with 80% accuracy given min cues within 7 days    Initiated 7/3/2018  Patient will, within 7 days:  1. Follow 2 step commands 50%. Continue  2. Answer yes/no simple questions 70%. MET  3. Complete automatic sentences 75%. MET  Speech language pathology treatment: WEEKLY REASSESSMENT  Patient: Bernice Santana (62 y.o. male)  Date: 7/4/2018  Diagnosis: Hemorragic stroke  Subdural hematoma (HCC)  LEFT FRONTAL TEMPORAL HEMATOMA Subdural hematoma (HCC)  Procedure(s) (LRB):  LEFT FRONTAL TEMPORAL CRANIOTOMY FOR SUBDURAL HEMATOMA (Left) 1 Day Post-Op    ASSESSMENT:  Patient re-evaluated s/p left fronto-temporal craniotomy with evacuation of subdural hematoma. Patient with significantly improved language s/p craniotomy, however patient continues with mild-moderate receptive aphasia characterized by decreased 2 step command following. Patient benefits from repetition, slowed speech, stressed words, and visual cues. Patient also with mild-moderate expressive aphasia characterized by nonfluent speech with word retrieval deficits and decreased content. Patient benefits from verbal and phonemic cues. Patient with improving utterance length with phrases in conversation. Recommend IP rehab to improve functional communication. Patient's progression toward goals since last assessment: Excellent. Patient with significantly improved language s/p craniotomy, however patient continues with mild-moderate receptive/expressive aphasia. Patient would benefit from intensive SLP treatment to improve functional communication. PLAN:  Goals have been updated based on progression since last assessment. Patient continues to benefit from skilled intervention to address the above impairments. Continue to follow the patient 4 times a week to address goals. Recommendations and Planned Interventions:  --SLP treatment to improve functional communication  Discharge Recommendations: Inpatient Rehab     SUBJECTIVE:   Patient stated GBA. ... Head?     OBJECTIVE:   Mental Status:  Neurologic State: Alert  Orientation Level: Oriented to person, Oriented to time, Oriented to place  Cognition: Decreased command following  Perception: Appears intact  Perseveration: No perseveration noted  Safety/Judgement: Awareness of environment  Treatment & Interventions:     Language Comprehension and Expression:  Auditory Comprehension   Auditory Impairment: Yes  Response to Basic Yes/No Questions (%): 100 %  Response to Complex Yes/No Questions (%) : 90 %  Two-Step Basic Commands (%): 20 % (no cues, 80% verbal and visual cues)  Verbal Expression   moderate yes/no questions: 80%                     Naming: Impaired  Confrontation (%): 100 %                 Sentence Completion: Impaired  Word level (%): 33 % (open ended nouns)              Pain:  Pain Scale 1: Numeric (0 - 10)  Pain Intensity 1: 0  Pain Location 1: Head  After treatment:   []         Patient left in no apparent distress sitting up in chair  [x]         Patient left in no apparent distress in bed  [x]         Call bell left within reach  [x]         Nursing notified  [x]         Caregiver present  []         Bed alarm activated    COMMUNICATION/COLLABORATION:   Patient was educated regarding His deficit(s) of aphasia as this relates to His diagnosis of s/p craniotomy. He demonstrated Fair understanding as evidenced by aphasia.     The patients plan of care was discussed with: Registered Nurse    Edith Leventhal, SLP  Time Calculation: 30 mins

## 2018-07-04 NOTE — PROGRESS NOTES
0730 received verbal bedside report from [address] using SBAR- alarms and drip checked  0800 Dr Sims Head in - new orders  0840 bp med given as ordered  5064 systolic bp up -andpt has a headache of 2- meds given for both( see mar)  1030 martines and fabian removed and cardene drip is off  1115 speech therapist in   [facility] Dr Raina Padilla in   [phone] and PT working with pt up in chair   1400 back to bed - still some expressive aphasia- ate lunch well -back to bed with assistance- voided prior to going back to bed  1540 Dr Ricki Ledezma in  1630 up to chair for dinner- medication given for his bp  1800 back to bed -pain med given - scd's back on   1830 shift summary: martines and fabian dc'd and cardene off- pt has required prn meds for bp - tylenol for his headache- has voided and is able to eat without issue - still has some lingering expressive aphasia- receptive aphasia is rare now- up in chair x 2 today  Shift report given to oncoming RN using sbar

## 2018-07-05 LAB
GLUCOSE BLD STRIP.AUTO-MCNC: 141 MG/DL (ref 65–100)
GLUCOSE BLD STRIP.AUTO-MCNC: 152 MG/DL (ref 65–100)
GLUCOSE BLD STRIP.AUTO-MCNC: 175 MG/DL (ref 65–100)
GLUCOSE BLD STRIP.AUTO-MCNC: 186 MG/DL (ref 65–100)
GLUCOSE BLD STRIP.AUTO-MCNC: 224 MG/DL (ref 65–100)
SERVICE CMNT-IMP: ABNORMAL

## 2018-07-05 PROCEDURE — 74011250636 HC RX REV CODE- 250/636: Performed by: NEUROLOGICAL SURGERY

## 2018-07-05 PROCEDURE — 74011636637 HC RX REV CODE- 636/637: Performed by: NEUROLOGICAL SURGERY

## 2018-07-05 PROCEDURE — 82962 GLUCOSE BLOOD TEST: CPT

## 2018-07-05 PROCEDURE — 74011250637 HC RX REV CODE- 250/637: Performed by: HOSPITALIST

## 2018-07-05 PROCEDURE — 74011000250 HC RX REV CODE- 250: Performed by: NURSE PRACTITIONER

## 2018-07-05 PROCEDURE — 74011250637 HC RX REV CODE- 250/637: Performed by: NEUROLOGICAL SURGERY

## 2018-07-05 PROCEDURE — 65660000000 HC RM CCU STEPDOWN

## 2018-07-05 PROCEDURE — 74011250637 HC RX REV CODE- 250/637: Performed by: NURSE PRACTITIONER

## 2018-07-05 PROCEDURE — 74011000258 HC RX REV CODE- 258: Performed by: NEUROLOGICAL SURGERY

## 2018-07-05 PROCEDURE — 74011250636 HC RX REV CODE- 250/636: Performed by: HOSPITALIST

## 2018-07-05 PROCEDURE — 77030002996 HC SUT SLK J&J -A

## 2018-07-05 PROCEDURE — 74011250636 HC RX REV CODE- 250/636: Performed by: NURSE PRACTITIONER

## 2018-07-05 RX ORDER — LABETALOL HYDROCHLORIDE 5 MG/ML
10 INJECTION, SOLUTION INTRAVENOUS
Status: DISCONTINUED | OUTPATIENT
Start: 2018-07-05 | End: 2018-07-06

## 2018-07-05 RX ORDER — HYDRALAZINE HYDROCHLORIDE 20 MG/ML
10 INJECTION INTRAMUSCULAR; INTRAVENOUS
Status: DISCONTINUED | OUTPATIENT
Start: 2018-07-05 | End: 2018-07-06

## 2018-07-05 RX ORDER — FAMOTIDINE 20 MG/1
20 TABLET, FILM COATED ORAL EVERY 12 HOURS
Status: DISCONTINUED | OUTPATIENT
Start: 2018-07-05 | End: 2018-07-11 | Stop reason: HOSPADM

## 2018-07-05 RX ORDER — LISINOPRIL 10 MG/1
10 TABLET ORAL DAILY
Status: DISCONTINUED | OUTPATIENT
Start: 2018-07-05 | End: 2018-07-06

## 2018-07-05 RX ORDER — LEVETIRACETAM 500 MG/1
1000 TABLET ORAL EVERY 12 HOURS
Status: DISCONTINUED | OUTPATIENT
Start: 2018-07-05 | End: 2018-07-11 | Stop reason: HOSPADM

## 2018-07-05 RX ADMIN — DEXAMETHASONE SODIUM PHOSPHATE 4 MG: 4 INJECTION, SOLUTION INTRAMUSCULAR; INTRAVENOUS at 15:09

## 2018-07-05 RX ADMIN — Medication 10 ML: at 06:16

## 2018-07-05 RX ADMIN — LABETALOL HYDROCHLORIDE 10 MG: 5 INJECTION, SOLUTION INTRAVENOUS at 06:06

## 2018-07-05 RX ADMIN — LEVETIRACETAM 1000 MG: 500 TABLET ORAL at 21:38

## 2018-07-05 RX ADMIN — HYDRALAZINE HYDROCHLORIDE 10 MG: 20 INJECTION INTRAMUSCULAR; INTRAVENOUS at 02:23

## 2018-07-05 RX ADMIN — ACETAMINOPHEN 650 MG: 325 TABLET ORAL at 10:43

## 2018-07-05 RX ADMIN — FAMOTIDINE 20 MG: 20 TABLET ORAL at 10:43

## 2018-07-05 RX ADMIN — HUMAN INSULIN 2 UNITS: 100 INJECTION, SOLUTION SUBCUTANEOUS at 12:25

## 2018-07-05 RX ADMIN — Medication 10 ML: at 15:09

## 2018-07-05 RX ADMIN — LABETALOL HYDROCHLORIDE 10 MG: 5 INJECTION, SOLUTION INTRAVENOUS at 00:21

## 2018-07-05 RX ADMIN — HUMAN INSULIN 2 UNITS: 100 INJECTION, SOLUTION SUBCUTANEOUS at 06:26

## 2018-07-05 RX ADMIN — HUMAN INSULIN 3 UNITS: 100 INJECTION, SOLUTION SUBCUTANEOUS at 18:02

## 2018-07-05 RX ADMIN — LABETALOL HYDROCHLORIDE 10 MG: 5 INJECTION, SOLUTION INTRAVENOUS at 11:28

## 2018-07-05 RX ADMIN — DEXAMETHASONE SODIUM PHOSPHATE 4 MG: 4 INJECTION, SOLUTION INTRAMUSCULAR; INTRAVENOUS at 06:13

## 2018-07-05 RX ADMIN — Medication 10 ML: at 21:38

## 2018-07-05 RX ADMIN — HUMAN INSULIN 2 UNITS: 100 INJECTION, SOLUTION SUBCUTANEOUS at 00:12

## 2018-07-05 RX ADMIN — LISINOPRIL 10 MG: 10 TABLET ORAL at 15:09

## 2018-07-05 RX ADMIN — ACETAMINOPHEN 650 MG: 325 TABLET ORAL at 06:06

## 2018-07-05 RX ADMIN — ACETAMINOPHEN 650 MG: 325 TABLET ORAL at 19:12

## 2018-07-05 RX ADMIN — ACETAMINOPHEN 650 MG: 325 TABLET ORAL at 15:13

## 2018-07-05 RX ADMIN — ACETAMINOPHEN 650 MG: 325 TABLET ORAL at 02:22

## 2018-07-05 RX ADMIN — HYDRALAZINE HYDROCHLORIDE 10 MG: 20 INJECTION INTRAMUSCULAR; INTRAVENOUS at 10:44

## 2018-07-05 RX ADMIN — LABETALOL HYDROCHLORIDE 10 MG: 5 INJECTION, SOLUTION INTRAVENOUS at 04:06

## 2018-07-05 RX ADMIN — FAMOTIDINE 20 MG: 20 TABLET ORAL at 20:01

## 2018-07-05 RX ADMIN — LABETALOL HYDROCHLORIDE 10 MG: 5 INJECTION, SOLUTION INTRAVENOUS at 22:33

## 2018-07-05 RX ADMIN — ACETAMINOPHEN 650 MG: 325 TABLET ORAL at 23:18

## 2018-07-05 RX ADMIN — SODIUM CHLORIDE 1000 MG: 900 INJECTION, SOLUTION INTRAVENOUS at 08:12

## 2018-07-05 RX ADMIN — DOCUSATE SODIUM 100 MG: 100 CAPSULE, LIQUID FILLED ORAL at 08:13

## 2018-07-05 RX ADMIN — HUMAN INSULIN 2 UNITS: 100 INJECTION, SOLUTION SUBCUTANEOUS at 23:18

## 2018-07-05 RX ADMIN — LABETALOL HYDROCHLORIDE 10 MG: 5 INJECTION, SOLUTION INTRAVENOUS at 06:45

## 2018-07-05 RX ADMIN — DOCUSATE SODIUM 100 MG: 100 CAPSULE, LIQUID FILLED ORAL at 17:57

## 2018-07-05 RX ADMIN — POTASSIUM CHLORIDE AND SODIUM CHLORIDE: 900; 150 INJECTION, SOLUTION INTRAVENOUS at 03:43

## 2018-07-05 RX ADMIN — HYDRALAZINE HYDROCHLORIDE 10 MG: 20 INJECTION INTRAMUSCULAR; INTRAVENOUS at 21:41

## 2018-07-05 RX ADMIN — LABETALOL HYDROCHLORIDE 10 MG: 5 INJECTION, SOLUTION INTRAVENOUS at 00:59

## 2018-07-05 RX ADMIN — LABETALOL HYDROCHLORIDE 10 MG: 5 INJECTION, SOLUTION INTRAVENOUS at 20:00

## 2018-07-05 RX ADMIN — AMLODIPINE BESYLATE 10 MG: 5 TABLET ORAL at 08:12

## 2018-07-05 NOTE — PROGRESS NOTES
Neurosurgery Progress Note  Carolyn Knox  [phone]        Admit Date: 2018   LOS: 4 days        Daily Progress Note: 2018    POD:2 Days Post-Op    S/P: Procedure(s):  LEFT FRONTAL TEMPORAL CRANIOTOMY FOR SUBDURAL HEMATOMA    Subjective: The patient was apparently diving down to around 12 feet last week when he developed a headache shortly thereafter. He also developed difficulty with his speech that lasted about 10 minutes and then another one lasting about 2-3 minutes. He presented to the ER at [facility] where a head CT revealed a 6 mm SDH with some mass effect. The patient was transferred to [facility] for neurosurgical evaluation. The patient was observed in the ICU initially as his wife was hoping to avoid surgery. Unfortunately, on Tuesday night, the patient became more lethargic with worsening expressive aphasia and right sided weakness. The patient was taken to the OR emergently for evacuation of his SDH by Dr. Purvi Seay. This morning, the patient is sitting up in the chair. His aphasia is improving and his right-side weakness is almost gone. He only complains of numbness on the right side in his hand and arm. Denies chest pain, leg pain, nausea, vomiting, difficulty swallowing, and dyspnea. Objective:     Vital signs  Temp (24hrs), Av.1 °F (36.7 °C), Min:97.5 °F (36.4 °C), Max:98.9 °F (37.2 °C)   701 - 1900  In: 440 [P.O.:240; I.V.:200]  Out: 250 [Urine:250]  1901 -  07  In: 6707.8 [P.O.:520;  I.V.:6187.8]  Out: 80 [Urine:3800; Drains:385]    Visit Vitals    /74    Pulse (!) 57    Temp 97.5 °F (36.4 °C)    Resp 17    Ht 6' 3\" (1.905 m)    Wt 99.3 kg (218 lb 14.4 oz)    SpO2 96%    BMI 27.36 kg/m2    O2 Flow Rate (L/min): 2 l/min O2 Device: Room air     Pain control  Pain Assessment  Pain Scale 1: Numeric (0 - 10)  Pain Intensity 1: 0  Pain Onset 1: on going/post op  Pain Location 1: Head  Pain Orientation 1: Anterior  Pain Description 1: Aching  Pain Intervention(s) 1: Repositioned    PT/OT  Gait     Gait  Speed/Zelda: Shuffled  Step Length: Right shortened, Left shortened  Gait Abnormalities: Decreased step clearance, Altered arm swing, Trunk sway increased  Distance (ft): 10 Feet (ft)           Physical Exam:  Gen:NAD. Neuro: A&Ox3. Follows commands. Speech mild to moderate expressive aphasia. Affect flat. PERRL. EOMI. Face symmetric. Palate Tongue midline. ESCOTO spontaneously. Strength 5/5 in LUE/LLE and 5-/5 LUE/LLE. Negative drift. Gait deferred. Skin: Dressing left scalp C/D/I    HVAC 90 cc of serosanguineous drainage overnight. CT head without contrast on 07/01/18 shows acute-subacute left subdural hematoma with mass effect including 6  mm contralateral subfalcine herniation. CT head without contrast on 07/04/18 shows the patient is status post left craniotomy with evacuation of an  acute left subdural hematoma. There is air in the left subdural space. There is residual effacement of the underlying left sulci. The lateral ventricles are now slightly larger and the left to right shift has decreased to 2 mm.     24 hour results:    Recent Results (from the past 24 hour(s))   GLUCOSE, POC    Collection Time: 07/04/18 12:10 PM   Result Value Ref Range    Glucose (POC) 145 (H) 65 - 100 mg/dL    Performed by [address] [place] [address], POC    Collection Time: 07/04/18  4:32 PM   Result Value Ref Range    Glucose (POC) 150 (H) 65 - 100 mg/dL    Performed by [address] [place] [address], POC    Collection Time: 07/05/18 12:01 AM   Result Value Ref Range    Glucose (POC) 141 (H) 65 - 100 mg/dL    Performed by Bautista Rai, POC    Collection Time: 07/05/18  6:16 AM   Result Value Ref Range    Glucose (POC) 152 (H) 65 - 100 mg/dL    Performed by Alexis Jack           Assessment:     Principal Problem:    Subdural hematoma ([address] (7/1/2018)        Plan:   1. Subdural hematoma   - s/p crani 07/03   - neuro checks   - decadron x 5 doses for brain irritation   - keppra for sz ppx   - pepcid for GI ppx   - PT/OT   - Will likely remove drain this afternoon and then patient can transfer to [facility]  2. Brain compression   - due to #1   - plans as above  3. Acute encephalopathy   - due to #1, 2   - plans as above  4. HTN   - SBP<150   - Cont Norvasc   - Hydralazine/labetalol PRN   - Hospitalist/intensivist following  5. Hyperglycemia, steroid-induced   - SSI and accu-checks   - Hospitalist following  6. Leukocytosis   - WBC 17.2   - Likely due to steroids and inflammation from surgery   - Afebrile   - Cont to monitor    Activity: up with assist  DVT ppx: SCDs  Dispo: rehab    Plan d/w Dr. Elizabeth Urrutia. Will check back this afternoon to remove drain and likely transfer patient. Mignon Bright NP     07/05/18 1421  HVAC became disconnected at \"Y-site\" connection. Planned to pull this afternoon anyways. Pulled HVAC drain. Pressure held at site. No suture needed. There was another small site lateral to the incision that was crosshatched in nature draining pink-tinged fluid. Suture placed under sterile conditions to stop drainage from occurring.     TREVOR

## 2018-07-05 NOTE — PROGRESS NOTES
Physical Therapy  7/5/18   Patient chart reviewed. Discussed with RN and observed patient at bedside. Patient just returned to bed and just fell asleep. Has had multiple visitors and has been up in room with RN several times. Will defer therapy and follow up later as able. Olive Barbosa, PT, DPT

## 2018-07-05 NOTE — PROGRESS NOTES
Problem: Pressure Injury - Risk of  Goal: *Prevention of pressure injury  Document Erik Scale and appropriate interventions in the flowsheet.    Outcome: Progressing Towards Goal  Pressure Injury Interventions:  Sensory Interventions: Assess changes in LOC         Activity Interventions: Increase time out of bed    Mobility Interventions: PT/OT evaluation    Nutrition Interventions: Document food/fluid/supplement intake    Friction and Shear Interventions: Minimize layers

## 2018-07-05 NOTE — PROGRESS NOTES
Occupational Therapy Note  7/5/2018    Patient chart reviewed. Discussed pt with physical therapist. Patient just returned to bed and just fell asleep. Has had multiple visitors and has been up in room with RN several times. Will defer therapy and follow up later as able and appropriate.  Thank you,  ALEYDA Logan/JULIÁN

## 2018-07-05 NOTE — PROGRESS NOTES
Hospitalist Progress Note  Shannan Fisher MD  Answering service: [phone]       Date of Service:  2018  NAME:  William Beltran  :  1965  MRN:  [id number]      Admission Summary:   62-year-old man without any significant past medical history who was in his usual state of health until about a week ago when the patient developed a headache. The headache is located at the back of the head, intermittent, a dull ache. No known aggravating or relieving factors. Before the onset of the headache, the patient was swimming in the swimming pool and also diving down into 12 foot depth. The headache did not start immediately, but the headache started later on in the evening. The patient has been having the headache intermittently since this swimming episode. On the day of presentation at the emergency room, the headache became associated with slurred speech. Because of that, the patient went to the Emergency Room at [place]. When the patient arrived at the emergency room, a CT scan of the head was obtained. The CT scan shows a subdural hematoma with mass effect. Interval history / Subjective:   Alert. Complained of right hand numbness. Denied headache. Assessment & Plan:     Subdural hematoma with mild brain compression and mild L to R sub falcine shift. On Keppra and decadron started by the neurosurgeon. Repeat CT scan of the head and CTA of the head did not show any interval changes.  -Underwent crani on 7/3  -Speech and R arm weakness improved. Elevated blood pressure,on Norvasc,BP still not on target,added lisinopril. Code status: Full  DVT prophylaxis: SCD    Care Plan discussed with:Patient and nurse.   Disposition: TBD     Hospital Problems  Date Reviewed: 7/3/2018          Codes Class Noted POA    * (Principal)[facility] ICD-10-CM: I62.00  ICD-9-CM: 432.1  2018 Yes                Review of Systems:   A comprehensive review of systems was negative. Vital Signs:    Last 24hrs VS reviewed since prior progress note. Most recent are:  Visit Vitals    /81    Pulse 71    Temp 98 °F (36.7 °C)    Resp 15    Ht 6' 3\" (1.905 m)    Wt 99.3 kg (218 lb 14.4 oz)    SpO2 97%    BMI 27.36 kg/m2         Intake/Output Summary (Last 24 hours) at 07/05/18 1638  Last data filed at 07/05/18 1300   Gross per 24 hour   Intake             2240 ml   Output             2500 ml   Net             -260 ml        Physical Examination:             Constitutional:  No acute distress, cooperative   ENT:  Oral mucous moist, oropharynx benign. Neck supple,    Resp:  CTA bilaterally. No wheezing/rhonchi/rales. No accessory muscle use   CV:  Regular rhythm, normal rate, no murmurs, gallops, rubs    GI:  Soft, non distended, non tender. normoactive bowel sounds, no hepatosplenomegaly     Musculoskeletal:  No edema, warm, 2+ pulses throughout    Neurologic: RUE slight weakness. Speech almost normal.            Data Review:    Review and/or order of clinical lab test      Labs:     Recent Labs      07/04/18   0522   WBC  17.2*   HGB  13.9   HCT  40.2   PLT  237     Recent Labs      07/04/18   0522   NA  137   K  4.2   CL  107   CO2  22   BUN  25*   CREA  1.09   GLU  173*   CA  8.5     No results for input(s): SGOT, GPT, ALT, AP, TBIL, TBILI, TP, ALB, GLOB, GGT, AML, LPSE in the last 72 hours. No lab exists for component: AMYP, HLPSE  No results for input(s): INR, PTP, APTT in the last 72 hours. No lab exists for component: INREXT, INREXT   No results for input(s): FE, TIBC, PSAT, FERR in the last 72 hours. No results found for: FOL, RBCF   No results for input(s): PH, PCO2, PO2 in the last 72 hours. No results for input(s): CPK, CKNDX, TROIQ in the last 72 hours.     No lab exists for component: CPKMB  Lab Results   Component Value Date/Time    Cholesterol, total 147 07/02/2018 02:42 AM    HDL Cholesterol 42 07/02/2018 02:42 AM    LDL, calculated 89.8 07/02/2018 02:42 AM    Triglyceride 76 07/02/2018 02:42 AM    CHOL/HDL Ratio 3.5 07/02/2018 02:42 AM     Lab Results   Component Value Date/Time    Glucose (POC) 186 (H) 07/05/2018 12:19 PM    Glucose (POC) 152 (H) 07/05/2018 06:16 AM    Glucose (POC) 141 (H) 07/05/2018 12:01 AM    Glucose (POC) 150 (H) 07/04/2018 04:32 PM    Glucose (POC) 145 (H) 07/04/2018 12:10 PM     No results found for: COLOR, APPRN, SPGRU, REFSG, COLETTE, PROTU, GLUCU, KETU, BILU, UROU, MARGARETTE, LEUKU, GLUKE, EPSU, BACTU, WBCU, RBCU, CASTS, UCRY      Medications Reviewed:     Current Facility-Administered Medications   Medication Dose Route Frequency    levETIRAcetam (KEPPRA) tablet 1,000 mg  1,000 mg Oral Q12H    famotidine (PEPCID) tablet 20 mg  20 mg Oral Q12H    labetalol (NORMODYNE;TRANDATE) injection 10 mg  10 mg IntraVENous Q4H PRN    Or    hydrALAZINE (APRESOLINE) 20 mg/mL injection 10 mg  10 mg IntraVENous Q4H PRN    lisinopril (PRINIVIL, ZESTRIL) tablet 10 mg  10 mg Oral DAILY    sodium chloride (NS) flush 5-10 mL  5-10 mL IntraVENous Q8H    sodium chloride (NS) flush 5-10 mL  5-10 mL IntraVENous PRN    acetaminophen (TYLENOL) tablet 650 mg  650 mg Oral Q4H PRN    oxyCODONE-acetaminophen (PERCOCET) 5-325 mg per tablet 1 Tab  1 Tab Oral Q4H PRN    HYDROmorphone (DILAUDID) syringe 1 mg  1 mg IntraVENous Q3H PRN    ondansetron (ZOFRAN) injection 4 mg  4 mg IntraVENous Q4H PRN    docusate sodium (COLACE) capsule 100 mg  100 mg Oral BID    HYDROmorphone (DILAUDID) tablet 2 mg  2 mg Oral Q4H PRN    insulin regular (NOVOLIN R, HUMULIN R) injection   SubCUTAneous Q6H    glucose chewable tablet 16 g  4 Tab Oral PRN    dextrose (D50W) injection syrg 12.5-25 g  12.5-25 g IntraVENous PRN    glucagon (GLUCAGEN) injection 1 mg  1 mg IntraMUSCular PRN    amLODIPine (NORVASC) tablet 10 mg  10 mg Oral DAILY    bisacodyl (DULCOLAX) tablet 5 mg  5 mg Oral DAILY PRN ______________________________________________________________________  EXPECTED LENGTH OF STAY: 2d 4h  ACTUAL LENGTH OF STAY:          4                 Marletta Cheadle, MD

## 2018-07-05 NOTE — PROGRESS NOTES
1930 Bedside and Verbal shift change report given to Murphy Guerrier RN (oncoming nurse) by Ce Byrd RN (offgoing nurse). Report included the following information SBAR, Kardex, ED Summary, OR Summary, Procedure Summary, Intake/Output, MAR, Accordion, Recent Results, Cardiac Rhythm NSR and Alarm Parameters . 0730 Bedside and Verbal shift change report given to Brunilda Camarillo RN (oncoming nurse) by Murphy Guerrier RN (offgoing nurse). Report included the following information SBAR, Kardex, ED Summary, OR Summary, Procedure Summary, Intake/Output, MAR, Accordion, Recent Results, Cardiac Rhythm NSR and Alarm Parameters .

## 2018-07-05 NOTE — PROGRESS NOTES
Speech improving. Face more sym. C/o some numbness right arm. Cont to mobilize.   Likely remove drain later today

## 2018-07-05 NOTE — PROGRESS NOTES
Speech Therapy    Chart reviewed. Note aphasia improving following surgery. Note patient recently returned to bed and is sleeping following several visitors and up in room with RN several times. Will hold off on SLP treatment and f/u later this week as appropriate. Thank you    Tiff Soliz.  Chandni Wilcox M.S., CCC-SLP

## 2018-07-05 NOTE — PROGRESS NOTES
0730: Bedside shift change report given to Kathryn Dejesus RN (oncoming nurse) by Devaughn Kwon (offgoing nurse). Report included the following information SBAR, Kardex, ED Summary, Procedure Summary, Intake/Output, MAR, Accordion and Recent Results. 1930: Bedside shift change report given to Devaughn Kwon (oncoming nurse) by Candace Hendricks (offgoing nurse). Report included the following information SBAR, Kardex, ED Summary, Procedure Summary, Intake/Output, MAR, Accordion and Recent Results. Shift Summary: Neuro intact and unchanged. Hemovac drain removed. Patient up and walked in hallway with no assistance from RN. HA controlled with tylenol. PO SBP meds added to help keep <140. Patient awaiting bed on NSTU.

## 2018-07-05 NOTE — PROGRESS NOTES
1930 Bedside and Verbal shift change report given to Sina Metz RN (oncoming nurse) by Hill Mireles RN (offgoing nurse). Report included the following information SBAR, Kardex, ED Summary, OR Summary, Procedure Summary, Intake/Output, MAR, Accordion, Recent Results, Cardiac Rhythm NSR and Alarm Parameters .

## 2018-07-06 LAB
ALBUMIN SERPL-MCNC: 3.3 G/DL (ref 3.5–5)
ALBUMIN/GLOB SERPL: 1.1 {RATIO} (ref 1.1–2.2)
ALP SERPL-CCNC: 86 U/L (ref 45–117)
ALT SERPL-CCNC: 32 U/L (ref 12–78)
ANION GAP SERPL CALC-SCNC: 10 MMOL/L (ref 5–15)
AST SERPL-CCNC: 14 U/L (ref 15–37)
BASOPHILS # BLD: 0 K/UL (ref 0–0.1)
BASOPHILS NFR BLD: 0 % (ref 0–1)
BILIRUB SERPL-MCNC: 1.1 MG/DL (ref 0.2–1)
BUN SERPL-MCNC: 23 MG/DL (ref 6–20)
BUN/CREAT SERPL: 30 (ref 12–20)
CALCIUM SERPL-MCNC: 8.4 MG/DL (ref 8.5–10.1)
CHLORIDE SERPL-SCNC: 105 MMOL/L (ref 97–108)
CO2 SERPL-SCNC: 24 MMOL/L (ref 21–32)
CREAT SERPL-MCNC: 0.76 MG/DL (ref 0.7–1.3)
DIFFERENTIAL METHOD BLD: ABNORMAL
EOSINOPHIL # BLD: 0 K/UL (ref 0–0.4)
EOSINOPHIL NFR BLD: 0 % (ref 0–7)
ERYTHROCYTE [DISTWIDTH] IN BLOOD BY AUTOMATED COUNT: 13.2 % (ref 11.5–14.5)
GLOBULIN SER CALC-MCNC: 3.1 G/DL (ref 2–4)
GLUCOSE BLD STRIP.AUTO-MCNC: 121 MG/DL (ref 65–100)
GLUCOSE BLD STRIP.AUTO-MCNC: 122 MG/DL (ref 65–100)
GLUCOSE BLD STRIP.AUTO-MCNC: 150 MG/DL (ref 65–100)
GLUCOSE SERPL-MCNC: 122 MG/DL (ref 65–100)
HCT VFR BLD AUTO: 38.7 % (ref 36.6–50.3)
HGB BLD-MCNC: 13.3 G/DL (ref 12.1–17)
IMM GRANULOCYTES # BLD: 0.3 K/UL (ref 0–0.04)
IMM GRANULOCYTES NFR BLD AUTO: 2 % (ref 0–0.5)
LYMPHOCYTES # BLD: 1.7 K/UL (ref 0.8–3.5)
LYMPHOCYTES NFR BLD: 10 % (ref 12–49)
MAGNESIUM SERPL-MCNC: 2.3 MG/DL (ref 1.6–2.4)
MCH RBC QN AUTO: 31 PG (ref 26–34)
MCHC RBC AUTO-ENTMCNC: 34.4 G/DL (ref 30–36.5)
MCV RBC AUTO: 90.2 FL (ref 80–99)
MONOCYTES # BLD: 1.9 K/UL (ref 0–1)
MONOCYTES NFR BLD: 11 % (ref 5–13)
NEUTS SEG # BLD: 13 K/UL (ref 1.8–8)
NEUTS SEG NFR BLD: 77 % (ref 32–75)
NRBC # BLD: 0 K/UL (ref 0–0.01)
NRBC BLD-RTO: 0 PER 100 WBC
PHOSPHATE SERPL-MCNC: 3.6 MG/DL (ref 2.6–4.7)
PLATELET # BLD AUTO: 228 K/UL (ref 150–400)
PMV BLD AUTO: 10.4 FL (ref 8.9–12.9)
POTASSIUM SERPL-SCNC: 3.7 MMOL/L (ref 3.5–5.1)
PROT SERPL-MCNC: 6.4 G/DL (ref 6.4–8.2)
RBC # BLD AUTO: 4.29 M/UL (ref 4.1–5.7)
SERVICE CMNT-IMP: ABNORMAL
SODIUM SERPL-SCNC: 139 MMOL/L (ref 136–145)
WBC # BLD AUTO: 16.8 K/UL (ref 4.1–11.1)

## 2018-07-06 PROCEDURE — 82962 GLUCOSE BLOOD TEST: CPT

## 2018-07-06 PROCEDURE — 97116 GAIT TRAINING THERAPY: CPT

## 2018-07-06 PROCEDURE — 83735 ASSAY OF MAGNESIUM: CPT | Performed by: NEUROLOGICAL SURGERY

## 2018-07-06 PROCEDURE — 80053 COMPREHEN METABOLIC PANEL: CPT | Performed by: NEUROLOGICAL SURGERY

## 2018-07-06 PROCEDURE — 97530 THERAPEUTIC ACTIVITIES: CPT

## 2018-07-06 PROCEDURE — 36415 COLL VENOUS BLD VENIPUNCTURE: CPT | Performed by: NEUROLOGICAL SURGERY

## 2018-07-06 PROCEDURE — 97112 NEUROMUSCULAR REEDUCATION: CPT

## 2018-07-06 PROCEDURE — 85025 COMPLETE CBC W/AUTO DIFF WBC: CPT | Performed by: NEUROLOGICAL SURGERY

## 2018-07-06 PROCEDURE — 74011250637 HC RX REV CODE- 250/637: Performed by: HOSPITALIST

## 2018-07-06 PROCEDURE — 84100 ASSAY OF PHOSPHORUS: CPT | Performed by: NEUROLOGICAL SURGERY

## 2018-07-06 PROCEDURE — 74011250636 HC RX REV CODE- 250/636: Performed by: NURSE PRACTITIONER

## 2018-07-06 PROCEDURE — 74011636637 HC RX REV CODE- 636/637: Performed by: NEUROLOGICAL SURGERY

## 2018-07-06 PROCEDURE — 74011250637 HC RX REV CODE- 250/637: Performed by: NEUROLOGICAL SURGERY

## 2018-07-06 PROCEDURE — 65660000000 HC RM CCU STEPDOWN

## 2018-07-06 PROCEDURE — 92507 TX SP LANG VOICE COMM INDIV: CPT | Performed by: SPEECH-LANGUAGE PATHOLOGIST

## 2018-07-06 PROCEDURE — 74011250637 HC RX REV CODE- 250/637: Performed by: NURSE PRACTITIONER

## 2018-07-06 PROCEDURE — 74011250636 HC RX REV CODE- 250/636: Performed by: NEUROLOGICAL SURGERY

## 2018-07-06 RX ORDER — LISINOPRIL 20 MG/1
20 TABLET ORAL DAILY
Status: DISCONTINUED | OUTPATIENT
Start: 2018-07-07 | End: 2018-07-07

## 2018-07-06 RX ORDER — HYDRALAZINE HYDROCHLORIDE 20 MG/ML
10 INJECTION INTRAMUSCULAR; INTRAVENOUS
Status: DISCONTINUED | OUTPATIENT
Start: 2018-07-06 | End: 2018-07-08

## 2018-07-06 RX ORDER — MAGNESIUM CITRATE
296 SOLUTION, ORAL ORAL
Status: DISCONTINUED | OUTPATIENT
Start: 2018-07-06 | End: 2018-07-06

## 2018-07-06 RX ORDER — LABETALOL HYDROCHLORIDE 5 MG/ML
10 INJECTION, SOLUTION INTRAVENOUS
Status: DISCONTINUED | OUTPATIENT
Start: 2018-07-06 | End: 2018-07-08

## 2018-07-06 RX ADMIN — Medication 10 ML: at 07:24

## 2018-07-06 RX ADMIN — AMLODIPINE BESYLATE 10 MG: 5 TABLET ORAL at 08:08

## 2018-07-06 RX ADMIN — HYDRALAZINE HYDROCHLORIDE 10 MG: 20 INJECTION INTRAMUSCULAR; INTRAVENOUS at 10:10

## 2018-07-06 RX ADMIN — Medication 10 ML: at 14:00

## 2018-07-06 RX ADMIN — ACETAMINOPHEN 650 MG: 325 TABLET ORAL at 20:06

## 2018-07-06 RX ADMIN — ACETAMINOPHEN 650 MG: 325 TABLET ORAL at 03:20

## 2018-07-06 RX ADMIN — ACETAMINOPHEN 650 MG: 325 TABLET ORAL at 07:22

## 2018-07-06 RX ADMIN — FAMOTIDINE 20 MG: 20 TABLET ORAL at 20:06

## 2018-07-06 RX ADMIN — ONDANSETRON 4 MG: 2 INJECTION INTRAMUSCULAR; INTRAVENOUS at 12:37

## 2018-07-06 RX ADMIN — LEVETIRACETAM 1000 MG: 500 TABLET ORAL at 20:06

## 2018-07-06 RX ADMIN — DOCUSATE SODIUM 100 MG: 100 CAPSULE, LIQUID FILLED ORAL at 18:11

## 2018-07-06 RX ADMIN — ACETAMINOPHEN 650 MG: 325 TABLET ORAL at 16:07

## 2018-07-06 RX ADMIN — LISINOPRIL 10 MG: 10 TABLET ORAL at 08:08

## 2018-07-06 RX ADMIN — HYDRALAZINE HYDROCHLORIDE 10 MG: 20 INJECTION INTRAMUSCULAR; INTRAVENOUS at 03:51

## 2018-07-06 RX ADMIN — ACETAMINOPHEN 650 MG: 325 TABLET ORAL at 11:45

## 2018-07-06 RX ADMIN — LEVETIRACETAM 1000 MG: 500 TABLET ORAL at 08:08

## 2018-07-06 RX ADMIN — LABETALOL HYDROCHLORIDE 10 MG: 5 INJECTION, SOLUTION INTRAVENOUS at 02:04

## 2018-07-06 RX ADMIN — DOCUSATE SODIUM 100 MG: 100 CAPSULE, LIQUID FILLED ORAL at 08:08

## 2018-07-06 RX ADMIN — FAMOTIDINE 20 MG: 20 TABLET ORAL at 08:08

## 2018-07-06 RX ADMIN — HUMAN INSULIN 2 UNITS: 100 INJECTION, SOLUTION SUBCUTANEOUS at 18:17

## 2018-07-06 NOTE — PROGRESS NOTES
Hospitalist Progress Note  Ahsan Cerrato MD  Answering service: [phone]       Date of Service:  2018  NAME:  Jermaine Barrett  :  1965  MRN:  [id number]      Admission Summary:   50-year-old man without any significant past medical history who was in his usual state of health until about a week ago when the patient developed a headache. The headache is located at the back of the head, intermittent, a dull ache. No known aggravating or relieving factors. Before the onset of the headache, the patient was swimming in the swimming pool and also diving down into 12 foot depth. The headache did not start immediately, but the headache started later on in the evening. The patient has been having the headache intermittently since this swimming episode. On the day of presentation at the emergency room, the headache became associated with slurred speech. Because of that, the patient went to the Emergency Room at [facility]. When the patient arrived at the emergency room, a CT scan of the head was obtained. The CT scan shows a subdural hematoma with mass effect. Interval history / Subjective:   Alert. Right hand numbness has resolved     Assessment & Plan:     Subdural hematoma with mild brain compression and mild L to R sub falcine shift. On Keppra and decadron started by the neurosurgeon. Repeat CT scan of the head and CTA of the head did not show any interval changes.  -Underwent crani on 7/3  -Speech and R arm weakness improved. Elevated blood pressure,on Norvasc,increased lisinopril. Code status: Full  DVT prophylaxis: SCD    Care Plan discussed with:Patient and nurse.   Disposition: rehab evaluating him     Hospital Problems  Date Reviewed: 7/3/2018          Codes Class Noted POA    * (Principal)[facility] ICD-10-CM: I62.00  ICD-9-CM: 432.1  2018 Yes                Review of Systems:   A comprehensive review of systems was negative. Vital Signs:    Last 24hrs VS reviewed since prior progress note. Most recent are:  Visit Vitals    BP (!) 161/92 (BP 1 Location: Left arm, BP Patient Position: At rest)    Pulse (!) 58    Temp 98 °F (36.7 °C)    Resp 14    Ht 6' 3\" (1.905 m)    Wt 97.8 kg (215 lb 11.2 oz)    SpO2 97%    BMI 26.96 kg/m2         Intake/Output Summary (Last 24 hours) at 07/06/18 1631  Last data filed at 07/06/18 0800   Gross per 24 hour   Intake                0 ml   Output             1900 ml   Net            -1900 ml        Physical Examination:             Constitutional:  No acute distress, cooperative   ENT:  Oral mucous moist, oropharynx benign. Neck supple,    Resp:  CTA bilaterally. No wheezing/rhonchi/rales. No accessory muscle use   CV:  Regular rhythm, normal rate, no murmurs, gallops, rubs    GI:  Soft, non distended, non tender. normoactive bowel sounds, no hepatosplenomegaly     Musculoskeletal:  No edema, warm, 2+ pulses throughout    Neurologic: RUE slight weakness. Speech almost normal.            Data Review:    Review and/or order of clinical lab test      Labs:     Recent Labs      07/06/18   0322  07/04/18   0522   WBC  16.8*  17.2*   HGB  13.3  13.9   HCT  38.7  40.2   PLT  228  237     Recent Labs      07/06/18   0554  07/04/18   0522   NA  139  137   K  3.7  4.2   CL  105  107   CO2  24  22   BUN  23*  25*   CREA  0.76  1.09   GLU  122*  173*   CA  8.4*  8.5   MG  2.3   --    PHOS  3.6   --      Recent Labs      07/06/18   0554   SGOT  14*   ALT  32   AP  86   TBILI  1.1*   TP  6.4   ALB  3.3*   GLOB  3.1     No results for input(s): INR, PTP, APTT in the last 72 hours. No lab exists for component: INREXT, INREXT   No results for input(s): FE, TIBC, PSAT, FERR in the last 72 hours. No results found for: FOL, RBCF   No results for input(s): PH, PCO2, PO2 in the last 72 hours. No results for input(s): CPK, CKNDX, TROIQ in the last 72 hours.     No lab exists for component: CPKMB  Lab Results   Component Value Date/Time    Cholesterol, total 147 07/02/2018 02:42 AM    HDL Cholesterol 42 07/02/2018 02:42 AM    LDL, calculated 89.8 07/02/2018 02:42 AM    Triglyceride 76 07/02/2018 02:42 AM    CHOL/HDL Ratio 3.5 07/02/2018 02:42 AM     Lab Results   Component Value Date/Time    Glucose (POC) 122 (H) 07/06/2018 11:50 AM    Glucose (POC) 121 (H) 07/06/2018 07:17 AM    Glucose (POC) 175 (H) 07/05/2018 11:06 PM    Glucose (POC) 224 (H) 07/05/2018 05:59 PM    Glucose (POC) 186 (H) 07/05/2018 12:19 PM     No results found for: COLOR, APPRN, SPGRU, REFSG, COLETTE, PROTU, GLUCU, KETU, BILU, UROU, MARGARETTE, LEUKU, GLUKE, EPSU, BACTU, WBCU, RBCU, CASTS, UCRY      Medications Reviewed:     Current Facility-Administered Medications   Medication Dose Route Frequency    labetalol (NORMODYNE;TRANDATE) injection 10 mg  10 mg IntraVENous Q4H PRN    Or    hydrALAZINE (APRESOLINE) 20 mg/mL injection 10 mg  10 mg IntraVENous Q4H PRN    levETIRAcetam (KEPPRA) tablet 1,000 mg  1,000 mg Oral Q12H    famotidine (PEPCID) tablet 20 mg  20 mg Oral Q12H    lisinopril (PRINIVIL, ZESTRIL) tablet 10 mg  10 mg Oral DAILY    sodium chloride (NS) flush 5-10 mL  5-10 mL IntraVENous Q8H    sodium chloride (NS) flush 5-10 mL  5-10 mL IntraVENous PRN    acetaminophen (TYLENOL) tablet 650 mg  650 mg Oral Q4H PRN    oxyCODONE-acetaminophen (PERCOCET) 5-325 mg per tablet 1 Tab  1 Tab Oral Q4H PRN    HYDROmorphone (DILAUDID) syringe 1 mg  1 mg IntraVENous Q3H PRN    ondansetron (ZOFRAN) injection 4 mg  4 mg IntraVENous Q4H PRN    docusate sodium (COLACE) capsule 100 mg  100 mg Oral BID    HYDROmorphone (DILAUDID) tablet 2 mg  2 mg Oral Q4H PRN    insulin regular (NOVOLIN R, HUMULIN R) injection   SubCUTAneous Q6H    glucose chewable tablet 16 g  4 Tab Oral PRN    dextrose (D50W) injection syrg 12.5-25 g  12.5-25 g IntraVENous PRN    glucagon (GLUCAGEN) injection 1 mg  1 mg IntraMUSCular PRN    amLODIPine (NORVASC) tablet 10 mg  10 mg Oral DAILY    bisacodyl (DULCOLAX) tablet 5 mg  5 mg Oral DAILY PRN     ______________________________________________________________________  EXPECTED LENGTH OF STAY: 7d 0h  ACTUAL LENGTH OF STAY:          5                 Jerman Brown MD

## 2018-07-06 NOTE — PROGRESS NOTES
1930 Bedside and Verbal shift change report [facility] ([facility] nurse) by Rg Hays RN (offgoing nurse). Report included the following information SBAR, Kardex, ED Summary, Procedure Summary, Intake/Output, MAR, Accordion, Recent Results, Cardiac Rhythm NSR and Alarm Parameters . 0730 Bedside and Verbal shift change report given to Rg Hays RN (oncoming nurse) by [facility], RN (offgoing nurse). Report included the following information SBAR, Kardex, ED Summary, OR Summary, Procedure Summary, Intake/Output, MAR, Accordion, Recent Results, Cardiac Rhythm NSR and Alarm Parameters . Shift summary:  Pt remains alert and oriented X4, ESCOTO, states decreased RUE numbness, walked around unit a few times with family without assistance. Bathed. Strong family support. Wife at bedside.

## 2018-07-06 NOTE — ROUTINE PROCESS
0730: Bedside shift change report given to Carmelo Dasilva RN (oncoming nurse) by Nguyễn Rice (offgoing nurse). Report included the following information SBAR, Kardex, ED Summary, OR Summary, Procedure Summary, Intake/Output, MAR, Accordion, Recent Results and Med Rec Status. 1930: Bedside shift change report given to Nguyễn Rice (oncoming nurse) by [facility] (offgoing nurse). Report included the following information SBAR, Kardex, ED Summary, Procedure Summary, Intake/Output, MAR, Accordion, Recent Results and Med Rec Status.

## 2018-07-06 NOTE — PROGRESS NOTES
Neurosurgery Progress Note  Lakesha Braga, [facility]  [phone]        Admit Date: 2018   LOS: 5 days        Daily Progress Note: 2018    POD: 3 Days Post-Op    S/P: Procedure(s):  LEFT FRONTAL TEMPORAL CRANIOTOMY FOR SUBDURAL HEMATOMA    HPI: The patient was apparently diving down to around 12 feet last week when he developed a headache shortly thereafter. He also developed difficulty with his speech that lasted about 10 minutes and then another one lasting about 2-3 minutes. He presented to the ER at [facility] where a head CT revealed a 6 mm SDH with some mass effect. The patient was transferred to [facility] for neurosurgical evaluation. The patient was observed in the ICU initially as his wife was hoping to avoid surgery. Unfortunately, on Tuesday night, the patient became more lethargic with worsening expressive aphasia and right sided weakness. The patient was taken to the OR emergently for evacuation of his SDH by Dr. Reba Dumont. Subjective:   No acute events overnight. He did receive a few doses of PRN BP meds. Lisinopril was started by the hospitalist yesterday. Steroids were also completed yesterday. Rehab referral made. Denies chest pain, leg pain, nausea, vomiting, difficulty swallowing, and dyspnea. Objective:     Vital signs  Temp (24hrs), Av °F (36.7 °C), Min:97.7 °F (36.5 °C), Max:98.2 °F (36.8 °C)   701 - 1900  In: -   Out: 250 [Urine:250]  1901 -  0700  In: 4247 [P.O.:240;  I.V.:1500]  Out: 4110 [Urine:3880; Drains:230]    Visit Vitals    /78    Pulse 62    Temp 97.8 °F (36.6 °C)    Resp 14    Ht 6' 3\" (1.905 m)    Wt 97.8 kg (215 lb 11.2 oz)    SpO2 97%    BMI 26.96 kg/m2    O2 Flow Rate (L/min): 2 l/min O2 Device: Room air     Pain control  Pain Assessment  Pain Scale 1: Numeric (0 - 10)  Pain Intensity 1: 0  Pain Onset 1: on going  Pain Location 1: Head  Pain Orientation 1: Anterior  Pain Description 1: Aching  Pain Intervention(s) 1: Medication (see MAR)    PT/OT  Gait     Gait  Speed/Zelda: Shuffled  Step Length: Right shortened, Left shortened  Gait Abnormalities: Decreased step clearance, Altered arm swing, Trunk sway increased  Distance (ft): 10 Feet (ft)           Physical Exam:  Gen:NAD. Neuro: A&Ox3. Follows commands. Speech mild expressive aphasia. Affect flat. PERRL. EOMI. Face symmetric. Palate Tongue midline. ESCOTO spontaneously. Strength 5/5 in LUE/LLE and 5-/5 LUE/LLE. Negative drift. Gait deferred. Skin: Incision left scalp C/D/I with staples in place. No erythema or drainage. Some edema under the left temple area where sutured was placed. CT head without contrast on 07/01/18 shows acute-subacute left subdural hematoma with mass effect including 6  mm contralateral subfalcine herniation. CT head without contrast on 07/04/18 shows the patient is status post left craniotomy with evacuation of an acute left subdural hematoma. There is air in the left subdural space. There is residual effacement of the underlying left sulci. The lateral ventricles are now slightly larger and the left to right shift has decreased to 2 mm. 24 hour results:    Recent Results (from the past 24 hour(s))   GLUCOSE, POC    Collection Time: 07/05/18 12:19 PM   Result Value Ref Range    Glucose (POC) 186 (H) 65 - 100 mg/dL    Performed by [address] [place] [address], POC    Collection Time: 07/05/18  5:59 PM   Result Value Ref Range    Glucose (POC) 224 (H) 65 - 100 mg/dL    Performed by [address] [place] [address], POC    Collection Time: 07/05/18 11:06 PM   Result Value Ref Range    Glucose (POC) 175 (H) 65 - 100 mg/dL    Performed by Meaghan Ramirez    CBC WITH AUTOMATED DIFF    Collection Time: 07/06/18  3:22 AM   Result Value Ref Range    WBC 16.8 (H) 4.1 - 11.1 K/uL    RBC 4.29 4. 10 - 5.70 M/uL    HGB 13.3 12.1 - 17.0 g/dL    HCT 38.7 36.6 - 50.3 %    MCV 90.2 80.0 - 99.0 FL    MCH 31.0 26.0 - 34.0 PG    MCHC 34.4 30.0 - 36.5 g/dL    RDW 13.2 11.5 - 14.5 %    PLATELET 737 779 - 442 K/uL    MPV 10.4 8.9 - 12.9 FL    NRBC 0.0 0  WBC    ABSOLUTE NRBC 0.00 0.00 - 0.01 K/uL    NEUTROPHILS 77 (H) 32 - 75 %    LYMPHOCYTES 10 (L) 12 - 49 %    MONOCYTES 11 5 - 13 %    EOSINOPHILS 0 0 - 7 %    BASOPHILS 0 0 - 1 %    IMMATURE GRANULOCYTES 2 (H) 0.0 - 0.5 %    ABS. NEUTROPHILS 13.0 (H) 1.8 - 8.0 K/UL    ABS. LYMPHOCYTES 1.7 0.8 - 3.5 K/UL    ABS. MONOCYTES 1.9 (H) 0.0 - 1.0 K/UL    ABS. EOSINOPHILS 0.0 0.0 - 0.4 K/UL    ABS. BASOPHILS 0.0 0.0 - 0.1 K/UL    ABS. IMM. GRANS. 0.3 (H) 0.00 - 0.04 K/UL    DF AUTOMATED     METABOLIC PANEL, COMPREHENSIVE    Collection Time: 07/06/18  5:54 AM   Result Value Ref Range    Sodium 139 136 - 145 mmol/L    Potassium 3.7 3.5 - 5.1 mmol/L    Chloride 105 97 - 108 mmol/L    CO2 24 21 - 32 mmol/L    Anion gap 10 5 - 15 mmol/L    Glucose 122 (H) 65 - 100 mg/dL    BUN 23 (H) 6 - 20 MG/DL    Creatinine 0.76 0.70 - 1.30 MG/DL    BUN/Creatinine ratio 30 (H) 12 - 20      GFR est AA >60 >60 ml/min/1.73m2    GFR est non-AA >60 >60 ml/min/1.73m2    Calcium 8.4 (L) 8.5 - 10.1 MG/DL    Bilirubin, total 1.1 (H) 0.2 - 1.0 MG/DL    ALT (SGPT) 32 12 - 78 U/L    AST (SGOT) 14 (L) 15 - 37 U/L    Alk.  phosphatase 86 45 - 117 U/L    Protein, total 6.4 6.4 - 8.2 g/dL    Albumin 3.3 (L) 3.5 - 5.0 g/dL    Globulin 3.1 2.0 - 4.0 g/dL    A-G Ratio 1.1 1.1 - 2.2     MAGNESIUM    Collection Time: 07/06/18  5:54 AM   Result Value Ref Range    Magnesium 2.3 1.6 - 2.4 mg/dL   PHOSPHORUS    Collection Time: 07/06/18  5:54 AM   Result Value Ref Range    Phosphorus 3.6 2.6 - 4.7 MG/DL   GLUCOSE, POC    Collection Time: 07/06/18  7:17 AM   Result Value Ref Range    Glucose (POC) 121 (H) 65 - 100 mg/dL    [facility] by [facility]           Assessment:     Principal Problem:    Subdural hematoma ([address] (7/1/2018)        Plan:   1. Subdural hematoma   - s/p crani 07/03   - neuro checks q4h   - decadron completed   - keppra for sz ppx   - pepcid for GI ppx   - PT/OT/Speech   - Transfer orders for NSTU placed 07/05'   - Rehab referral in process  2. Brain compression   - due to #1   - plans as above  3. Acute encephalopathy   - due to #1, 2   - plans as above  4. HTN   - SBP<160   - Cont Norvasc and Lisinopril   - Hydralazine/labetalol PRN   - Hospitalist/intensivist following  5. Hyperglycemia, steroid-induced   - SSI and accu-checks   - Hospitalist following  6. Leukocytosis   - WBC 16.8 (downtrending)   - Likely due to steroids and inflammation from surgery   - Afebrile   - Cont to monitor    Activity: up with assist  DVT ppx: SCDs  Dispo: rehab    Plan d/w Dr. Martin Hassan. Pt will likely be ready to go to rehab in the next day or so when accepted.       Estelle Foreman NP

## 2018-07-06 NOTE — PROGRESS NOTES
Problem: Self Care Deficits Care Plan (Adult)  Goal: *Acute Goals and Plan of Care (Insert Text)  Occupational Therapy Goals  Initiated 7/4/2018    1. Patient will perform self-feeding using RUE as FM assists with AE PRN with Otoniel within 7 days. 2.  Patient will perform bathing standing at sink with CGA for balance and Otoniel tasks within 7 days. 3.  Patient will perform UB dressing, including FM aspects, with Otoniel within 7 days. 4.  Patient will perform BUE coordination tasks with Otoniel within 7 days. 5.  Patient will tolerate standing ADLs for 8 minutes with no LOB and CGA-SBA within 7 days. Occupational Therapy TREATMENT  Patient: Elvia Hernadez (6 y.o. male)  Date: 7/6/2018  Diagnosis: Hemorragic stroke  Subdural hematoma (HCC)  LEFT FRONTAL TEMPORAL HEMATOMA Subdural hematoma (HCC)  Procedure(s) (LRB):  LEFT FRONTAL TEMPORAL CRANIOTOMY FOR SUBDURAL HEMATOMA (Left) 3 Days Post-Op  Precautions: Fall, Seizure  Chart, occupational therapy assessment, plan of care, and goals were reviewed. ASSESSMENT:  Pt making steady progress with acute therapy. Pt cleared by RN for therapy. Pt received resting in bed with wife present, pt easily aroused. Pt oriented x4. Pt participated in bed mobility, functional transfers, toileting, dynamic standing tasks to gather ADLs, UB/LB dressing in standing, and cognitive/recall tasks. ADLs continue to be limited by generally decreased functional strength >RUE, decreased attention/processing/recall, R hand numbness (improving), mildly impaired RUE coordination/FM strength, word finding difficulties (greatly improving). Continue to recommend short stay inpatient rehab as pt was completely IND with ADL/IADL tasks, works labor intensive full time job and was driving prior to admission. Pt left in chair at end of session, wife present and call bell in reach, NAD.     Progression toward goals:  [x]       Improving appropriately and progressing toward goals  []       Improving slowly and progressing toward goals  []       Not making progress toward goals and plan of care will be adjusted     PLAN:  Patient continues to benefit from skilled intervention to address the above impairments. Continue treatment per established plan of care. Discharge Recommendations:  Inpatient Rehab  Further Equipment Recommendations for Discharge:  TBD     SUBJECTIVE:   Patient stated I feel better; I got sick earlier, I think it was too much medication.     OBJECTIVE DATA SUMMARY:   Cognitive/Behavioral Status:  Neurologic State: Alert; Appropriate for age  Orientation Level: Oriented X4  Cognition: Follows commands; Appropriate safety awareness; Appropriate for age attention/concentration; Appropriate decision making  Perception: Appears intact  Perseveration: No perseveration noted  Safety/Judgement: Awareness of environment    Functional Mobility and Transfers for ADLs:  Bed Mobility:  Supine to Sit: Modified independent    Transfers:  Sit to Stand: Stand-by assistance  Functional Transfers  Toilet Transfer : Stand-by assistance       Balance:  Sitting: Intact  Standing: Impaired; Without support  Standing - Static: Good  Standing - Dynamic : Good    ADL Intervention:                      Upper Body Dressing Assistance  Pullover Shirt: Contact guard assistance (donned standing)    Lower Body Dressing Assistance  Pants With Button/Zipper: Contact guard assistance (performed standing)         Cognitive Retraining  Safety/Judgement: Awareness of environment    Pt able to write 7 words that begin with letter \"F\" in 1 minute. Able to write grocery list for \"spaghetti\" and Citizen of the [place] toast breakfast.    Activity Tolerance:   VSS    Please refer to the flowsheet for vital signs taken during this treatment.   After treatment:   [x] Patient left in no apparent distress sitting up in chair  [] Patient left in no apparent distress in bed  [x] Call bell left within reach  [x] Nursing notified  [x] Caregiver present  [] Bed alarm activated    COMMUNICATION/COLLABORATION:   The patients plan of care was discussed with: Physical Therapist and Registered Nurse    Winter Rodriguez OT  Time Calculation: 37 mins

## 2018-07-06 NOTE — PROGRESS NOTES
1930 Bedside and Verbal shift change report given to Jason Marinelli RN (oncoming nurse) by Ji Thomas RN (offgoing nurse). Report included the following information SBAR, Kardex, ED Summary, OR Summary, Procedure Summary, Intake/Output, MAR, Accordion, Recent Results, Cardiac Rhythm NSR/Sinus Carlos Nelson and Alarm Parameters .

## 2018-07-06 NOTE — PROGRESS NOTES
Problem: Mobility Impaired (Adult and Pediatric)  Goal: *Acute Goals and Plan of Care (Insert Text)  Physical Therapy Goals  Initiated 7/4/2018  1. Patient will move from supine to sit and sit to supine , scoot up and down and roll side to side in bed with independence within 7 day(s). 2.  Patient will transfer from bed to chair and chair to bed with supervision/set-up using the least restrictive device within 7 day(s). 3.  Patient will perform sit to stand with supervision/set-up within 7 day(s). 4.  Patient will ambulate with minimal assistance/contact guard assist for 100 feet with the least restrictive device within 7 day(s). 5.  Patient will ascend/descend 3 stairs with 1 handrail(s) with supervision/set-up within 7 day(s). 6.  Patient will improve Lorenzana Balance score by 7 points within 7 days. physical Therapy TREATMENT  Patient: Rg Sanchez (19 y.o. male)  Date: 7/6/2018  Diagnosis: Hemorragic stroke  Subdural hematoma (HCC)  LEFT FRONTAL TEMPORAL HEMATOMA Subdural hematoma (HCC)  Procedure(s) (LRB):  LEFT FRONTAL TEMPORAL CRANIOTOMY FOR SUBDURAL HEMATOMA (Left) 3 Days Post-Op  Precautions: Fall, Seizure  Chart, physical therapy assessment, plan of care and goals were reviewed. ASSESSMENT:  Patient cleared by RN for continued mobility with PT. Patient received sleeping in chair. He reports his hand is still numb but improved and he was better able to use a pen and fork. Patient agreeable to participate in gait training. He continues with impaired static and dynamic balance with increased trunk sway and narrow ARIANA. During gait, patient required CGA/min A at times to regain balance as patient tends to lean to his R. Returned to room and seated in chair. Continue to recommend d/c to inpatient rehab to optimize independence and safety with mobility. Patient BP increased 181/100 at end of session. RN notified.    Progression toward goals:  [x]    Improving appropriately and progressing toward goals  []    Improving slowly and progressing toward goals  []    Not making progress toward goals and plan of care will be adjusted     PLAN:  Patient continues to benefit from skilled intervention to address the above impairments. Continue treatment per established plan of care. Discharge Recommendations:  Inpatient Rehab  Further Equipment Recommendations for Discharge:  tbd     SUBJECTIVE:   Patient stated I am lethargic.     OBJECTIVE DATA SUMMARY:   Critical Behavior:  Neurologic State: Alert, Appropriate for age  Orientation Level: Oriented X4  Cognition: Follows commands  Safety/Judgement: Awareness of environment  Functional Mobility Training:  Bed Mobility:                    Transfers:  Sit to Stand: Contact guard assistance; Additional time  Stand to Sit: Contact guard assistance; Additional time                             Balance:  Sitting: Intact  Standing: Impaired  Standing - Static: Good;Constant support  Standing - Dynamic : Fair  Ambulation/Gait Training:     Assistive Device: Gait belt (HHA)  Ambulation - Level of Assistance: Contact guard assistance;Minimal assistance        Gait Abnormalities: Decreased step clearance; Altered arm swing;Trunk sway increased (trunk sway to R)        Base of Support: Narrowed; Shift to right     Speed/Zelda: Slow  Step Length: Right shortened;Left shortened              Pain:  Pain Scale 1: Numeric (0 - 10)  Pain Intensity 1: 3  Pain Location 1: Head  Pain Orientation 1: Anterior  Pain Description 1: Aching  Pain Intervention(s) 1: Repositioned (back to bed)  Activity Tolerance:   HTN  Please refer to the flowsheet for vital signs taken during this treatment.   After treatment:   [x]    Patient left in no apparent distress sitting up in chair  []    Patient left in no apparent distress in bed  [x]    Call bell left within reach  [x]    Nursing notified  [x]    Caregiver present  []    Bed alarm activated    COMMUNICATION/COLLABORATION:   The patients plan of care was discussed with: Occupational Therapist and Registered Nurse    Vesna Vila PT, DPT   Time Calculation: 24 mins

## 2018-07-06 NOTE — PROGRESS NOTES
Problem: Communication Impaired (Adult)  Goal: *Speech Goal: (INSERT TEXT)  Speech Therapy Goals  Re-evaluation 7/4/2018 s/p craniotomy  1. Patient will follow 2 step commands with 80% accuracy given min cues within 7 days  2. Patient will complete open ended and verb sentence completion tasks with 80% accuracy given min cues within 7 days  3. Patient will complete moderate-complex responsive naming tasks with 80% accuracy given min cues within 7 days    Initiated 7/3/2018  Patient will, within 7 days:  1. Follow 2 step commands 50%. Continue  2. Answer yes/no simple questions 70%. MET  3. Complete automatic sentences 75%. MET   Speech language pathology treatment  Patient: Ashly Rosenberg (72 y.o. male)  Date: 7/6/2018  Diagnosis: Hemorragic stroke  Subdural hematoma (HCC)  LEFT FRONTAL TEMPORAL HEMATOMA Subdural hematoma (HCC)  Procedure(s) (LRB):  LEFT FRONTAL TEMPORAL CRANIOTOMY FOR SUBDURAL HEMATOMA (Left) 3 Days Post-Op    ASSESSMENT:  Patient continues with significant improvement in all areas. Focused on reading and writing today which were effortful for patient but without errors. Encouraged him to continues this throughout weekend. Progression toward goals:  [x]       Improving appropriately and progressing toward goals  []       Improving slowly and progressing toward goals  []       Not making progress toward goals and plan of care will be adjusted     PLAN:  Patient continues to benefit from skilled intervention to address the above impairments. Continue treatment per established plan of care. Discharge Recommendations:  Inpatient Rehab     SUBJECTIVE:   Patient stated Is it okay to write in all caps? .     OBJECTIVE:   Mental Status:  Neurologic State: Alert, Appropriate for age  Orientation Level: Oriented X4  Cognition: Follows commands  Perception: Cues to attend to right side of body, Verbal  Perseveration: No perseveration noted  Safety/Judgement: Awareness of environment  Treatment & Interventions: Motor Speech:            Speech Characteristics: Word retrieval  Apraxic Characteristics: Visible/audible searching; Abnormal prosodic features  Dysarthric Characteristics:  (very mild imprecision)     Language Comprehension and Expression:   Follows 1 step commands 100%  2 step commands 75%    Verbal Expression  Primary Mode of Expression: Verbal  Initiation: No impairment  Automatic Speech Task: No impairment  Repetition:  (80%: difficulty with only very complex sentence)            Naming: Impaired  Confrontation (%): 100 %     Divergent (%): 75 %           Sentence Completion: No impairment           Sentence Formulation: No impairment (extra time needed)        Reading sentences: slow and effortful but no errors reading headlines  WRiting: wrote address and a few sentences without errors. Larger print. Writes in all caps which is his normal habit.       Response & Tolerance to Activities:   Good    Pain:  Pain Scale 1: Numeric (0 - 10)  Pain Intensity 1: 3  Pain Location 1: Head  After treatment:   [x]       Patient left in no apparent distress sitting up in chair  []       Patient left in no apparent distress in bed  [x]       Call bell left within reach  [x]       Nursing notified  [x]       Caregiver present  []       Bed alarm activated    COMMUNICATION/COLLABORATION:     The patients plan of care was discussed with: Registered Nurse    Anna Alexander SLP  Time Calculation: 20 mins

## 2018-07-07 LAB
GLUCOSE BLD STRIP.AUTO-MCNC: 105 MG/DL (ref 65–100)
GLUCOSE BLD STRIP.AUTO-MCNC: 116 MG/DL (ref 65–100)
GLUCOSE BLD STRIP.AUTO-MCNC: 117 MG/DL (ref 65–100)
SERVICE CMNT-IMP: ABNORMAL

## 2018-07-07 PROCEDURE — 74011250636 HC RX REV CODE- 250/636: Performed by: NURSE PRACTITIONER

## 2018-07-07 PROCEDURE — 74011250637 HC RX REV CODE- 250/637: Performed by: NEUROLOGICAL SURGERY

## 2018-07-07 PROCEDURE — 74011250637 HC RX REV CODE- 250/637: Performed by: NURSE PRACTITIONER

## 2018-07-07 PROCEDURE — 82962 GLUCOSE BLOOD TEST: CPT

## 2018-07-07 PROCEDURE — 65660000000 HC RM CCU STEPDOWN

## 2018-07-07 PROCEDURE — 74011250637 HC RX REV CODE- 250/637: Performed by: HOSPITALIST

## 2018-07-07 RX ORDER — LISINOPRIL 20 MG/1
40 TABLET ORAL DAILY
Status: DISCONTINUED | OUTPATIENT
Start: 2018-07-08 | End: 2018-07-11 | Stop reason: HOSPADM

## 2018-07-07 RX ADMIN — LISINOPRIL 20 MG: 20 TABLET ORAL at 09:10

## 2018-07-07 RX ADMIN — Medication 10 ML: at 21:27

## 2018-07-07 RX ADMIN — AMLODIPINE BESYLATE 10 MG: 5 TABLET ORAL at 09:11

## 2018-07-07 RX ADMIN — DOCUSATE SODIUM 100 MG: 100 CAPSULE, LIQUID FILLED ORAL at 17:50

## 2018-07-07 RX ADMIN — OXYCODONE HYDROCHLORIDE AND ACETAMINOPHEN 1 TABLET: 5; 325 TABLET ORAL at 17:50

## 2018-07-07 RX ADMIN — OXYCODONE HYDROCHLORIDE AND ACETAMINOPHEN 1 TABLET: 5; 325 TABLET ORAL at 21:29

## 2018-07-07 RX ADMIN — HYDRALAZINE HYDROCHLORIDE 10 MG: 20 INJECTION INTRAMUSCULAR; INTRAVENOUS at 11:25

## 2018-07-07 RX ADMIN — OXYCODONE HYDROCHLORIDE AND ACETAMINOPHEN 1 TABLET: 5; 325 TABLET ORAL at 00:30

## 2018-07-07 RX ADMIN — Medication 10 ML: at 06:29

## 2018-07-07 RX ADMIN — LEVETIRACETAM 1000 MG: 500 TABLET ORAL at 21:26

## 2018-07-07 RX ADMIN — HYDRALAZINE HYDROCHLORIDE 10 MG: 20 INJECTION INTRAMUSCULAR; INTRAVENOUS at 15:10

## 2018-07-07 RX ADMIN — FAMOTIDINE 20 MG: 20 TABLET ORAL at 21:26

## 2018-07-07 RX ADMIN — HYDRALAZINE HYDROCHLORIDE 10 MG: 20 INJECTION INTRAMUSCULAR; INTRAVENOUS at 19:02

## 2018-07-07 RX ADMIN — LEVETIRACETAM 1000 MG: 500 TABLET ORAL at 09:10

## 2018-07-07 RX ADMIN — DOCUSATE SODIUM 100 MG: 100 CAPSULE, LIQUID FILLED ORAL at 09:11

## 2018-07-07 RX ADMIN — Medication 10 ML: at 15:09

## 2018-07-07 RX ADMIN — FAMOTIDINE 20 MG: 20 TABLET ORAL at 09:10

## 2018-07-07 RX ADMIN — OXYCODONE HYDROCHLORIDE AND ACETAMINOPHEN 1 TABLET: 5; 325 TABLET ORAL at 06:29

## 2018-07-07 RX ADMIN — Medication 10 ML: at 00:21

## 2018-07-07 RX ADMIN — OXYCODONE HYDROCHLORIDE AND ACETAMINOPHEN 1 TABLET: 5; 325 TABLET ORAL at 12:50

## 2018-07-07 NOTE — ROUTINE PROCESS
0730 Bedside shift change report given to WYATT Purvis RN (oncoming nurse) by Jordyn Landin RN (offgoing nurse). Report included the following information SBAR, Kardex, ED Summary, Procedure Summary, Intake/Output, MAR, Accordion and Recent Results. 1530 TRANSFER - OUT REPORT:    Verbal report given to Ruby Adan RN(name) on Iesha Vasquez  being transferred to [facility](unit) for routine progression of care       Report consisted of patients Situation, Background, Assessment and   Recommendations(SBAR). Information from the following report(s) SBAR, Kardex, ED Summary, Procedure Summary, Intake/Output, MAR, Accordion and Recent Results was reviewed with the receiving nurse. Lines:   Peripheral IV 07/01/18 Left Antecubital (Active)   Site Assessment Clean, dry, & intact 7/7/2018  8:00 AM   Phlebitis Assessment 0 7/7/2018  8:00 AM   Infiltration Assessment 0 7/7/2018  8:00 AM   Dressing Status Clean, dry, & intact 7/7/2018  8:00 AM   Dressing Type Transparent;Tape 7/7/2018  8:00 AM   Hub Color/Line Status Pink;Capped 7/7/2018  8:00 AM   Action Taken Open ports on tubing capped 7/7/2018  8:00 AM   Alcohol Cap Used Yes 7/7/2018  8:00 AM       Peripheral IV 07/03/18 Left Hand (Active)   Site Assessment Clean, dry, & intact 7/7/2018  8:00 AM   Phlebitis Assessment 0 7/7/2018  8:00 AM   Infiltration Assessment 0 7/7/2018  8:00 AM   Dressing Status Clean, dry, & intact 7/7/2018  8:00 AM   Dressing Type Transparent;Tape 7/7/2018  8:00 AM   Hub Color/Line Status Green;Capped 7/7/2018  8:00 AM   Action Taken Open ports on tubing capped 7/7/2018  8:00 AM   Alcohol Cap Used Yes 7/7/2018  8:00 AM        Opportunity for questions and clarification was provided.       Patient transported with:   Registered Nurse  Tech

## 2018-07-07 NOTE — PROGRESS NOTES
Hospitalist Progress Note  Jasmin Dyson MD  Answering service: [phone]       Date of Service:  2018  NAME:  Callie Poe  :  1965  MRN:  [id number]      Admission Summary:   50-year-old man without any significant past medical history who was in his usual state of health until about a week ago when the patient developed a headache. The headache is located at the back of the head, intermittent, a dull ache. No known aggravating or relieving factors. Before the onset of the headache, the patient was swimming in the swimming pool and also diving down into 12 foot depth. The headache did not start immediately, but the headache started later on in the evening. The patient has been having the headache intermittently since this swimming episode. On the day of presentation at the emergency room, the headache became associated with slurred speech. Because of that, the patient went to the Emergency Room at [facility]. When the patient arrived at the emergency room, a CT scan of the head was obtained. The CT scan shows a subdural hematoma with mass effect. Interval history / Subjective:   Alert. Right hand numbness has resolved  Left periorbital swelling increased,no vision problem,also left ear congested. He said he was told by neurosurgery this is expected. Assessment & Plan:     Subdural hematoma with mild brain compression and mild L to R sub falcine shift. On Keppra. Completed decadron started by the neurosurgeon. Repeat CT scan of the head and CTA of the head did not show any interval changes.  -Underwent crani on 7/3  -Speech and R arm weakness improved  -Doing well    Elevated blood pressure,on Norvasc,increased lisinopril. Code status: Full  DVT prophylaxis: SCD    Care Plan discussed with:Patient and nurse.   Disposition: rehab evaluating him     Hospital Problems  Date Reviewed: 7/3/2018 Codes Class Noted POA    * (Principal)Subdural hematoma ([address] ICD-10-CM: I62.00  ICD-9-CM: 432.1  7/1/2018 Yes                Review of Systems:   A comprehensive review of systems was negative. Vital Signs:    Last 24hrs VS reviewed since prior progress note. Most recent are:  Visit Vitals    /87    Pulse (!) 54    Temp 97.8 °F (36.6 °C)    Resp 15    Ht 6' 3\" (1.905 m)    Wt 97.8 kg (215 lb 11.2 oz)    SpO2 95%    BMI 26.96 kg/m2       No intake or output data in the 24 hours ending 07/07/18 1051     Physical Examination:             Constitutional:  No acute distress, cooperative   ENT:  Left periorbital ecchymosis. Oral mucous moist, oropharynx benign. Neck supple,    Resp:  CTA bilaterally. No wheezing/rhonchi/rales. No accessory muscle use   CV:  Regular rhythm, normal rate, no murmurs, gallops, rubs    GI:  Soft, non distended, non tender. normoactive bowel sounds, no hepatosplenomegaly     Musculoskeletal:  No edema, warm, 2+ pulses throughout    Neurologic: RUE resolved. Speech normal.Alert and oriented x4. Data Review:    Review and/or order of clinical lab test      Labs:     Recent Labs      07/06/18   0322   WBC  16.8*   HGB  13.3   HCT  38.7   PLT  228     Recent Labs      07/06/18   0554   NA  139   K  3.7   CL  105   CO2  24   BUN  23*   CREA  0.76   GLU  122*   CA  8.4*   MG  2.3   PHOS  3.6     Recent Labs      07/06/18   0554   SGOT  14*   ALT  32   AP  86   TBILI  1.1*   TP  6.4   ALB  3.3*   GLOB  3.1     No results for input(s): INR, PTP, APTT in the last 72 hours. No lab exists for component: INREXT, INREXT   No results for input(s): FE, TIBC, PSAT, FERR in the last 72 hours. No results found for: FOL, RBCF   No results for input(s): PH, PCO2, PO2 in the last 72 hours. No results for input(s): CPK, CKNDX, TROIQ in the last 72 hours.     No lab exists for component: CPKMB  Lab Results   Component Value Date/Time    Cholesterol, total 147 07/02/2018 02:42 AM HDL Cholesterol 42 07/02/2018 02:42 AM    LDL, calculated 89.8 07/02/2018 02:42 AM    Triglyceride 76 07/02/2018 02:42 AM    CHOL/HDL Ratio 3.5 07/02/2018 02:42 AM     Lab Results   Component Value Date/Time    Glucose (POC) 105 (H) 07/07/2018 06:33 AM    Glucose (POC) 116 (H) 07/07/2018 12:34 AM    Glucose (POC) 150 (H) 07/06/2018 06:13 PM    Glucose (POC) 122 (H) 07/06/2018 11:50 AM    Glucose (POC) 121 (H) 07/06/2018 07:17 AM     No results found for: COLOR, APPRN, SPGRU, REFSG, COLETTE, PROTU, GLUCU, KETU, BILU, UROU, MARGARETTE, LEUKU, GLUKE, EPSU, BACTU, WBCU, RBCU, CASTS, UCRY      Medications Reviewed:     Current Facility-Administered Medications   Medication Dose Route Frequency    labetalol (NORMODYNE;TRANDATE) injection 10 mg  10 mg IntraVENous Q4H PRN    Or    hydrALAZINE (APRESOLINE) 20 mg/mL injection 10 mg  10 mg IntraVENous Q4H PRN    lisinopril (PRINIVIL, ZESTRIL) tablet 20 mg  20 mg Oral DAILY    levETIRAcetam (KEPPRA) tablet 1,000 mg  1,000 mg Oral Q12H    famotidine (PEPCID) tablet 20 mg  20 mg Oral Q12H    sodium chloride (NS) flush 5-10 mL  5-10 mL IntraVENous Q8H    sodium chloride (NS) flush 5-10 mL  5-10 mL IntraVENous PRN    acetaminophen (TYLENOL) tablet 650 mg  650 mg Oral Q4H PRN    oxyCODONE-acetaminophen (PERCOCET) 5-325 mg per tablet 1 Tab  1 Tab Oral Q4H PRN    HYDROmorphone (DILAUDID) syringe 1 mg  1 mg IntraVENous Q3H PRN    ondansetron (ZOFRAN) injection 4 mg  4 mg IntraVENous Q4H PRN    docusate sodium (COLACE) capsule 100 mg  100 mg Oral BID    HYDROmorphone (DILAUDID) tablet 2 mg  2 mg Oral Q4H PRN    insulin regular (NOVOLIN R, HUMULIN R) injection   SubCUTAneous Q6H    glucose chewable tablet 16 g  4 Tab Oral PRN    dextrose (D50W) injection syrg 12.5-25 g  12.5-25 g IntraVENous PRN    glucagon (GLUCAGEN) injection 1 mg  1 mg IntraMUSCular PRN    amLODIPine (NORVASC) tablet 10 mg  10 mg Oral DAILY    bisacodyl (DULCOLAX) tablet 5 mg  5 mg Oral DAILY PRN ______________________________________________________________________  EXPECTED LENGTH OF STAY: 7d 0h  ACTUAL LENGTH OF STAY:          6                 Debbie Sears MD

## 2018-07-07 NOTE — ROUTINE PROCESS
Bedside shift change report given to [facility] (oncoming nurse) by Myles Villegas (offgoing nurse). Report included the following information SBAR, Kardex, Accordion and Cardiac Rhythm NSR.

## 2018-07-08 PROCEDURE — 74011250637 HC RX REV CODE- 250/637: Performed by: NEUROLOGICAL SURGERY

## 2018-07-08 PROCEDURE — 74011250637 HC RX REV CODE- 250/637: Performed by: HOSPITALIST

## 2018-07-08 PROCEDURE — 65660000000 HC RM CCU STEPDOWN

## 2018-07-08 PROCEDURE — 74011250636 HC RX REV CODE- 250/636: Performed by: NURSE PRACTITIONER

## 2018-07-08 PROCEDURE — 74011250637 HC RX REV CODE- 250/637: Performed by: NURSE PRACTITIONER

## 2018-07-08 RX ORDER — CLONIDINE HYDROCHLORIDE 0.1 MG/1
0.1 TABLET ORAL
Status: DISCONTINUED | OUTPATIENT
Start: 2018-07-08 | End: 2018-07-11 | Stop reason: HOSPADM

## 2018-07-08 RX ADMIN — CLONIDINE HYDROCHLORIDE 0.1 MG: 0.1 TABLET ORAL at 18:58

## 2018-07-08 RX ADMIN — AMLODIPINE BESYLATE 10 MG: 5 TABLET ORAL at 08:40

## 2018-07-08 RX ADMIN — OXYCODONE HYDROCHLORIDE AND ACETAMINOPHEN 1 TABLET: 5; 325 TABLET ORAL at 08:41

## 2018-07-08 RX ADMIN — DOCUSATE SODIUM 100 MG: 100 CAPSULE, LIQUID FILLED ORAL at 18:58

## 2018-07-08 RX ADMIN — DOCUSATE SODIUM 100 MG: 100 CAPSULE, LIQUID FILLED ORAL at 08:40

## 2018-07-08 RX ADMIN — FAMOTIDINE 20 MG: 20 TABLET ORAL at 22:22

## 2018-07-08 RX ADMIN — OXYCODONE HYDROCHLORIDE AND ACETAMINOPHEN 1 TABLET: 5; 325 TABLET ORAL at 22:26

## 2018-07-08 RX ADMIN — OXYCODONE HYDROCHLORIDE AND ACETAMINOPHEN 1 TABLET: 5; 325 TABLET ORAL at 03:06

## 2018-07-08 RX ADMIN — LEVETIRACETAM 1000 MG: 500 TABLET ORAL at 08:39

## 2018-07-08 RX ADMIN — LISINOPRIL 40 MG: 20 TABLET ORAL at 08:41

## 2018-07-08 RX ADMIN — HYDRALAZINE HYDROCHLORIDE 10 MG: 20 INJECTION INTRAMUSCULAR; INTRAVENOUS at 10:21

## 2018-07-08 RX ADMIN — OXYCODONE HYDROCHLORIDE AND ACETAMINOPHEN 1 TABLET: 5; 325 TABLET ORAL at 16:19

## 2018-07-08 RX ADMIN — FAMOTIDINE 20 MG: 20 TABLET ORAL at 08:41

## 2018-07-08 RX ADMIN — HYDRALAZINE HYDROCHLORIDE 10 MG: 20 INJECTION INTRAMUSCULAR; INTRAVENOUS at 03:08

## 2018-07-08 RX ADMIN — Medication 10 ML: at 22:22

## 2018-07-08 RX ADMIN — LEVETIRACETAM 1000 MG: 500 TABLET ORAL at 22:22

## 2018-07-08 RX ADMIN — Medication 10 ML: at 15:00

## 2018-07-08 NOTE — PROGRESS NOTES
Hospitalist Progress Note  Iesha Lucas MD  Answering service: [phone]       Date of Service:  2018  NAME:  Rene Thurman  :  1965  MRN:  [id number]      Admission Summary:   68-year-old man without any significant past medical history who was in his usual state of health until about a week ago when the patient developed a headache. The headache is located at the back of the head, intermittent, a dull ache. No known aggravating or relieving factors. Before the onset of the headache, the patient was swimming in the swimming pool and also diving down into 12 foot depth. The headache did not start immediately, but the headache started later on in the evening. The patient has been having the headache intermittently since this swimming episode. On the day of presentation at the emergency room, the headache became associated with slurred speech. Because of that, the patient went to the Emergency Room at [place]. When the patient arrived at the emergency room, a CT scan of the head was obtained. The CT scan shows a subdural hematoma with mass effect. Interval history / Subjective:   Mr Efrain Winter is doing well. He was walking on the hallway with his wife. No focal weakness and speech has normalized. Assessment & Plan:     Subdural hematoma with mild brain compression and mild L to R sub falcine shift. On Keppra. Completed decadron started by the neurosurgeon. Repeat CT scan of the head and CTA of the head did not show any interval changes.  -Underwent crani on 7/3  -Speech and R arm weakness improved  -Doing well    Hypertension ,likely undiagnosed preadmission as he has not been to a doctor for a while:on Norvasc,increased lisinopril to 40 mg.Prn clonidine added. Drainage from the Hemovac exit site reported last night. No discharge or drain now. Seen by NS earlier. Surgical wound clean and intact.     Code status: Full  DVT prophylaxis: SCD    Care Plan discussed with:Patient and nurse. Disposition:     He is doing well,walking on the hallway fine. I will ask PT and OT,speech reevaluation tomorrow if he is safe for home discharge vs rehabilitation. Hospital Problems  Date Reviewed: 7/3/2018          Codes Class Noted POA    * (Principal)Subdural hematoma ([facility] [address] ICD-10-CM: I62.00  ICD-9-CM: 432.1  7/1/2018 Yes                Review of Systems:   A comprehensive review of systems was negative. Vital Signs:    Last 24hrs VS reviewed since prior progress note. Most recent are:  Visit Vitals    /87 (BP 1 Location: Right arm, BP Patient Position: At rest;Supine; Head of bed elevated (Comment degrees))    Pulse 67    Temp 98.2 °F (36.8 °C)    Resp 16    Ht 6' 3\" (1.905 m)    Wt 97.4 kg (214 lb 11.2 oz)    SpO2 97%    BMI 26.84 kg/m2       No intake or output data in the 24 hours ending 07/08/18 1518     Physical Examination:             Constitutional:  No acute distress, cooperative   ENT:  Left periorbital ecchymosis. Oral mucous moist, oropharynx benign. Neck supple,    Resp:  CTA bilaterally. No wheezing/rhonchi/rales. No accessory muscle use   CV:  Regular rhythm, normal rate, no murmurs, gallops, rubs    GI:  Soft, non distended, non tender. normoactive bowel sounds, no hepatosplenomegaly     Musculoskeletal:  No edema, warm, 2+ pulses throughout    Neurologic: RUE resolved. Speech normal.Alert and oriented x4. Data Review:    Review and/or order of clinical lab test      Labs:     Recent Labs      07/06/18   0322   WBC  16.8*   HGB  13.3   HCT  38.7   PLT  228     Recent Labs      07/06/18   0554   NA  139   K  3.7   CL  105   CO2  24   BUN  23*   CREA  0.76   GLU  122*   CA  8.4*   MG  2.3   PHOS  3.6     Recent Labs      07/06/18   0554   SGOT  14*   ALT  32   AP  86   TBILI  1.1*   TP  6.4   ALB  3.3*   GLOB  3.1     No results for input(s): INR, PTP, APTT in the last 72 hours.     No lab exists for component: INREXT, INREXT   No results for input(s): FE, TIBC, PSAT, FERR in the last 72 hours. No results found for: FOL, RBCF   No results for input(s): PH, PCO2, PO2 in the last 72 hours. No results for input(s): CPK, CKNDX, TROIQ in the last 72 hours.     No lab exists for component: CPKMB  Lab Results   Component Value Date/Time    Cholesterol, total 147 07/02/2018 02:42 AM    HDL Cholesterol 42 07/02/2018 02:42 AM    LDL, calculated 89.8 07/02/2018 02:42 AM    Triglyceride 76 07/02/2018 02:42 AM    CHOL/HDL Ratio 3.5 07/02/2018 02:42 AM     Lab Results   Component Value Date/Time    Glucose (POC) 117 (H) 07/07/2018 11:31 AM    Glucose (POC) 105 (H) 07/07/2018 06:33 AM    Glucose (POC) 116 (H) 07/07/2018 12:34 AM    Glucose (POC) 150 (H) 07/06/2018 06:13 PM    Glucose (POC) 122 (H) 07/06/2018 11:50 AM     No results found for: COLOR, APPRN, SPGRU, REFSG, COLETTE, PROTU, GLUCU, KETU, BILU, UROU, MARGARETTE, LEUKU, GLUKE, EPSU, BACTU, WBCU, RBCU, CASTS, UCRY      Medications Reviewed:     Current Facility-Administered Medications   Medication Dose Route Frequency    lisinopril (PRINIVIL, ZESTRIL) tablet 40 mg  40 mg Oral DAILY    labetalol (NORMODYNE;TRANDATE) injection 10 mg  10 mg IntraVENous Q4H PRN    Or    hydrALAZINE (APRESOLINE) 20 mg/mL injection 10 mg  10 mg IntraVENous Q4H PRN    levETIRAcetam (KEPPRA) tablet 1,000 mg  1,000 mg Oral Q12H    famotidine (PEPCID) tablet 20 mg  20 mg Oral Q12H    sodium chloride (NS) flush 5-10 mL  5-10 mL IntraVENous Q8H    sodium chloride (NS) flush 5-10 mL  5-10 mL IntraVENous PRN    acetaminophen (TYLENOL) tablet 650 mg  650 mg Oral Q4H PRN    oxyCODONE-acetaminophen (PERCOCET) 5-325 mg per tablet 1 Tab  1 Tab Oral Q4H PRN    HYDROmorphone (DILAUDID) syringe 1 mg  1 mg IntraVENous Q3H PRN    ondansetron (ZOFRAN) injection 4 mg  4 mg IntraVENous Q4H PRN    docusate sodium (COLACE) capsule 100 mg  100 mg Oral BID    HYDROmorphone (DILAUDID) tablet 2 mg 2 mg Oral Q4H PRN    glucose chewable tablet 16 g  4 Tab Oral PRN    dextrose (D50W) injection syrg 12.5-25 g  12.5-25 g IntraVENous PRN    glucagon (GLUCAGEN) injection 1 mg  1 mg IntraMUSCular PRN    amLODIPine (NORVASC) tablet 10 mg  10 mg Oral DAILY    bisacodyl (DULCOLAX) tablet 5 mg  5 mg Oral DAILY PRN     ______________________________________________________________________  EXPECTED LENGTH OF STAY: 7d 0h  ACTUAL LENGTH OF STAY:          7                 Indira Concepcion MD

## 2018-07-08 NOTE — PROGRESS NOTES
[id number] Patient called out saying he thought he was bleeding. Patient has a suture that was placed 7/5 by PITER Orantes to assist in stopping some drainage. Has been effective until this morning. Will page MD    [phone] Paged MD on call    [phone] with MD on call, Loco Obrien. Asked if drainage looked like pus. Explained that it's pink tinged, same as description PITER Orantes put in her note. MD said he would stop by later today to see patient.

## 2018-07-08 NOTE — PROGRESS NOTES
Awake alert  Feels well  Incision healing well  Nurse called last night regarding minimal drainage from the prior drain site in scalp incision, it is clean and dry today  Good progress  To be evaluated tomorrow for possible rehab care post op  Continue to increase activity today

## 2018-07-08 NOTE — PROGRESS NOTES
Problem: Pressure Injury - Risk of  Goal: *Prevention of pressure injury  Document Erik Scale and appropriate interventions in the flowsheet.    Outcome: Progressing Towards Goal  Pressure Injury Interventions:  Sensory Interventions: Assess changes in LOC, Keep linens dry and wrinkle-free, Maintain/enhance activity level, Minimize linen layers, Pressure redistribution bed/mattress (bed type)         Activity Interventions: Increase time out of bed    Mobility Interventions: Pressure redistribution bed/mattress (bed type), PT/OT evaluation    Nutrition Interventions: Document food/fluid/supplement intake    Friction and Shear Interventions: Lift sheet, Minimize layers

## 2018-07-08 NOTE — PROGRESS NOTES
Bedside and Verbal shift change report given to Lakesha Tillman RN (oncoming nurse) by Feliciano Smart RN (offgoing nurse). Report included the following information SBAR, Kardex, Intake/Output, MAR, Recent Results and Cardiac Rhythm Normal Sinus Rythm.

## 2018-07-08 NOTE — PROGRESS NOTES
Problem: Falls - Risk of  Goal: *Absence of Falls  Document Isacc Fall Risk and appropriate interventions in the flowsheet.    Outcome: Progressing Towards Goal  Fall Risk Interventions:  Mobility Interventions: Patient to call before getting OOB    Mentation Interventions: Adequate sleep, hydration, pain control    Medication Interventions: Patient to call before getting OOB, Teach patient to arise slowly    Elimination Interventions: Call light in reach, Patient to call for help with toileting needs, Toileting schedule/hourly rounds    History of Falls Interventions: Consult care management for discharge planning

## 2018-07-09 LAB
BASOPHILS # BLD: 0 K/UL (ref 0–0.1)
BASOPHILS NFR BLD: 0 % (ref 0–1)
DIFFERENTIAL METHOD BLD: ABNORMAL
EOSINOPHIL # BLD: 0.6 K/UL (ref 0–0.4)
EOSINOPHIL NFR BLD: 5 % (ref 0–7)
ERYTHROCYTE [DISTWIDTH] IN BLOOD BY AUTOMATED COUNT: 12.5 % (ref 11.5–14.5)
HCT VFR BLD AUTO: 39.5 % (ref 36.6–50.3)
HGB BLD-MCNC: 13.7 G/DL (ref 12.1–17)
IMM GRANULOCYTES # BLD: 0.1 K/UL (ref 0–0.04)
IMM GRANULOCYTES NFR BLD AUTO: 1 % (ref 0–0.5)
LYMPHOCYTES # BLD: 2.6 K/UL (ref 0.8–3.5)
LYMPHOCYTES NFR BLD: 21 % (ref 12–49)
MCH RBC QN AUTO: 31 PG (ref 26–34)
MCHC RBC AUTO-ENTMCNC: 34.7 G/DL (ref 30–36.5)
MCV RBC AUTO: 89.4 FL (ref 80–99)
MONOCYTES # BLD: 1.5 K/UL (ref 0–1)
MONOCYTES NFR BLD: 12 % (ref 5–13)
NEUTS SEG # BLD: 7.5 K/UL (ref 1.8–8)
NEUTS SEG NFR BLD: 61 % (ref 32–75)
NRBC # BLD: 0 K/UL (ref 0–0.01)
NRBC BLD-RTO: 0 PER 100 WBC
PLATELET # BLD AUTO: 240 K/UL (ref 150–400)
PMV BLD AUTO: 9.9 FL (ref 8.9–12.9)
RBC # BLD AUTO: 4.42 M/UL (ref 4.1–5.7)
WBC # BLD AUTO: 12.3 K/UL (ref 4.1–11.1)

## 2018-07-09 PROCEDURE — 74011250637 HC RX REV CODE- 250/637: Performed by: NURSE PRACTITIONER

## 2018-07-09 PROCEDURE — 74011250637 HC RX REV CODE- 250/637: Performed by: HOSPITALIST

## 2018-07-09 PROCEDURE — 74011250637 HC RX REV CODE- 250/637: Performed by: NEUROLOGICAL SURGERY

## 2018-07-09 PROCEDURE — 97535 SELF CARE MNGMENT TRAINING: CPT

## 2018-07-09 PROCEDURE — 36415 COLL VENOUS BLD VENIPUNCTURE: CPT | Performed by: HOSPITALIST

## 2018-07-09 PROCEDURE — 65660000000 HC RM CCU STEPDOWN

## 2018-07-09 PROCEDURE — 85025 COMPLETE CBC W/AUTO DIFF WBC: CPT | Performed by: HOSPITALIST

## 2018-07-09 PROCEDURE — 74011250637 HC RX REV CODE- 250/637: Performed by: INTERNAL MEDICINE

## 2018-07-09 PROCEDURE — 97116 GAIT TRAINING THERAPY: CPT

## 2018-07-09 PROCEDURE — 92507 TX SP LANG VOICE COMM INDIV: CPT | Performed by: SPEECH-LANGUAGE PATHOLOGIST

## 2018-07-09 RX ORDER — HYDRALAZINE HYDROCHLORIDE 25 MG/1
25 TABLET, FILM COATED ORAL 3 TIMES DAILY
Status: DISCONTINUED | OUTPATIENT
Start: 2018-07-09 | End: 2018-07-10

## 2018-07-09 RX ADMIN — HYDRALAZINE HYDROCHLORIDE 25 MG: 25 TABLET, FILM COATED ORAL at 15:59

## 2018-07-09 RX ADMIN — CLONIDINE HYDROCHLORIDE 0.1 MG: 0.1 TABLET ORAL at 03:25

## 2018-07-09 RX ADMIN — LEVETIRACETAM 1000 MG: 500 TABLET ORAL at 08:40

## 2018-07-09 RX ADMIN — FAMOTIDINE 20 MG: 20 TABLET ORAL at 08:41

## 2018-07-09 RX ADMIN — DOCUSATE SODIUM 100 MG: 100 CAPSULE, LIQUID FILLED ORAL at 08:40

## 2018-07-09 RX ADMIN — DOCUSATE SODIUM 100 MG: 100 CAPSULE, LIQUID FILLED ORAL at 19:10

## 2018-07-09 RX ADMIN — Medication 10 ML: at 13:58

## 2018-07-09 RX ADMIN — ACETAMINOPHEN 650 MG: 325 TABLET ORAL at 20:48

## 2018-07-09 RX ADMIN — LISINOPRIL 40 MG: 20 TABLET ORAL at 08:41

## 2018-07-09 RX ADMIN — LEVETIRACETAM 1000 MG: 500 TABLET ORAL at 20:49

## 2018-07-09 RX ADMIN — Medication 10 ML: at 06:17

## 2018-07-09 RX ADMIN — AMLODIPINE BESYLATE 10 MG: 5 TABLET ORAL at 08:40

## 2018-07-09 RX ADMIN — OXYCODONE HYDROCHLORIDE AND ACETAMINOPHEN 1 TABLET: 5; 325 TABLET ORAL at 03:30

## 2018-07-09 RX ADMIN — HYDRALAZINE HYDROCHLORIDE 25 MG: 25 TABLET, FILM COATED ORAL at 11:03

## 2018-07-09 RX ADMIN — HYDRALAZINE HYDROCHLORIDE 25 MG: 25 TABLET, FILM COATED ORAL at 20:48

## 2018-07-09 RX ADMIN — BISACODYL 5 MG: 5 TABLET, COATED ORAL at 13:58

## 2018-07-09 NOTE — INTERDISCIPLINARY ROUNDS
IDR/SLIDR Summary          Patient: Mayi Martinez MRN: [id number]    Age: 47 y.o. YOB: 1965 Room/Bed: [facility]   Admit Diagnosis: Hemorragic stroke  Subdural hematoma (HCC)  LEFT FRONTAL TEMPORAL HEMATOMA  Principal Diagnosis: Subdural hematoma (HCC)   Goals: discharge  Readmission: NO  Quality Measure: Not applicable  VTE Prophylaxis: Mechanical  Influenza Vaccine screening completed? NO  Pneumococcal Vaccine screening completed? NO  Mobility needs: No   Nutrition plan:Yes  Consults:P.T, O.T. and Speech    Financial concerns:No  Escalated to CM? NO  RRAT Score: 5   Interventions:  Testing due for pt today?  NO  LOS: 8 days Expected length of stay 8 days  Discharge plan: home   PCP: None  Transportation needs: No    Days before discharge:ready for discharge  Discharge disposition: Home    Signed:     Samantha Jacob RN  7/9/2018  12:12 AM

## 2018-07-09 NOTE — PROGRESS NOTES
Neurosurgery Progress Note  Samanta Moore ACNP-BC  [phone]        Admit Date: 2018   LOS: 8 days        Daily Progress Note: 2018    POD: 6 Days Post-Op    S/P: Procedure(s):  LEFT FRONTAL TEMPORAL CRANIOTOMY FOR SUBDURAL HEMATOMA    HPI: The patient was apparently diving down to around 12 feet last week when he developed a headache shortly thereafter. He also developed difficulty with his speech that lasted about 10 minutes and then another one lasting about 2-3 minutes. He presented to the ER at [facility] where a head CT revealed a 6 mm SDH with some mass effect. The patient was transferred to [facility] for neurosurgical evaluation. The patient was observed in the ICU initially as his wife was hoping to avoid surgery. Unfortunately, on Tuesday night, the patient became more lethargic with worsening expressive aphasia and right sided weakness. The patient was taken to the OR emergently for evacuation of his SDH by Dr. Purvi Seay. Subjective:   No acute events overnight. Improving with therapies and now exploring option of going home instead of rehab. Denies chest pain, leg pain, nausea, vomiting, difficulty swallowing, and dyspnea.        Objective:     Vital signs  Temp (24hrs), Av.1 °F (36.7 °C), Min:97.9 °F (36.6 °C), Max:98.3 °F (36.8 °C)    07 -  1900  In: 120 [P.O.:120]  Out: -        Visit Vitals    /87 (BP 1 Location: Right arm, BP Patient Position: At rest)    Pulse 61    Temp 98.3 °F (36.8 °C)    Resp 13    Ht 6' 3\" (1.905 m)    Wt 96.2 kg (212 lb)    SpO2 98%    BMI 26.5 kg/m2    O2 Flow Rate (L/min): 2 l/min O2 Device: Room air     Pain control  Pain Assessment  Pain Scale 1: Numeric (0 - 10)  Pain Intensity 1: 1  Pain Onset 1: acute  Pain Location 1: Head  Pain Orientation 1: Left  Pain Description 1: Aching  Pain Intervention(s) 1: Declines    PT/OT  Gait     Gait  Base of Support: Narrowed, Shift to right  Speed/Zelda: Slow  Step Length: Right shortened, Left shortened  Gait Abnormalities: Decreased step clearance, Altered arm swing, Trunk sway increased (trunk sway to R)  Ambulation - Level of Assistance: Supervision  Distance (ft): 300 Feet (ft) (x 2)  Assistive Device: Gait belt  Stairs - Level of Assistance: Supervision  Number of Stairs Trained: 12           Physical Exam:  Gen:NAD. Neuro: A&Ox3. Follows commands. Speech mild expressive aphasia. Affect flat. PERRL. EOMI. Face symmetric. Palate Tongue midline. ESCOTO spontaneously. Strength 5/5 in LUE/LLE and 5-/5 LUE/LLE. Negative drift. Gait deferred. Skin: Incision left scalp C/D/I with staples in place. No erythema or drainage. Some edema under the left temple area where sutured was placed. CT head without contrast on 07/01/18 shows acute-subacute left subdural hematoma with mass effect including 6  mm contralateral subfalcine herniation. CT head without contrast on 07/04/18 shows the patient is status post left craniotomy with evacuation of an acute left subdural hematoma. There is air in the left subdural space. There is residual effacement of the underlying left sulci. The lateral ventricles are now slightly larger and the left to right shift has decreased to 2 mm. 24 hour results:    Recent Results (from the past 24 hour(s))   CBC WITH AUTOMATED DIFF    Collection Time: 07/09/18  3:20 AM   Result Value Ref Range    WBC 12.3 (H) 4.1 - 11.1 K/uL    RBC 4.42 4.10 - 5.70 M/uL    HGB 13.7 12.1 - 17.0 g/dL    HCT 39.5 36.6 - 50.3 %    MCV 89.4 80.0 - 99.0 FL    MCH 31.0 26.0 - 34.0 PG    MCHC 34.7 30.0 - 36.5 g/dL    RDW 12.5 11.5 - 14.5 %    PLATELET 128 712 - 913 K/uL    MPV 9.9 8.9 - 12.9 FL    NRBC 0.0 0  WBC    ABSOLUTE NRBC 0.00 0.00 - 0.01 K/uL    NEUTROPHILS 61 32 - 75 %    LYMPHOCYTES 21 12 - 49 %    MONOCYTES 12 5 - 13 %    EOSINOPHILS 5 0 - 7 %    BASOPHILS 0 0 - 1 %    IMMATURE GRANULOCYTES 1 (H) 0.0 - 0.5 %    ABS. NEUTROPHILS 7.5 1.8 - 8.0 K/UL    ABS.  LYMPHOCYTES 2.6 0.8 - 3.5 K/UL    ABS. MONOCYTES 1.5 (H) 0.0 - 1.0 K/UL    ABS. EOSINOPHILS 0.6 (H) 0.0 - 0.4 K/UL    ABS. BASOPHILS 0.0 0.0 - 0.1 K/UL    ABS. IMM. GRANS. 0.1 (H) 0.00 - 0.04 K/UL    DF AUTOMATED            Assessment:     Principal Problem:    Subdural hematoma (HCC) (7/1/2018)        Plan:   1. Subdural hematoma   - s/p crani 07/03   - neuro checks q4h   - decadron completed   - keppra for sz ppx   - pepcid for GI ppx   - PT/OT/Speech   - ok to d/c to home from [address] [place] [address] standpoint with f/u in 1 week for staple removal  2. Brain compression   - due to #1   - plans as above  3. Acute encephalopathy   - due to #1, 2   - plans as above  4. HTN   - SBP<160   - Cont Norvasc and Lisinopril   - Hydralazine/labetalol PRN   - Hospitalist/intensivist following  5. Hyperglycemia, steroid-induced   - SSI and accu-checks   - Hospitalist following  6. Leukocytosis   - WBC 16.8 (downtrending)   - Likely due to steroids and inflammation from surgery   - Afebrile   - Cont to monitor    Activity: up with assist  DVT ppx: SCDs  Dispo: rehab    Plan d/w Dr. Mike Hernandez. D/C home on Keppra. F/U in office in 1 week for staple removal. D/C instructions on chart.       Walter Marte, PITER

## 2018-07-09 NOTE — PROGRESS NOTES
Problem: Mobility Impaired (Adult and Pediatric)  Goal: *Acute Goals and Plan of Care (Insert Text)  Physical Therapy Goals  Initiated 7/4/2018  1. Patient will move from supine to sit and sit to supine , scoot up and down and roll side to side in bed with independence within 7 day(s). 2.  Patient will transfer from bed to chair and chair to bed with supervision/set-up using the least restrictive device within 7 day(s). 3.  Patient will perform sit to stand with supervision/set-up within 7 day(s). 4.  Patient will ambulate with minimal assistance/contact guard assist for 100 feet with the least restrictive device within 7 day(s). 5.  Patient will ascend/descend 3 stairs with 1 handrail(s) with supervision/set-up within 7 day(s). 6.  Patient will improve Lorenzana Balance score by 7 points within 7 days. physical Therapy TREATMENT  Patient: Hunter Denise (84 y.o. male)  Date: 7/9/2018  Diagnosis: Hemorragic stroke  Subdural hematoma (HCC)  LEFT FRONTAL TEMPORAL HEMATOMA Subdural hematoma (HCC)  Procedure(s) (LRB):  LEFT FRONTAL TEMPORAL CRANIOTOMY FOR SUBDURAL HEMATOMA (Left) 6 Days Post-Op  Precautions: Fall, Seizure  Chart, physical therapy assessment, plan of care and goals were reviewed. ASSESSMENT:  Patient cleared by RN for continued mobility with PT. Patient received in room eager to participate. He reports having a good weekend and is making progress. Speech seems improved and response time quicker. Patient able to ambulate 2 laps in hallway and ascend/descend steps with supervision. At this point, feel patient would be able to d/c home and resume physical therapy in outpatient setting. Returned to room and seated in chair. RN notified.    Progression toward goals:  [x]    Improving appropriately and progressing toward goals  []    Improving slowly and progressing toward goals  []    Not making progress toward goals and plan of care will be adjusted     PLAN:  Patient continues to benefit from skilled intervention to address the above impairments. Continue treatment per established plan of care. Discharge Recommendations:  Outpatient  Further Equipment Recommendations for Discharge:  tbd     SUBJECTIVE:   Patient stated Melisa Katz got better.     OBJECTIVE DATA SUMMARY:   Critical Behavior:  Neurologic State: Alert, Appropriate for age  Orientation Level: Oriented to person, Oriented to place, Oriented to situation, Oriented to time  Cognition: Appropriate decision making, Appropriate for age attention/concentration, Appropriate safety awareness, Follows commands  Safety/Judgement: Awareness of environment  Functional Mobility Training:  Bed Mobility:                    Transfers:  Sit to Stand: Supervision  Stand to Sit: Supervision                             Balance:  Sitting: Intact  Standing: Intact  Standing - Static: Good  Standing - Dynamic : Good  Ambulation/Gait Training:  Distance (ft): 300 Feet (ft) (x 2)  Assistive Device: Gait belt  Ambulation - Level of Assistance: Supervision                                        Stairs:  Number of Stairs Trained: 12  Stairs - Level of Assistance: Supervision        Pain:  Pain Scale 1: Numeric (0 - 10)  Pain Intensity 1: 1  Pain Location 1: Head  Pain Orientation 1: Left  Pain Description 1: Aching  Pain Intervention(s) 1: Declines  Activity Tolerance:   VSS  Please refer to the flowsheet for vital signs taken during this treatment.   After treatment:   [x]    Patient left in no apparent distress sitting up in chair  []    Patient left in no apparent distress in bed  [x]    Call bell left within reach  [x]    Nursing notified  [x]    Caregiver present  []    Bed alarm activated    COMMUNICATION/COLLABORATION:   The patients plan of care was discussed with: Occupational Therapist and Registered Nurse    Slim Mondragon, PT, DPT   Time Calculation: 15 mins

## 2018-07-09 NOTE — PROGRESS NOTES
Occupational Therapy: Wife came to door of patient's room and states patient has been having trouble having a bowel movement and is currently on toilet and attempting to do so. This therapist asked wife if she sees changes in patient's thinking, processing, sequencing, memory. She states she did see these changes initially, but does not see problems with these things at this time. Noted that patient has been walking in [place] with wife intermittently throughout the day. Note PT and SLP now recommending outpatient. If unable to see patient this afternoon, recommend outpatient OT with new plan for discharge home with wife. Will see as able and appropriate.   MAYANK Dominguez/JULIÁN

## 2018-07-09 NOTE — PROGRESS NOTES
Chart reviewed, events noted. We will continue to follow patient and will see him later today. Note question in MD note if he is appropriate for home discharge: based on functional levels when last seen, no concerns re: level of intensity with home health speech vs inpatient. I do recommend a full home health SLP evaluation, even if speech appears normal in conversation, to assess him at a high level and maximize skills for return to work.       Margaret Fagan M.S., CCC-SLP

## 2018-07-09 NOTE — PROGRESS NOTES
Problem: Communication Impaired (Adult)  Goal: *Speech Goal: (INSERT TEXT)  Speech Therapy Goals  Re-evaluation 7/4/2018 s/p craniotomy  1. Patient will follow 2 step commands with 80% accuracy given min cues within 7 days  2. Patient will complete open ended and verb sentence completion tasks with 80% accuracy given min cues within 7 days  3. Patient will complete moderate-complex responsive naming tasks with 80% accuracy given min cues within 7 days    Initiated 7/3/2018  Patient will, within 7 days:  1. Follow 2 step commands 50%. Continue  2. Answer yes/no simple questions 70%. MET  3. Complete automatic sentences 75%. MET   Speech language pathology treatment  Patient: Hugh Mc (76 y.o. male)  Date: 7/9/2018  Diagnosis: Hemorragic stroke  Subdural hematoma (HCC)  LEFT FRONTAL TEMPORAL HEMATOMA Subdural hematoma (HCC)  Procedure(s) (LRB):  LEFT FRONTAL TEMPORAL CRANIOTOMY FOR SUBDURAL HEMATOMA (Left) 6 Days Post-Op    ASSESSMENT:  Patient continues with excellent progress. Speech is mildly slow and effortful, with mild difficulty remembering unrelated words, some distractibility. Could benefit from further eval of higher level cognitive skills. Progression toward goals:  [x]       Improving appropriately and progressing toward goals  []       Improving slowly and progressing toward goals  []       Not making progress toward goals and plan of care will be adjusted     PLAN:  1. More detailed cog workup  2. Wife given activities to work on at home including language apps  Patient continues to benefit from skilled intervention to address the above impairments. Continue treatment per established plan of care.   Discharge Recommendations:  Home Health or Outpatient     SUBJECTIVE:   Patient stated he was excited to work towards discharge    OBJECTIVE:   Mental Status:  Neurologic State: Alert, Appropriate for age  Orientation Level: Oriented to person, Oriented to place, Oriented to situation, Oriented to time  Cognition: Appropriate decision making, Appropriate for age attention/concentration, Appropriate safety awareness, Follows commands  Perception: Appears intact  Perseveration: No perseveration noted  Safety/Judgement: Awareness of environment  Treatment & Interventions:   Motor Speech:                       Speech Characteristics: Word retrieval  Apraxic Characteristics: Visible/audible searching  Dysarthric Characteristics: Decreased rate  Interfering Components: Attention  Language Comprehension and Expression:     Verbal Expression     Initiation: No impairment  Automatic Speech Task: No impairment  Repetition: No impairment            Naming: No impairment        Divergent (%): 80 %                       Sentence Formulation: No impairment        Neuro-Linguistics:           Verbal Organization:  (80)  Thought Organization:  difficulty with proper nouns                           Response & Tolerance to Activities:     Pain:  Pain Scale 1: Numeric (0 - 10)  Pain Intensity 1: 1  Pain Location 1: Head  After treatment:   []       Patient left in no apparent distress sitting up in chair  []       Patient left in no apparent distress in bed  []       Call bell left within reach  []       Nursing notified  [x]       Caregiver present  []       Bed alarm activated    COMMUNICATION/COLLABORATION:       The patients plan of care was discussed with: Registered Nurse    BISHOP Michelle  Time Calculation: 18 mins

## 2018-07-09 NOTE — PROGRESS NOTES
Problem: Self Care Deficits Care Plan (Adult)  Goal: *Acute Goals and Plan of Care (Insert Text)  Occupational Therapy Goals  Initiated 7/4/2018    1. Patient will perform self-feeding using RUE as FM assists with AE PRN with Otoniel within 7 days. 2.  Patient will perform bathing standing at sink with CGA for balance and Otoniel tasks within 7 days. 3.  Patient will perform UB dressing, including FM aspects, with Otoniel within 7 days. 4.  Patient will perform BUE coordination tasks with Otoniel within 7 days. 5.  Patient will tolerate standing ADLs for 8 minutes with no LOB and CGA-SBA within 7 days. Occupational Therapy TREATMENT  Patient: Javi Peres (13 y.o. male)  Date: 7/9/2018  Diagnosis: Hemorragic stroke  Subdural hematoma (HCC)  LEFT FRONTAL TEMPORAL HEMATOMA Subdural hematoma (HCC)  Procedure(s) (LRB):  LEFT FRONTAL TEMPORAL CRANIOTOMY FOR SUBDURAL HEMATOMA (Left) 6 Days Post-Op  Precautions: Fall, Seizure  Chart, occupational therapy assessment, plan of care, and goals were reviewed. ASSESSMENT:  Patient received seated on EOB. Wife present. Patient demonstrating increased balance and ability to participate in self care. No visual deficits noted. Bilateral UE AROM and coordination WNL. Patient follows 100% of commands in appropriate amount of time and without cues. Patient appropriate for discharge to home with outpatient evaluation. Recommended wife supervise him until evaluations completed. Recommended he not drive until cleared by MD. Recommend Outpatient OT. Progression toward goals:  [x]       Improving appropriately and progressing toward goals  []       Improving slowly and progressing toward goals  []       Not making progress toward goals and plan of care will be adjusted     PLAN:  Patient continues to benefit from skilled intervention to address the above impairments. Continue treatment per established plan of care.   Discharge Recommendations:  Outpatient OT  Further Equipment Recommendations for Discharge:  none     SUBJECTIVE:   Patient stated I feel good.     OBJECTIVE DATA SUMMARY:   Cognitive/Behavioral Status:  Neurologic State: Alert  Orientation Level: Oriented X4  Cognition: Appropriate decision making; Appropriate for age attention/concentration; Appropriate safety awareness; Follows commands  Perception: Appears intact  Perseveration: No perseveration noted  Safety/Judgement: Awareness of environment    Functional Mobility and Transfers for ADLs:  Bed Mobility:       Transfers:  Sit to Stand: Modified independent  Functional Transfers  Toilet Transfer : Modified independent       Balance:  Sitting: Intact  Standing: Intact  Standing - Static: Good  Standing - Dynamic : Good    ADL Intervention:            Upper Body Bathing  Bathing Assistance: Modified independent (in simulation)  Position Performed: Seated edge of bed    Lower Body Bathing  Bathing Assistance: Modified independent (in simulation)  Perineal  : Modified independent  Position Performed: Standing  Lower Body : Modified independent  Position Performed: Seated edge of bed         Lower Body Dressing Assistance  Dressing Assistance: Modified independent  Pants With Elastic Waist: Modified independent (inferred from mobility)  Socks: Modified independent  Position Performed: Seated edge of bed;Standing (brings legs up to him)         Cognitive Retraining  Safety/Judgement: Awareness of environment    Activity Tolerance:   Fair to good  Please refer to the flowsheet for vital signs taken during this treatment.   After treatment:   [x] Patient left in no apparent distress sitting up in on EOB  [] Patient left in no apparent distress in bed  [x] Call bell left within reach  [x] Nursing notified  [x] Caregiver present  [] Bed alarm activated    COMMUNICATION/COLLABORATION:   The patients plan of care was discussed with: Registered Nurse    MAYANK Torres  Time Calculation: 37 mins

## 2018-07-09 NOTE — PROGRESS NOTES
CM spoke with attending. The PT is recommending outpatient PT. RX placed on chart for [facility] outpatient.  Julienne Conrad

## 2018-07-09 NOTE — PROGRESS NOTES
Hospitalist Progress Note  Jasmin Dyson MD  Answering service: [phone]       Date of Service:  2018  NAME:  Callie Poe  :  1965  MRN:  [id number]      Admission Summary:   67-year-old man without any significant past medical history who was in his usual state of health until about a week ago when the patient developed a headache. The headache is located at the back of the head, intermittent, a dull ache. No known aggravating or relieving factors. Before the onset of the headache, the patient was swimming in the swimming pool and also diving down into 12 foot depth. The headache did not start immediately, but the headache started later on in the evening. The patient has been having the headache intermittently since this swimming episode. On the day of presentation at the emergency room, the headache became associated with slurred speech. Because of that, the patient went to the Emergency Room at [place] [address]. When the patient arrived at the emergency room, a CT scan of the head was obtained. The CT scan shows a subdural hematoma with mass effect. Interval history / Subjective:   Mr Norm Figueroa  Has no headache,fever. He is said to have some oozing on the scalp wound at the site of the KAIA exit. No discharge at this point. The area feels boggy. Assessment & Plan:     Subdural hematoma with mild brain compression and mild L to R sub falcine shift. On Keppra. Completed decadron started by the neurosurgeon. Repeat CT scan of the head and CTA of the head did not show any interval changes.  -Underwent crani on 7/3  -Pending OT and PT reassessment to decide dispo: rehab vs home    Hypertension ,likely undiagnosed preadmission as he has not been to a doctor for a while:on Norvasc,increased lisinopril to 40 mg.Prn clonidine added.   -BP still not on target,added hydralazine 25 mg tid    Drainage from the Hemovac exit site reported last night. No discharge or drain now. Seen by NS earlier. Surgical wound clean and intact. Code status: Full  DVT prophylaxis: SCD    Care Plan discussed with:Patient and nurse. Disposition:     He is doing well,walking on the hallway fine,asked PT and OT reassessment  if he is safe for home discharge vs rehabilitation. Speech indicated okay for [facility] speech rx. Hospital Problems  Date Reviewed: 7/3/2018          Codes Class Noted POA    * (Principal)Subdural hematoma ([facility] [address] ICD-10-CM: I62.00  ICD-9-CM: 432.1  7/1/2018 Yes                Review of Systems:   A comprehensive review of systems was negative. Vital Signs:    Last 24hrs VS reviewed since prior progress note. Most recent are:  Visit Vitals    /83 (BP 1 Location: Left arm, BP Patient Position: Sitting)    Pulse 63    Temp 97.9 °F (36.6 °C)    Resp 12    Ht 6' 3\" (1.905 m)    Wt 96.2 kg (212 lb)    SpO2 97%    BMI 26.5 kg/m2       No intake or output data in the 24 hours ending 07/09/18 0913     Physical Examination:             Constitutional:  No acute distress, cooperative   ENT:  Left periorbital ecchymosis. Oral mucous moist, oropharynx benign. Neck supple,    Resp:  CTA bilaterally. No wheezing/rhonchi/rales. No accessory muscle use   CV:  Regular rhythm, normal rate, no murmurs, gallops, rubs    GI:  Soft, non distended, non tender. normoactive bowel sounds, no hepatosplenomegaly     Musculoskeletal:  No edema, warm, 2+ pulses throughout    Neurologic: RUE resolved. Speech normal.Alert and oriented x4. Data Review:    Review and/or order of clinical lab test      Labs:     Recent Labs      07/09/18   0320   WBC  12.3*   HGB  13.7   HCT  39.5   PLT  240     No results for input(s): NA, K, CL, CO2, BUN, CREA, GLU, CA, MG, PHOS, URICA in the last 72 hours. No results for input(s): SGOT, GPT, ALT, AP, TBIL, TBILI, TP, ALB, GLOB, GGT, AML, LPSE in the last 72 hours.     No lab exists for component: AMYP, HLPSE  No results for input(s): INR, PTP, APTT in the last 72 hours. No lab exists for component: INREXT, INREXT   No results for input(s): FE, TIBC, PSAT, FERR in the last 72 hours. No results found for: FOL, RBCF   No results for input(s): PH, PCO2, PO2 in the last 72 hours. No results for input(s): CPK, CKNDX, TROIQ in the last 72 hours.     No lab exists for component: CPKMB  Lab Results   Component Value Date/Time    Cholesterol, total 147 07/02/2018 02:42 AM    HDL Cholesterol 42 07/02/2018 02:42 AM    LDL, calculated 89.8 07/02/2018 02:42 AM    Triglyceride 76 07/02/2018 02:42 AM    CHOL/HDL Ratio 3.5 07/02/2018 02:42 AM     Lab Results   Component Value Date/Time    Glucose (POC) 117 (H) 07/07/2018 11:31 AM    Glucose (POC) 105 (H) 07/07/2018 06:33 AM    Glucose (POC) 116 (H) 07/07/2018 12:34 AM    Glucose (POC) 150 (H) 07/06/2018 06:13 PM    Glucose (POC) 122 (H) 07/06/2018 11:50 AM     No results found for: COLOR, APPRN, SPGRU, REFSG, COLETTE, PROTU, GLUCU, KETU, BILU, UROU, MARGARETTE, LEUKU, GLUKE, EPSU, BACTU, WBCU, RBCU, CASTS, UCRY      Medications Reviewed:     Current Facility-Administered Medications   Medication Dose Route Frequency    cloNIDine HCl (CATAPRES) tablet 0.1 mg  0.1 mg Oral Q6H PRN    lisinopril (PRINIVIL, ZESTRIL) tablet 40 mg  40 mg Oral DAILY    levETIRAcetam (KEPPRA) tablet 1,000 mg  1,000 mg Oral Q12H    famotidine (PEPCID) tablet 20 mg  20 mg Oral Q12H    sodium chloride (NS) flush 5-10 mL  5-10 mL IntraVENous Q8H    sodium chloride (NS) flush 5-10 mL  5-10 mL IntraVENous PRN    acetaminophen (TYLENOL) tablet 650 mg  650 mg Oral Q4H PRN    oxyCODONE-acetaminophen (PERCOCET) 5-325 mg per tablet 1 Tab  1 Tab Oral Q4H PRN    ondansetron (ZOFRAN) injection 4 mg  4 mg IntraVENous Q4H PRN    docusate sodium (COLACE) capsule 100 mg  100 mg Oral BID    HYDROmorphone (DILAUDID) tablet 2 mg  2 mg Oral Q4H PRN    glucose chewable tablet 16 g  4 Tab Oral PRN    dextrose (D50W) injection syrg 12.5-25 g  12.5-25 g IntraVENous PRN    glucagon (GLUCAGEN) injection 1 mg  1 mg IntraMUSCular PRN    amLODIPine (NORVASC) tablet 10 mg  10 mg Oral DAILY    bisacodyl (DULCOLAX) tablet 5 mg  5 mg Oral DAILY PRN     ______________________________________________________________________  EXPECTED LENGTH OF STAY: 7d 0h  ACTUAL LENGTH OF STAY:          8                 Khang Pierre MD

## 2018-07-09 NOTE — PROGRESS NOTES
0745 - Bedside and Verbal shift change report given to efrem nolasco (oncoming nurse) by Pelon Hillman (offgoing nurse). Report included the following information SBAR, Kardex, Intake/Output, MAR, Recent Results and Cardiac Rhythm SB.   1930 - Bedside and Verbal shift change report given to matt (oncoming nurse) by Sajan Pan (offgoing nurse). Report included the following information SBAR, Kardex, Intake/Output, MAR, Recent Results and Cardiac Rhythm NSR.

## 2018-07-10 PROCEDURE — 74011250637 HC RX REV CODE- 250/637: Performed by: HOSPITALIST

## 2018-07-10 PROCEDURE — 74011250637 HC RX REV CODE- 250/637: Performed by: NURSE PRACTITIONER

## 2018-07-10 PROCEDURE — 97110 THERAPEUTIC EXERCISES: CPT

## 2018-07-10 PROCEDURE — 65660000000 HC RM CCU STEPDOWN

## 2018-07-10 PROCEDURE — 74011250637 HC RX REV CODE- 250/637: Performed by: NEUROLOGICAL SURGERY

## 2018-07-10 PROCEDURE — 94760 N-INVAS EAR/PLS OXIMETRY 1: CPT

## 2018-07-10 PROCEDURE — 97116 GAIT TRAINING THERAPY: CPT

## 2018-07-10 PROCEDURE — 97112 NEUROMUSCULAR REEDUCATION: CPT

## 2018-07-10 RX ORDER — HYDRALAZINE HYDROCHLORIDE 50 MG/1
50 TABLET, FILM COATED ORAL 3 TIMES DAILY
Status: DISCONTINUED | OUTPATIENT
Start: 2018-07-10 | End: 2018-07-10

## 2018-07-10 RX ORDER — CARVEDILOL 6.25 MG/1
6.25 TABLET ORAL 2 TIMES DAILY WITH MEALS
Status: DISCONTINUED | OUTPATIENT
Start: 2018-07-10 | End: 2018-07-11 | Stop reason: HOSPADM

## 2018-07-10 RX ORDER — POLYVINYL ALCOHOL 14 MG/ML
1 SOLUTION/ DROPS OPHTHALMIC AS NEEDED
Status: DISCONTINUED | OUTPATIENT
Start: 2018-07-10 | End: 2018-07-11 | Stop reason: HOSPADM

## 2018-07-10 RX ORDER — HYDRALAZINE HYDROCHLORIDE 25 MG/1
25 TABLET, FILM COATED ORAL 3 TIMES DAILY
Status: DISCONTINUED | OUTPATIENT
Start: 2018-07-10 | End: 2018-07-11 | Stop reason: HOSPADM

## 2018-07-10 RX ADMIN — FAMOTIDINE 20 MG: 20 TABLET ORAL at 21:13

## 2018-07-10 RX ADMIN — HYDRALAZINE HYDROCHLORIDE 25 MG: 25 TABLET, FILM COATED ORAL at 08:52

## 2018-07-10 RX ADMIN — LISINOPRIL 40 MG: 20 TABLET ORAL at 08:51

## 2018-07-10 RX ADMIN — HYDRALAZINE HYDROCHLORIDE 25 MG: 25 TABLET, FILM COATED ORAL at 21:14

## 2018-07-10 RX ADMIN — DOCUSATE SODIUM 100 MG: 100 CAPSULE, LIQUID FILLED ORAL at 08:52

## 2018-07-10 RX ADMIN — Medication 10 ML: at 15:43

## 2018-07-10 RX ADMIN — LEVETIRACETAM 1000 MG: 500 TABLET ORAL at 21:13

## 2018-07-10 RX ADMIN — FAMOTIDINE 20 MG: 20 TABLET ORAL at 08:52

## 2018-07-10 RX ADMIN — ACETAMINOPHEN 650 MG: 325 TABLET ORAL at 04:06

## 2018-07-10 RX ADMIN — DOCUSATE SODIUM 100 MG: 100 CAPSULE, LIQUID FILLED ORAL at 18:38

## 2018-07-10 RX ADMIN — Medication 10 ML: at 21:14

## 2018-07-10 RX ADMIN — LEVETIRACETAM 1000 MG: 500 TABLET ORAL at 08:51

## 2018-07-10 RX ADMIN — CARVEDILOL 6.25 MG: 6.25 TABLET, FILM COATED ORAL at 21:13

## 2018-07-10 RX ADMIN — CLONIDINE HYDROCHLORIDE 0.1 MG: 0.1 TABLET ORAL at 03:31

## 2018-07-10 RX ADMIN — ACETAMINOPHEN 650 MG: 325 TABLET ORAL at 15:43

## 2018-07-10 RX ADMIN — HYDRALAZINE HYDROCHLORIDE 25 MG: 25 TABLET, FILM COATED ORAL at 15:42

## 2018-07-10 RX ADMIN — AMLODIPINE BESYLATE 10 MG: 5 TABLET ORAL at 08:52

## 2018-07-10 RX ADMIN — CARVEDILOL 6.25 MG: 6.25 TABLET, FILM COATED ORAL at 12:36

## 2018-07-10 RX ADMIN — ACETAMINOPHEN 650 MG: 325 TABLET ORAL at 21:13

## 2018-07-10 NOTE — PROGRESS NOTES
Problem: Mobility Impaired (Adult and Pediatric)  Goal: *Acute Goals and Plan of Care (Insert Text)  Physical Therapy Goals  Initiated 7/4/2018  1. Patient will move from supine to sit and sit to supine , scoot up and down and roll side to side in bed with independence within 7 day(s). 2.  Patient will transfer from bed to chair and chair to bed with supervision/set-up using the least restrictive device within 7 day(s). 3.  Patient will perform sit to stand with supervision/set-up within 7 day(s). 4.  Patient will ambulate with minimal assistance/contact guard assist for 100 feet with the least restrictive device within 7 day(s). 5.  Patient will ascend/descend 3 stairs with 1 handrail(s) with supervision/set-up within 7 day(s). 6.  Patient will improve Lorenzana Balance score by 7 points within 7 days. physical Therapy TREATMENT  Patient: Pauline Parra (2 y.o. male)  Date: 7/10/2018  Diagnosis: Hemorragic stroke  Subdural hematoma (HCC)  LEFT FRONTAL TEMPORAL HEMATOMA Subdural hematoma (HCC)  Procedure(s) (LRB):  LEFT FRONTAL TEMPORAL CRANIOTOMY FOR SUBDURAL HEMATOMA (Left) 7 Days Post-Op  Precautions: Fall, Seizure  Chart, physical therapy assessment, plan of care and goals were reviewed. ASSESSMENT:  Patient cleared by RN for continued mobility with PT. Patient received in room eager to participate. Patient able to ambulate 3 laps in hallway with no assistance. At this point, feel patient would be able to d/c home and resume physical therapy in outpatient setting vs none. Returned to room and seated in chair. RN notified. Progression toward goals:  [x]    Improving appropriately and progressing toward goals  []    Improving slowly and progressing toward goals  []    Not making progress toward goals and plan of care will be adjusted     PLAN:  Patient continues to benefit from skilled intervention to address the above impairments. Continue treatment per established plan of care.   Discharge Recommendations:  Outpatient and None  Further Equipment Recommendations for Discharge:  none     SUBJECTIVE:   Patient stated I can dance.     OBJECTIVE DATA SUMMARY:   Critical Behavior:  Neurologic State: Alert  Orientation Level: Oriented X4  Cognition: Appropriate safety awareness, Appropriate for age attention/concentration, Appropriate decision making  Safety/Judgement: Awareness of environment  Functional Mobility Training:  Bed Mobility:                    Transfers:  Sit to Stand: Independent  Stand to Sit: Independent                             Balance:  Sitting: Intact  Standing: Intact  Ambulation/Gait Training:  Distance (ft): 900 Feet (ft)  Assistive Device: Gait belt  Ambulation - Level of Assistance: Independent                                   Pain:  Pain Scale 1: Numeric (0 - 10)  Pain Intensity 1: 0              Activity Tolerance:   VSS  Please refer to the flowsheet for vital signs taken during this treatment.   After treatment:   [x]    Patient left in no apparent distress sitting up in chair  []    Patient left in no apparent distress in bed  [x]    Call bell left within reach  [x]    Nursing notified  [x]    Caregiver present  []    Bed alarm activated    COMMUNICATION/COLLABORATION:   The patients plan of care was discussed with: Occupational Therapist and Registered Nurse    David Clemens PT, WILLIE   Time Calculation: 11 mins

## 2018-07-10 NOTE — PROGRESS NOTES
NUTRITION- DIETETIC tECHnICIAN    Pt seen for:       [x]                  Rescreen  []                  Food preferences/tolerances  []                  Food Allergies  []                  PO intake check  []                  Supplements  []                  Diet order clarification  []                  Education  []                  Other     Rescreen:    [x]                  Not at Nutrition Risk, rescreen per screening protocol  []                  At Nutrition Risk- RD referral         SUBJECTIVE/OBJECTIVE:     Information obtained from:  patient      Diet:  Regular Cardiac 2 gm Na    Intake: good    No data found. Weight Changes:       Wt Readings from Last 5 Encounters:   07/10/18 94.2 kg (207 lb 10.8 oz)   07/01/18 97.5 kg (215 lb)       Problems Identified:      [x]                  No problems identified and eating well   []                  Specified food preferences   []                  Dislikes supplements              []                  Allergies:   []                  Difficulty chewing      []                  Dentition    []                  Nausea/Vomiting   []                  Constipation   []                  Diarrhea    PLAN:     [x]                   Continue current diet and encourage intake  []                   Obtained/adjusted food preferences/tolerances and/or snacks options   []                   Dislikes supplements will try a substitution  []                   Modify diet for food allergies  []                   Adjust texture due to difficulty chewing   []                   Educated patient  []                   RD Referral  [x]                   Rescreen per screening protocol          Izabel Parra DTR

## 2018-07-10 NOTE — PROGRESS NOTES
Problem: Self Care Deficits Care Plan (Adult)  Goal: *Acute Goals and Plan of Care (Insert Text)  Occupational Therapy Goals  Initiated 7/4/2018    1. Patient will perform self-feeding using RUE as FM assists with AE PRN with Otoniel within 7 days. 2.  Patient will perform bathing standing at sink with CGA for balance and Otoniel tasks within 7 days. 3.  Patient will perform UB dressing, including FM aspects, with Otoniel within 7 days. 4.  Patient will perform BUE coordination tasks with Otoniel within 7 days. 5.  Patient will tolerate standing ADLs for 8 minutes with no LOB and CGA-SBA within 7 days. Occupational Therapy TREATMENT: Neuro  Patient: Mayi Martinez (52 y.o. male)  Date: 7/10/2018  Diagnosis: Hemorragic stroke  Subdural hematoma (HCC)  LEFT FRONTAL TEMPORAL HEMATOMA Subdural hematoma (HCC)  Procedure(s) (LRB):  LEFT FRONTAL TEMPORAL CRANIOTOMY FOR SUBDURAL HEMATOMA (Left) @RRD  Precautions: Fall, Seizure  Chart, occupational therapy assessment, plan of care, and goals were reviewed. ASSESSMENT:  Nursing cleared for therapy. /98, discussed with nursing who cleared for light therapy. Provided basic hand AROM exercises with blue foam block for resistance with good understanding. He reports mild impairment in sensation in R dorsum and palmar area of hand which has been there this admission. Ed on sensation integration with good understanding. Patient denies any concerns with self feeding or dressing at this time. OP OT vs none as patient with increased function in R UE. Patient reporting mild blurriness in R eye which he is unsure when it started, approx mid morning. Able to read clock and menu. Denies diplopia. Discussed with nurse and neuro NP.       Progression toward goals:  [x]          Improving appropriately and progressing toward goals  []          Improving slowly and progressing toward goals  []          Not making progress toward goals and plan of care will be adjusted PLAN:  Patient continues to benefit from skilled intervention to address the above impairments. Continue treatment per established plan of care. Discharge Recommendations:  Outpatient vs None  Further Equipment Recommendations for Discharge:  None for OT     SUBJECTIVE:   Patient stated I feel ready to go home.     OBJECTIVE DATA SUMMARY:   Cognitive/Behavioral Status:  Neurologic State: Alert  Orientation Level: Oriented X4  Cognition: Appropriate decision making; Appropriate for age attention/concentration; Appropriate safety awareness             Functional Mobility and Transfers for ADLs:  Bed Mobility:       Transfers:  Sit to Stand: Independent           Balance:  Sitting: Intact  Standing: Intact    ADL Intervention:  Feeding  Feeding Assistance:  (reports indep with all aspects including container mgmt)      Neuro Re-Education:   Education on sensitization with R hand both palmar and dorsum aspects starting with soft texture and increasing to rough texture. Ed on continued use of R hand with all safe ADL tasks and safety with hot liquids secondary to concern with temperature sensation. Therapeutic Exercises:   Issued hand out with multiple exercises for ROM and addition of resistance through foam block. Completed opposition, finger flexion, finger extension at MCP, abduction/adduction, and stretching. Completed approx 5 reps of each. Ed on completion through out the day 10-20 reps. Patient reports excellent return with AROM, strength and coordination in RUE, denies any concerns. Pain:  Pain Scale 1: Numeric (0 - 10)  Pain Intensity 1: 0     Activity Tolerance:   /68 in supine. Discussed with nursing. All activity completed at bed level.      After treatment:   [] Patient left in no apparent distress sitting up in chair  [x] Patient left in no apparent distress in bed  [x] Call bell left within reach  [x] Nursing notified  [x] Caregiver present  [] Bed alarm activated    COMMUNICATION/EDUCATION:   The patients plan of care was discussed with: Physical Therapist, Registered Nurse and Patient. This patients plan of care is appropriate for delegation to JESSICA.     Thank you for this referral.  Ashely Melchor OT  Time Calculation: 26 mins

## 2018-07-10 NOTE — PROGRESS NOTES
Hospitalist Progress Note  Brandi Peterson MD  Answering service: [phone]       Date of Service:  7/10/2018  NAME:  Naa Parker  :  1965  MRN:  [id number]      Admission Summary:   76-year-old man without any significant past medical history who was in his usual state of health until about a week ago when the patient developed a headache. The headache is located at the back of the head, intermittent, a dull ache. No known aggravating or relieving factors. Before the onset of the headache, the patient was swimming in the swimming pool and also diving down into 12 foot depth. The headache did not start immediately, but the headache started later on in the evening. The patient has been having the headache intermittently since this swimming episode. On the day of presentation at the emergency room, the headache became associated with slurred speech. Because of that, the patient went to the Emergency Room at [facility]. When the patient arrived at the emergency room, a CT scan of the head was obtained. The CT scan shows a subdural hematoma with mass effect. Interval history / Subjective:   Mr Hood Delgado  Has no headache,fever. Assessment & Plan:     Subdural hematoma with mild brain compression and mild L to R sub falcine shift. On Keppra. Completed decadron started by the neurosurgeon. Repeat CT scan of the head and CTA of the head did not show any interval changes.  -Underwent crani on 7/3  -Pending OT and PT reassessment to decide dispo: rehab vs home    Hypertension ,likely undiagnosed preadmission as he has not been to a doctor for a while:on Norvasc,increased lisinopril to 40 mg.Prn clonidine added. -BP still not on target,added hydralazine 25 mg tid, still high added coreg. Surgical wound clean and intact.     Code status: Full  DVT prophylaxis: SCD    Care Plan discussed with:Patient and nurse.  Disposition:     He is doing well,walking on the hallway fine,asked PT and OT reassessment  if he is safe for home discharge vs rehabilitation. Speech indicated okay for [place] speech rx. Hospital Problems  Date Reviewed: 7/3/2018          Codes Class Noted POA    * (Principal)Subdural hematoma ([facility] ICD-10-CM: I62.00  ICD-9-CM: 432.1  7/1/2018 Yes                Review of Systems:   A comprehensive review of systems was negative. Vital Signs:    Last 24hrs VS reviewed since prior progress note. Most recent are:  Visit Vitals    BP (!) 156/95    Pulse 62    Temp 98.3 °F (36.8 °C)    Resp 12    Ht 6' 3\" (1.905 m)    Wt 94.2 kg (207 lb 10.8 oz)    SpO2 96%    BMI 25.96 kg/m2       No intake or output data in the 24 hours ending 07/10/18 1959     Physical Examination:             Constitutional:  No acute distress, cooperative   ENT:  Left periorbital ecchymosis. Oral mucous moist, oropharynx benign. Neck supple,    Resp:  CTA bilaterally. No wheezing/rhonchi/rales. No accessory muscle use   CV:  Regular rhythm, normal rate, no murmurs, gallops, rubs    GI:  Soft, non distended, non tender. normoactive bowel sounds, no hepatosplenomegaly     Musculoskeletal:  No edema, warm, 2+ pulses throughout    Neurologic: RUE resolved. Speech normal.Alert and oriented x4. Data Review:    Review and/or order of clinical lab test      Labs:     Recent Labs      07/09/18   0320   WBC  12.3*   HGB  13.7   HCT  39.5   PLT  240     No results for input(s): NA, K, CL, CO2, BUN, CREA, GLU, CA, MG, PHOS, URICA in the last 72 hours. No results for input(s): SGOT, GPT, ALT, AP, TBIL, TBILI, TP, ALB, GLOB, GGT, AML, LPSE in the last 72 hours. No lab exists for component: AMYP, HLPSE  No results for input(s): INR, PTP, APTT in the last 72 hours. No lab exists for component: INREXT, INREXT   No results for input(s): FE, TIBC, PSAT, FERR in the last 72 hours.    No results found for: FOL, RBCF   No results for input(s): PH, PCO2, PO2 in the last 72 hours. No results for input(s): CPK, CKNDX, TROIQ in the last 72 hours.     No lab exists for component: CPKMB  Lab Results   Component Value Date/Time    Cholesterol, total 147 07/02/2018 02:42 AM    HDL Cholesterol 42 07/02/2018 02:42 AM    LDL, calculated 89.8 07/02/2018 02:42 AM    Triglyceride 76 07/02/2018 02:42 AM    CHOL/HDL Ratio 3.5 07/02/2018 02:42 AM     Lab Results   Component Value Date/Time    Glucose (POC) 117 (H) 07/07/2018 11:31 AM    Glucose (POC) 105 (H) 07/07/2018 06:33 AM    Glucose (POC) 116 (H) 07/07/2018 12:34 AM    Glucose (POC) 150 (H) 07/06/2018 06:13 PM    Glucose (POC) 122 (H) 07/06/2018 11:50 AM     No results found for: COLOR, APPRN, SPGRU, REFSG, COLETTE, PROTU, GLUCU, KETU, BILU, UROU, MARGARETTE, LEUKU, GLUKE, EPSU, BACTU, WBCU, RBCU, CASTS, UCRY      Medications Reviewed:     Current Facility-Administered Medications   Medication Dose Route Frequency    hydrALAZINE (APRESOLINE) tablet 25 mg  25 mg Oral TID    carvedilol (COREG) tablet 6.25 mg  6.25 mg Oral BID WITH MEALS    polyvinyl alcohol (LIQUIFILM TEARS) 1.4 % ophthalmic solution 1 Drop  1 Drop Both Eyes PRN    cloNIDine HCl (CATAPRES) tablet 0.1 mg  0.1 mg Oral Q6H PRN    lisinopril (PRINIVIL, ZESTRIL) tablet 40 mg  40 mg Oral DAILY    levETIRAcetam (KEPPRA) tablet 1,000 mg  1,000 mg Oral Q12H    famotidine (PEPCID) tablet 20 mg  20 mg Oral Q12H    sodium chloride (NS) flush 5-10 mL  5-10 mL IntraVENous Q8H    sodium chloride (NS) flush 5-10 mL  5-10 mL IntraVENous PRN    acetaminophen (TYLENOL) tablet 650 mg  650 mg Oral Q4H PRN    oxyCODONE-acetaminophen (PERCOCET) 5-325 mg per tablet 1 Tab  1 Tab Oral Q4H PRN    ondansetron (ZOFRAN) injection 4 mg  4 mg IntraVENous Q4H PRN    docusate sodium (COLACE) capsule 100 mg  100 mg Oral BID    HYDROmorphone (DILAUDID) tablet 2 mg  2 mg Oral Q4H PRN    glucose chewable tablet 16 g  4 Tab Oral PRN    dextrose (D50W) injection syrg 12.5-25 g  12.5-25 g IntraVENous PRN    glucagon (GLUCAGEN) injection 1 mg  1 mg IntraMUSCular PRN    amLODIPine (NORVASC) tablet 10 mg  10 mg Oral DAILY    bisacodyl (DULCOLAX) tablet 5 mg  5 mg Oral DAILY PRN     ______________________________________________________________________  EXPECTED LENGTH OF STAY: 7d 0h  ACTUAL LENGTH OF STAY:          9                 Noni Wade MD

## 2018-07-10 NOTE — PROGRESS NOTES
Neurosurgery Progress Note  Steph Prince, [facility]  [phone]        Admit Date: 2018   LOS: 9 days        Daily Progress Note: 7/10/2018    POD: 7 Days Post-Op    S/P: Procedure(s):  LEFT FRONTAL TEMPORAL CRANIOTOMY FOR SUBDURAL HEMATOMA    HPI: The patient was apparently diving down to around 12 feet last week when he developed a headache shortly thereafter. He also developed difficulty with his speech that lasted about 10 minutes and then another one lasting about 2-3 minutes. He presented to the ER at [facility] where a head CT revealed a 6 mm SDH with some mass effect. The patient was transferred to [facility] for neurosurgical evaluation. The patient was observed in the ICU initially as his wife was hoping to avoid surgery. Unfortunately, on Tuesday night, the patient became more lethargic with worsening expressive aphasia and right sided weakness. The patient was taken to the OR emergently for evacuation of his SDH by Dr. Jeanna Shipman. Subjective:   BP elevated overnight. New meds added. Patient's wife ordered BP cuff for home. Complaints of blurry vision in left eye, not the right eye. This is likely due to swelling on that side from surgery still. Denies chest pain, leg pain, nausea, vomiting, difficulty swallowing, and dyspnea.        Objective:     Vital signs  Temp (24hrs), Av °F (36.7 °C), Min:97.6 °F (36.4 °C), Max:98.5 °F (36.9 °C)       1901 - 07/10 0700  In: 120 [P.O.:120]  Out: -     Visit Vitals    BP (!) 168/98  Comment: after coreg    Pulse 72    Temp 98.2 °F (36.8 °C)    Resp 16    Ht 6' 3\" (1.905 m)    Wt 94.2 kg (207 lb 10.8 oz)    SpO2 96%    BMI 25.96 kg/m2    O2 Flow Rate (L/min): 2 l/min O2 Device: Room air     Pain control  Pain Assessment  Pain Scale 1: Numeric (0 - 10)  Pain Intensity 1: 0  Pain Onset 1: acute  Pain Location 1: Head  Pain Orientation 1: Left  Pain Description 1: Aching  Pain Intervention(s) 1: Declines    PT/OT  Gait     Gait  Base of Support: Narrowed, Shift to right  Speed/Zelda: Slow  Step Length: Right shortened, Left shortened  Gait Abnormalities: Decreased step clearance, Altered arm swing, Trunk sway increased (trunk sway to R)  Ambulation - Level of Assistance: Independent  Distance (ft): 900 Feet (ft)  Assistive Device: Gait belt  Stairs - Level of Assistance: Supervision  Number of Stairs Trained: 12           Physical Exam:  Gen:NAD. Neuro: A&Ox3. Follows commands. Speech mild expressive aphasia. Affect flat. PERRL. EOMI. Face symmetric. Palate Tongue midline. ESCOTO spontaneously. Strength 5/5 in LUE/LLE and 5-/5 LUE/LLE. Negative drift. Gait deferred. Skin: Incision left scalp C/D/I with staples in place. No erythema or drainage. Some edema under the left temple area where sutured was placed. CT head without contrast on 07/01/18 shows acute-subacute left subdural hematoma with mass effect including 6  mm contralateral subfalcine herniation. CT head without contrast on 07/04/18 shows the patient is status post left craniotomy with evacuation of an acute left subdural hematoma. There is air in the left subdural space. There is residual effacement of the underlying left sulci. The lateral ventricles are now slightly larger and the left to right shift has decreased to 2 mm. 24 hour results:    No results found for this or any previous visit (from the past 24 hour(s)). Assessment:     Principal Problem:    Subdural hematoma ([address] (7/1/2018)        Plan:   1. Subdural hematoma   - s/p crani 07/03   - neuro checks q4h   - decadron completed   - keppra for sz ppx   - pepcid for GI ppx   - PT/OT/Speech   - ok to d/c to home from [address] [place] [address] standpoint with f/u in 1 week for staple removal. Appt on AVS.  2. Brain compression   - due to #1   - plans as above  3. Acute encephalopathy   - due to #1, 2   - plans as above  4. HTN   - SBP<160   - Cont Norvasc and Lisinopril   - Hydralazine/labetalol PRN   - Hospitalist/intensivist following  5. Hyperglycemia, steroid-induced   - SSI and accu-checks   - Hospitalist following  6. Leukocytosis   - WBC 16.8 (downtrending)   - Likely due to steroids and inflammation from surgery   - Afebrile   - Cont to monitor    Activity: up with assist  DVT ppx: SCDs  Dispo: rehab    Plan d/w Dr. Sravan Watson. D/C home on [place]. F/U in office in 1 week for staple removal. D/C instructions on chart.       Yehuda Garcia NP

## 2018-07-11 VITALS
SYSTOLIC BLOOD PRESSURE: 144 MMHG | HEART RATE: 65 BPM | BODY MASS INDEX: 25.54 KG/M2 | TEMPERATURE: 98.6 F | HEIGHT: 75 IN | WEIGHT: 205.4 LBS | DIASTOLIC BLOOD PRESSURE: 80 MMHG | OXYGEN SATURATION: 97 % | RESPIRATION RATE: 12 BRPM

## 2018-07-11 PROCEDURE — 74011250637 HC RX REV CODE- 250/637: Performed by: HOSPITALIST

## 2018-07-11 PROCEDURE — 74011250637 HC RX REV CODE- 250/637: Performed by: NEUROLOGICAL SURGERY

## 2018-07-11 PROCEDURE — 94762 N-INVAS EAR/PLS OXIMTRY CONT: CPT

## 2018-07-11 PROCEDURE — 74011250637 HC RX REV CODE- 250/637: Performed by: NURSE PRACTITIONER

## 2018-07-11 RX ORDER — LISINOPRIL 40 MG/1
40 TABLET ORAL DAILY
Qty: 30 TAB | Refills: 0 | Status: SHIPPED | OUTPATIENT
Start: 2018-07-12 | End: 2018-11-07 | Stop reason: SDUPTHER

## 2018-07-11 RX ORDER — CARVEDILOL 6.25 MG/1
6.25 TABLET ORAL 2 TIMES DAILY WITH MEALS
Qty: 30 TAB | Refills: 1 | Status: SHIPPED | OUTPATIENT
Start: 2018-07-11 | End: 2018-12-20

## 2018-07-11 RX ORDER — HYDRALAZINE HYDROCHLORIDE 25 MG/1
25 TABLET, FILM COATED ORAL 3 TIMES DAILY
Qty: 90 TAB | Refills: 0 | Status: SHIPPED | OUTPATIENT
Start: 2018-07-11 | End: 2018-11-07 | Stop reason: SDUPTHER

## 2018-07-11 RX ORDER — FAMOTIDINE 20 MG/1
20 TABLET, FILM COATED ORAL EVERY 12 HOURS
Qty: 10 TAB | Refills: 0 | Status: SHIPPED | OUTPATIENT
Start: 2018-07-11 | End: 2018-08-07

## 2018-07-11 RX ORDER — ACETAMINOPHEN 325 MG/1
650 TABLET ORAL
Qty: 30 TAB | Refills: 0 | Status: SHIPPED | OUTPATIENT
Start: 2018-07-11

## 2018-07-11 RX ORDER — AMLODIPINE BESYLATE 10 MG/1
10 TABLET ORAL DAILY
Qty: 30 TAB | Refills: 0 | Status: SHIPPED | OUTPATIENT
Start: 2018-07-12 | End: 2018-11-07 | Stop reason: SDUPTHER

## 2018-07-11 RX ORDER — LEVETIRACETAM 1000 MG/1
1000 TABLET ORAL EVERY 12 HOURS
Qty: 30 TAB | Refills: 0 | Status: SHIPPED | OUTPATIENT
Start: 2018-07-11 | End: 2018-08-07

## 2018-07-11 RX ADMIN — DOCUSATE SODIUM 100 MG: 100 CAPSULE, LIQUID FILLED ORAL at 09:14

## 2018-07-11 RX ADMIN — Medication 10 ML: at 07:18

## 2018-07-11 RX ADMIN — AMLODIPINE BESYLATE 10 MG: 5 TABLET ORAL at 09:13

## 2018-07-11 RX ADMIN — LISINOPRIL 40 MG: 20 TABLET ORAL at 09:13

## 2018-07-11 RX ADMIN — Medication 10 ML: at 14:53

## 2018-07-11 RX ADMIN — ACETAMINOPHEN 650 MG: 325 TABLET ORAL at 07:18

## 2018-07-11 RX ADMIN — CARVEDILOL 6.25 MG: 6.25 TABLET, FILM COATED ORAL at 09:12

## 2018-07-11 RX ADMIN — FAMOTIDINE 20 MG: 20 TABLET ORAL at 09:14

## 2018-07-11 RX ADMIN — HYDRALAZINE HYDROCHLORIDE 25 MG: 25 TABLET, FILM COATED ORAL at 16:48

## 2018-07-11 RX ADMIN — ACETAMINOPHEN 650 MG: 325 TABLET ORAL at 11:18

## 2018-07-11 RX ADMIN — ACETAMINOPHEN 650 MG: 325 TABLET ORAL at 03:10

## 2018-07-11 RX ADMIN — ACETAMINOPHEN 650 MG: 325 TABLET ORAL at 15:01

## 2018-07-11 RX ADMIN — CARVEDILOL 6.25 MG: 6.25 TABLET, FILM COATED ORAL at 16:48

## 2018-07-11 RX ADMIN — LEVETIRACETAM 1000 MG: 500 TABLET ORAL at 09:14

## 2018-07-11 RX ADMIN — HYDRALAZINE HYDROCHLORIDE 25 MG: 25 TABLET, FILM COATED ORAL at 09:13

## 2018-07-11 NOTE — PROGRESS NOTES
I have reviewed discharge instructions with the patient and spouse. The patient and spouse verbalized understanding. Bedside RN performed patient education and medication education. Discharge concerns initiated and discussed with patient, including clarification on \"who\" assists the patient at their home and instructions for when the home going patient should call their provider after discharge. Opportunity for questions and clarification was provided. Patient receptive to education: YES  Patient stated: Deno Koyanagi Understanding  Barriers to Education: None  Diagnosis Education given:  YES    Length of stay: 11  Expected Day of Discharge: 7/11/2018  Ask if they have \"Help at Home\" & add to white board?   YES    Education Day #: 11    Medication Education Given:  YES  M in the box Medication name: Tylenol, Amlodipine, Carvedilol, Pepcid, Hydralazine, Keppra, Lisinopril    Pt aware of [facility]CA[facility] survey: YES

## 2018-07-11 NOTE — DISCHARGE SUMMARY
Discharge Summary       PATIENT ID: Francy Burgos  MRN: [id number]   YOB: 1965    DATE OF ADMISSION: 7/1/2018  6:50 PM    DATE OF DISCHARGE: 7/11/18  PRIMARY CARE PROVIDER: None     ATTENDING PHYSICIAN: Rita Lloyd MD  DISCHARGING PROVIDER: Rita Lloyd MD    To contact this individual call [phone] and ask the  to page. If unavailable ask to be transferred the Adult Hospitalist Department. CONSULTATIONS: None    PROCEDURES/SURGERIES: Procedure(s):  LEFT FRONTAL TEMPORAL CRANIOTOMY FOR SUBDURAL HEMATOMA    ADMITTING DIAGNOSES & HOSPITAL COURSE:   This is a 50-year-old man without any significant past medical history who was in his usual state of health until about a week ago when the patient developed a headache. The headache is located at the back of the head, intermittent, a dull ache. No known aggravating or relieving factors. Before the onset of the headache, the patient was swimming in the swimming pool and also diving down into 12 foot depth. The headache did not start immediately, but the headache started later on in the evening. The patient has been having the headache intermittently since this swimming episode. On the day of presentation at the emergency room, the headache became associated with slurred speech. Because of that, the patient went to the Emergency Room at [place] [address]. When the patient arrived at the emergency room, a CT scan of the head was obtained. The CT scan shows a subdural hematoma with mass effect. The emergency room physician consulted neurosurgeon at [facility] [place], and transfer to [facility] was advised. The hospitalist service was asked to directly admit the patient from [place] [address] to the intensive care unit at [facility].  The patient denies shortness of breath. No chest pain, no fever, no rigors, no chills.   The patient's blood pressure was markedly elevated when he arrived at the emergency room, was started on a Cardene drip as advised by the neurosurgeon. Subdural hematoma with mild brain compression and mild L to R sub falcine shift: s/p LEFT FRONTAL TEMPORAL CRANIOTOMY FOR SUBDURAL HEMATOMA on 7/3.               - decadron completed             - keppra for sz ppx             - pepcid for GI ppx             - PT/OT/Speech consulted. Repeat CT scan of the head and CTA of the head did not show any interval changes. Hypertension ,likely undiagnosed preadmission as he has not been to a doctor for a while: on Norvasc,increased lisinopril to 40 mg, hydralazine 25tid, coreg.     Surgical wound clean and intact. DISCHARGE DIAGNOSES / PLAN:      1.  F/U with pcp, neuro surgery in 1 week for staple removal.  2. 2. Check BP at daily, record writings and take them to doctor. 3. Goal BP <160       PENDING TEST RESULTS:   At the time of discharge the following test results are still pending:     FOLLOW UP APPOINTMENTS:    Follow-up Information     Follow up With Details Comments Garrett Rojo MD On 7/16/2018 at 11:00 for staple removal. [place] [id number] ex[address]et      None   None (395) Patient stated that they have no PCP             ADDITIONAL CARE RECOMMENDATIONS:     DIET: Cardiac Diet  Oral Nutritional Supplements:     ACTIVITY: Activity as tolerated      DISCHARGE MEDICATIONS:  Discharge Medication List as of 7/11/2018  3:28 PM      START taking these medications    Details   acetaminophen (TYLENOL) 325 mg tablet Take 2 Tabs by mouth every four (4) hours as needed. , Print, Disp-30 Tab, R-0      amLODIPine (NORVASC) 10 mg tablet Take 1 Tab by mouth daily. , Print, Disp-30 Tab, R-0      carvedilol (COREG) 6.25 mg tablet Take 1 Tab by mouth two (2) times daily (with meals). , Print, Disp-30 Tab, R-1      famotidine (PEPCID) 20 mg tablet Take 1 Tab by mouth every twelve (12) hours. , Print, Disp-10 Tab, R-0 hydrALAZINE (APRESOLINE) 25 mg tablet Take 1 Tab by mouth three (3) times daily. , Print, Disp-90 Tab, R-0      levETIRAcetam 1,000 mg tablet Take 1 Tab by mouth every twelve (12) hours. , Print, Disp-30 Tab, R-0      lisinopril (PRINIVIL, ZESTRIL) 40 mg tablet Take 1 Tab by mouth daily. , Print, Disp-30 Tab, R-0         STOP taking these medications       ibuprofen (MOTRIN) 800 mg tablet Comments:   Reason for Stopping:                 NOTIFY YOUR PHYSICIAN FOR ANY OF THE FOLLOWING:   Fever over 101 degrees for 24 hours. Chest pain, shortness of breath, fever, chills, nausea, vomiting, diarrhea, change in mentation, falling, weakness, bleeding. Severe pain or pain not relieved by medications. Or, any other signs or symptoms that you may have questions about. DISPOSITION:  x  Home With:   OT  PT  HH  RN       Long term SNF/Inpatient Rehab    Independent/assisted living    Hospice    Other:       PATIENT CONDITION AT DISCHARGE: stable and improved    Functional status    Poor     Deconditioned     Independent      Cognition     Lucid     Forgetful     Dementia      Catheters/lines (plus indication)    Culp     PICC     PEG     None      Code status     Full code     DNR      PHYSICAL EXAMINATION AT DISCHARGE:     Constitutional:  No acute distress, cooperative   ENT:  Left periorbital ecchymosis. Oral mucous moist, oropharynx benign. Neck supple,    Resp:  CTA bilaterally. No wheezing/rhonchi/rales. No accessory muscle use   CV:  Regular rhythm, normal rate, no murmurs, gallops, rubs    GI:  Soft, non distended, non tender. normoactive bowel sounds, no hepatosplenomegaly     Musculoskeletal:  No edema, warm, 2+ pulses throughout    Neurologic: RUE resolved. Speech normal.Alert and oriented x4.        CHRONIC MEDICAL DIAGNOSES:  Problem List as of 7/11/2018  Date Reviewed: 7/3/2018          Codes Class Noted - Resolved    * (Principal)[facility] ICD-10-CM: I62.00  ICD-9-CM: 432.1  7/1/2018 - Present Greater than 35 minutes were spent with the patient on counseling and coordination of care    Signed:   Ras Felix MD  7/11/2018  7:33 PM

## 2018-07-11 NOTE — INTERDISCIPLINARY ROUNDS
IDR/SLIDR Summary          Patient: Naa Parker MRN: [id number]    Age: 47 y.o. YOB: 1965 Room/Bed: [facility]   Admit Diagnosis: Hemorragic stroke  Subdural hematoma (HCC)  LEFT FRONTAL TEMPORAL HEMATOMA  Principal Diagnosis: Subdural hematoma (HCC)   Goals: discharge  Readmission: NO  Quality Measure: Not applicable  VTE Prophylaxis: Mechanical  Influenza Vaccine screening completed? NO  Pneumococcal Vaccine screening completed? NO  Mobility needs: No   Nutrition plan:Yes  Consults:P.T, O.T. and Speech    Financial concerns:No  Escalated to CM? NO  RRAT Score: 5   Interventions:  Testing due for pt today?  NO  LOS: 10 days Expected length of stay 8 days  Discharge plan: home   PCP: None  Transportation needs: No    Days before discharge:ready for discharge  Discharge disposition: Home    Signed:     Elvia Yanes RN  7/11/2018  12:12 AM

## 2018-08-07 ENCOUNTER — HOSPITAL ENCOUNTER (EMERGENCY)
Age: 53
Discharge: HOME | End: 2018-08-07
Attending: EMERGENCY MEDICINE
Payer: COMMERCIAL

## 2018-08-07 ENCOUNTER — APPOINTMENT (OUTPATIENT)
Dept: CT IMAGING | Age: 53
End: 2018-08-07
Attending: EMERGENCY MEDICINE
Payer: COMMERCIAL

## 2018-08-07 VITALS
SYSTOLIC BLOOD PRESSURE: 155 MMHG | RESPIRATION RATE: 17 BRPM | OXYGEN SATURATION: 98 % | TEMPERATURE: 98.3 F | HEART RATE: 64 BPM | BODY MASS INDEX: 27.71 KG/M2 | WEIGHT: 222.88 LBS | DIASTOLIC BLOOD PRESSURE: 88 MMHG | HEIGHT: 75 IN

## 2018-08-07 DIAGNOSIS — R20.0 RIGHT ARM NUMBNESS: Primary | ICD-10-CM

## 2018-08-07 LAB
ALBUMIN SERPL-MCNC: 4.1 G/DL (ref 3.5–5)
ALBUMIN/GLOB SERPL: 1 {RATIO} (ref 1.1–2.2)
ALP SERPL-CCNC: 137 U/L (ref 45–117)
ALT SERPL-CCNC: 31 U/L (ref 12–78)
ANION GAP SERPL CALC-SCNC: 7 MMOL/L (ref 5–15)
APPEARANCE UR: CLEAR
APTT PPP: 29.2 SEC (ref 22.1–32)
AST SERPL-CCNC: 16 U/L (ref 15–37)
ATRIAL RATE: 73 BPM
BACTERIA URNS QL MICRO: NEGATIVE /HPF
BASOPHILS # BLD: 0.1 K/UL (ref 0–0.1)
BASOPHILS NFR BLD: 1 % (ref 0–1)
BILIRUB SERPL-MCNC: 0.6 MG/DL (ref 0.2–1)
BILIRUB UR QL: NEGATIVE
BUN SERPL-MCNC: 23 MG/DL (ref 6–20)
BUN/CREAT SERPL: 23 (ref 12–20)
CALCIUM SERPL-MCNC: 9.1 MG/DL (ref 8.5–10.1)
CALCULATED P AXIS, ECG09: 18 DEGREES
CALCULATED R AXIS, ECG10: -34 DEGREES
CALCULATED T AXIS, ECG11: 13 DEGREES
CHLORIDE SERPL-SCNC: 103 MMOL/L (ref 97–108)
CO2 SERPL-SCNC: 29 MMOL/L (ref 21–32)
COLOR UR: NORMAL
CREAT SERPL-MCNC: 0.99 MG/DL (ref 0.7–1.3)
DIAGNOSIS, 93000: NORMAL
DIFFERENTIAL METHOD BLD: ABNORMAL
EOSINOPHIL # BLD: 0.6 K/UL (ref 0–0.4)
EOSINOPHIL NFR BLD: 6 % (ref 0–7)
EPITH CASTS URNS QL MICRO: NORMAL /LPF
ERYTHROCYTE [DISTWIDTH] IN BLOOD BY AUTOMATED COUNT: 12.2 % (ref 11.5–14.5)
GLOBULIN SER CALC-MCNC: 4 G/DL (ref 2–4)
GLUCOSE BLD STRIP.AUTO-MCNC: 154 MG/DL (ref 65–100)
GLUCOSE SERPL-MCNC: 140 MG/DL (ref 65–100)
GLUCOSE UR STRIP.AUTO-MCNC: NEGATIVE MG/DL
HCT VFR BLD AUTO: 42.1 % (ref 36.6–50.3)
HGB BLD-MCNC: 14.7 G/DL (ref 12.1–17)
HGB UR QL STRIP: NEGATIVE
HYALINE CASTS URNS QL MICRO: NORMAL /LPF (ref 0–5)
IMM GRANULOCYTES # BLD: 0 K/UL (ref 0–0.04)
IMM GRANULOCYTES NFR BLD AUTO: 0 % (ref 0–0.5)
INR PPP: 1 (ref 0.9–1.1)
KETONES UR QL STRIP.AUTO: NEGATIVE MG/DL
LEUKOCYTE ESTERASE UR QL STRIP.AUTO: NEGATIVE
LYMPHOCYTES # BLD: 1.7 K/UL (ref 0.8–3.5)
LYMPHOCYTES NFR BLD: 18 % (ref 12–49)
MCH RBC QN AUTO: 30.9 PG (ref 26–34)
MCHC RBC AUTO-ENTMCNC: 34.9 G/DL (ref 30–36.5)
MCV RBC AUTO: 88.4 FL (ref 80–99)
MONOCYTES # BLD: 1.1 K/UL (ref 0–1)
MONOCYTES NFR BLD: 12 % (ref 5–13)
NEUTS SEG # BLD: 5.6 K/UL (ref 1.8–8)
NEUTS SEG NFR BLD: 62 % (ref 32–75)
NITRITE UR QL STRIP.AUTO: NEGATIVE
NRBC # BLD: 0 K/UL (ref 0–0.01)
NRBC BLD-RTO: 0 PER 100 WBC
P-R INTERVAL, ECG05: 168 MS
PH UR STRIP: 5 [PH] (ref 5–8)
PLATELET # BLD AUTO: 285 K/UL (ref 150–400)
PMV BLD AUTO: 9.3 FL (ref 8.9–12.9)
POTASSIUM SERPL-SCNC: 3.7 MMOL/L (ref 3.5–5.1)
PROT SERPL-MCNC: 8.1 G/DL (ref 6.4–8.2)
PROT UR STRIP-MCNC: NEGATIVE MG/DL
PROTHROMBIN TIME: 10.1 SEC (ref 9–11.1)
Q-T INTERVAL, ECG07: 396 MS
QRS DURATION, ECG06: 114 MS
QTC CALCULATION (BEZET), ECG08: 436 MS
RBC # BLD AUTO: 4.76 M/UL (ref 4.1–5.7)
RBC #/AREA URNS HPF: NORMAL /HPF (ref 0–5)
SERVICE CMNT-IMP: ABNORMAL
SODIUM SERPL-SCNC: 139 MMOL/L (ref 136–145)
SP GR UR REFRACTOMETRY: 1.02 (ref 1–1.03)
THERAPEUTIC RANGE,PTTT: NORMAL SECS (ref 58–77)
UR CULT HOLD, URHOLD: NORMAL
UROBILINOGEN UR QL STRIP.AUTO: 0.2 EU/DL (ref 0.2–1)
VENTRICULAR RATE, ECG03: 73 BPM
WBC # BLD AUTO: 9 K/UL (ref 4.1–11.1)
WBC URNS QL MICRO: NORMAL /HPF (ref 0–4)

## 2018-08-07 PROCEDURE — 81001 URINALYSIS AUTO W/SCOPE: CPT | Performed by: EMERGENCY MEDICINE

## 2018-08-07 PROCEDURE — 99285 EMERGENCY DEPT VISIT HI MDM: CPT

## 2018-08-07 PROCEDURE — 74011250637 HC RX REV CODE- 250/637: Performed by: EMERGENCY MEDICINE

## 2018-08-07 PROCEDURE — 82962 GLUCOSE BLOOD TEST: CPT

## 2018-08-07 PROCEDURE — 80053 COMPREHEN METABOLIC PANEL: CPT | Performed by: EMERGENCY MEDICINE

## 2018-08-07 PROCEDURE — 93005 ELECTROCARDIOGRAM TRACING: CPT

## 2018-08-07 PROCEDURE — 85730 THROMBOPLASTIN TIME PARTIAL: CPT | Performed by: EMERGENCY MEDICINE

## 2018-08-07 PROCEDURE — 85610 PROTHROMBIN TIME: CPT | Performed by: EMERGENCY MEDICINE

## 2018-08-07 PROCEDURE — 85025 COMPLETE CBC W/AUTO DIFF WBC: CPT | Performed by: EMERGENCY MEDICINE

## 2018-08-07 PROCEDURE — 36415 COLL VENOUS BLD VENIPUNCTURE: CPT | Performed by: EMERGENCY MEDICINE

## 2018-08-07 PROCEDURE — 70450 CT HEAD/BRAIN W/O DYE: CPT

## 2018-08-07 RX ORDER — LEVETIRACETAM 750 MG/1
750 TABLET ORAL 2 TIMES DAILY
Qty: 60 TAB | Refills: 0 | Status: SHIPPED | OUTPATIENT
Start: 2018-08-07 | End: 2018-09-06

## 2018-08-07 RX ORDER — LEVETIRACETAM 750 MG/1
750 TABLET ORAL 2 TIMES DAILY
Qty: 60 TAB | Refills: 1 | Status: SHIPPED | OUTPATIENT
Start: 2018-08-07 | End: 2018-08-07

## 2018-08-07 RX ADMIN — LEVETIRACETAM 750 MG: 500 TABLET ORAL at 12:14

## 2018-08-07 NOTE — ED TRIAGE NOTES
Triage note: Pt reports right arm numbness at 0845 this am which lasted for 3-4 minutes and then resolved. Pt had subdural hematoma evacuation on 7/3/2018, no identified cause.

## 2018-08-07 NOTE — PROGRESS NOTES
Admission Medication Reconciliation:    Information obtained from: Patient, wife, medication list    Significant PMH/Disease States:   Past Medical History:   Diagnosis Date    Subdural hemorrhage ([facility] 07/2018       Chief Complaint for this Admission:  Numbness    Allergies:  Review of patient's allergies indicates no known allergies. Prior to Admission Medications:   Prior to Admission Medications   Prescriptions Last Dose Informant Patient Reported? Taking?   acetaminophen (TYLENOL) 325 mg tablet 8/6/2018 at 2330  No Yes   Sig: Take 2 Tabs by mouth every four (4) hours as needed. amLODIPine (NORVASC) 10 mg tablet 8/7/2018 at Unknown time  No Yes   Sig: Take 1 Tab by mouth daily. carvedilol (COREG) 6.25 mg tablet 8/7/2018 at Unknown time  No Yes   Sig: Take 1 Tab by mouth two (2) times daily (with meals). hydrALAZINE (APRESOLINE) 25 mg tablet 8/7/2018 at Unknown time  No Yes   Sig: Take 1 Tab by mouth three (3) times daily. lisinopril (PRINIVIL, ZESTRIL) 40 mg tablet 8/7/2018 at Unknown time  No Yes   Sig: Take 1 Tab by mouth daily. Facility-Administered Medications: None         Comments/Recommendations: Removed pepcid and Keppra. Patient reports last Keppra dose was 500mg taken yesterday morning.

## 2018-08-07 NOTE — ED PROVIDER NOTES
HPI Comments: 47 y.o. male with past medical history significant for hypertension, subdural hematoma s/p craniotomy last month who presents with chief complaint of numbness. Patient had a 3-4 minute episode of right arm numbness that started 1 hour PTA and resolved spontaneously. Patient states \"it felt like I slept on it wrong. \"  Patient denies extremity weakness and any other neurologic symptoms. There are no other acute medical concerns at this time. PCP: None    Note written by [address] [place] [address],[address] [facility], as dictated by Adrienne Lam DO 9:42 AM    9:46 AM  Adrienne Lam DO reviewed electronic medical record system for any past medical records that were available that may contribute to the patient's current condition. Patient was admitted 7/1/18-7/11/18 for large acute and subacute left frontotemporoparietal subdural hematoma, s/p left fronto-temporal  craniotomy with evacuation of subdural hematoma. The history is provided by the patient. Past Medical History:   Diagnosis Date    Subdural hemorrhage ([address] 07/2018       Past Surgical History:   Procedure Laterality Date    HX CRANIOTOMY  07/2018         History reviewed. No pertinent family history. Social History     Social History    Marital status:      Spouse name: N/A    Number of children: N/A    Years of education: N/A     Occupational History    Not on file. Social History Main Topics    Smoking status: Never Smoker    Smokeless tobacco: Never Used    Alcohol use No    Drug use: No    Sexual activity: Yes     Partners: Female     Other Topics Concern    Not on file     Social History Narrative         ALLERGIES: Review of patient's allergies indicates no known allergies. Review of Systems   Constitutional: Negative for activity change, appetite change, chills and fever. HENT: Negative for congestion, rhinorrhea, sinus pain, sneezing and sore throat. Eyes: Negative for photophobia and visual disturbance. Respiratory: Negative for cough and shortness of breath. Cardiovascular: Negative for chest pain. Gastrointestinal: Negative for abdominal pain, blood in stool, constipation, diarrhea, nausea and vomiting. Genitourinary: Negative for difficulty urinating, dysuria, flank pain, hematuria, penile pain and testicular pain. Musculoskeletal: Negative for arthralgias, back pain, myalgias and neck pain. Skin: Negative for rash and wound. Neurological: Positive for numbness (right arm). Negative for syncope, weakness, light-headedness and headaches. Psychiatric/Behavioral: Negative for self-injury and suicidal ideas. All other systems reviewed and are negative. Vitals:    08/07/18 0935 08/07/18 1030   BP: (!) 170/95 149/88   Pulse: 78 67   Resp: 18 13   Temp: 98.1 °F (36.7 °C)    SpO2: 98%    Weight: 101.1 kg (222 lb 14.2 oz)    Height: 6' 3\" (1.905 m)             Physical Exam   Constitutional: He is oriented to person, place, and time. He appears well-developed and well-nourished. No distress. HENT:   Head: Normocephalic and atraumatic. Nose: Nose normal.   Mouth/Throat: Oropharynx is clear and moist.   Eyes: Conjunctivae and EOM are normal. Pupils are equal, round, and reactive to light. Neck: Neck supple. Cardiovascular: Normal rate, regular rhythm, normal heart sounds and intact distal pulses. No murmur heard. Pulmonary/Chest: Effort normal and breath sounds normal.   Abdominal: Soft. He exhibits no distension. There is no tenderness. Musculoskeletal: He exhibits no edema. Neurological: He is alert and oriented to person, place, and time. He has normal strength. No sensory deficit. GCS eye subscore is 4. GCS verbal subscore is 5. GCS motor subscore is 6. Cranial nerves intact, EOMI, GCS 15, no facial droop or dysarthria, intact finger to nose, negative pronator drift, intact sensation, 5/5 strength in all 4 extremities. Skin: Skin is warm and dry. No rash noted.  He is not diaphoretic. Nursing note and vitals reviewed. Note written by De Kumar. Abraham Gutierrez, as dictated by Monika Lipscomb DO 9:42 AM       [facility]      ED Course       Procedures    CONSULT NOTE:  9:45 AM Monika Lipscomb DO spoke with Dr. Ed Mars, Consult for Tele-Neurology. Discussed available diagnostic tests and clinical findings. Dr. Adelia Hinton is more concerned about simple partial seizure, given transient nature of symptoms; recommends increasing Keppra to 1500 mg BID from presently prescribed 1000 mg BID and discussion with neurosurgery. 10:42 AM  Spoke with patient regarding discussion with neurology and expressed concern for possible simple partial seizure. Patient states that over the last several weeks he has been weaning off Keppra - last dose was 500 mg yesterday. He has not had any other neurologic symptoms since his presentation in ED. No history of prior seizures. Awaiting lab results, will consult neurosurgery. Initially concerning for hypertension at 170/95 that could be contributing to his sx, but then improved without intervention, /88. ED EKG interpretation:  Rhythm: normal sinus rhythm; and regular . Rate (approx.): 73; No acute ST/T wave abnormalities suggestive of ischemia. Subtle changes in QRS complex in anterior leads V1 and V2, suggestive of possible developing incomplete RBBB. No other acute changes from prior EKG. Note written by De Kumar. Abraham Gutierrez, as dictated by Monika Lipscomb DO 10:47 AM    10:54 AM  Labs returned reassuringly with no significant abnormalities. UA negative. CT head reviewed by Monika Lipscomb DO and read by radiology, discussed with Code S tele-neurology. Consult placed for neurosurgery.     CT Results  (Last 48 hours)               08/07/18 0947  CT CODE NEURO HEAD WO CONTRAST Final result    Impression:  IMPRESSION: Status post left parietal craniotomy with chronic appearing 4.4 mm   extra-axial fluid collection likely postoperative in nature. No acute   abnormality is identified. No midline shift or herniation. Narrative:  EXAM:  CT CODE NEURO HEAD WO CONTRAST       INDICATION:   Acute right upper extremity weakness       COMPARISON: 7/4/2018. CONTRAST:  None. TECHNIQUE: Unenhanced CT of the head was performed using 5 mm images. Brain and   bone windows were generated. CT dose reduction was achieved through use of a   standardized protocol tailored for this examination and automatic exposure   control for dose modulation. FINDINGS:   The ventricles and sulci are normal in size, shape and configuration and   midline. The patient is status post left parietal craniotomy with a chronic 4.4   mm extra-axial fluid collection compatible with postoperative changes. There is   no intracranial hemorrhage, extra-axial collection, mass, mass effect or midline   shift. The basilar cisterns are open. No acute infarct is identified. The bone   windows demonstrate no abnormalities. The visualized portions of the paranasal   sinuses and mastoid air cells are clear. CONSULT NOTE:  11:32 AM Rafaela Shahid DO spoke with Korey Velasco NP, Consult for Neurosurgery. Discussed available diagnostic tests and clinical findings. Shanda Sharpe evaluated patient in ED; recommends discharge on Keppra 750 mg BID. Patient has follow up appt with Dr. Darren Cummings 8/24/18.    11:53 AM  Will give a dose of Keppra 750 mg in ED and discharge with prescription. Discussed plan with patient and wife at bedside, they state both understanding and agreement.

## 2018-08-07 NOTE — ED NOTES
Patient given discharge paperwork and instruction. Patient verbalized understanding and ambulated out of ED with wife.

## 2018-08-07 NOTE — DISCHARGE INSTRUCTIONS
Numbness and Tingling: Care Instructions  Your Care Instructions    Many things can cause numbness or tingling. Swelling may put pressure on a nerve. This could cause you to lose feeling or have a pins-and-needles sensation on part of your body. Nerves may be damaged from trauma, toxins, or diseases, such as diabetes or multiple sclerosis (MS). Sometimes, though, the cause is not clear. If there is no clear reason for your symptoms, and you are not having any other symptoms, your doctor may suggest watching and waiting for a while to see if the numbness or tingling goes away on its own. Your doctor may want you to have blood or nerve tests to find the cause of your symptoms. Follow-up care is a key part of your treatment and safety. Be sure to make and go to all appointments, and call your doctor if you are having problems. It's also a good idea to know your test results and keep a list of the medicines you take. How can you care for yourself at home? · If your doctor prescribes medicine, take it exactly as directed. Call your doctor if you think you are having a problem with your medicine. · If you have any swelling, put ice or a cold pack on the area for 10 to 20 minutes at a time. Put a thin cloth between the ice and your skin. When should you call for help? Call 911 anytime you think you may need emergency care. For example, call if:    · You have weakness, numbness, or tingling in both legs.     · You lose bowel or bladder control.     · You have symptoms of a stroke. These may include:  ¨ Sudden numbness, tingling, weakness, or loss of movement in your face, arm, or leg, especially on only one side of your body. ¨ Sudden vision changes. ¨ Sudden trouble speaking. ¨ Sudden confusion or trouble understanding simple statements. ¨ Sudden problems with walking or balance.   ¨ A sudden, severe headache that is different from past headaches.    Watch closely for changes in your health, and be sure to contact your doctor if you have any problems, or if:    · You do not get better as expected. Where can you learn more? Go to http://katie-angel.info/. Enter B685 in the search box to learn more about \"Numbness and Tingling: Care Instructions. \"  Current as of: September 10, 2017  Content Version: 11.7  © 3914-0186 [employer]. Care instructions adapted under license by [employer] (which disclaims liability or warranty for this information). If you have questions about a medical condition or this instruction, always ask your healthcare professional. [place] [address] any warranty or liability for your use of this information.

## 2018-08-07 NOTE — PROGRESS NOTES
Neurosurgery    Patient underwent a left frontoparietal craniotomy for evacuation of SDH on 07/03/18 with Dr. Rina Pearson. He has been tapering off of his Keppra over the last 2 weeks. His Last Keppra dose was yesterday. Over the weekend, he had 2 episodes of right arm numbness at night that resolved on their own. This morning around 8:30 he had right arm numbness and slurred speech that resolved spontaneously after a few minutes. His head CT shows a small amount of residual fluid at the post-op site but no mass effect. Pt is neurologically intact. Incision is well-healed. WBC is 9. Pt is afebrile. Spoke with Dr. Rina Pearson. We feel this is likely due to his Keppra being tapered. Will restart patient on Keppra 750 mg PO bid. Pt to keep f/u appt with Rina Pearson on 08/24/18 and f/u with PCP after that as scheduled. Discussed with ER doc, patient, wife. [id number] [address] for patient to d/c to home.     Rojas Holman, NP

## 2018-08-07 NOTE — PROGRESS NOTES
Date of previous inpatient admission/ ED visit? Patient last admitted 7/1-7/11 for Hemorragic Stroke. Patient with 2 ED visits in the past 6 months. What brought the patient back to ED? Chart reviewed. Patient  reports right arm numbness at 0845 this am which lasted for 3-4 minutes and then resolved. Pt had subdural hematoma evacuation on 7/3/2018, no identified cause. Did patient decline recommended services during last admission/ ED visit (if yes, what)? NO    Has patient seen a provider since their last inpatient admission/ED visit (if yes, when)? YES, Outpatient Therapy and Dr. Mariana Carlton for staple removal.    CM Interventions:  From previous inpatient admission/ED visit:  Patient discharged home with outpatient PT and family assistance. Follow-up appointment was scheduled for 7/16 with Dr. Crow An. Mariana Carlton for staple removal.    From current inpatient admission/ED visit:  Patient currently in the ED being evaluated and needs assessed. There are no current CM consults or needs. CM will continue to follow and assist with disposition needs as they arise.     MARIO Regalado/MAIKEL  10:32 AM

## 2018-08-07 NOTE — ED NOTES
pts wife reports pt speech was very slow this morning during this episode of right arm numbness when pt called to say come home at 0836 this morning.

## 2018-08-31 PROBLEM — I62.00 SUBDURAL HEMORRHAGE (HCC): Status: ACTIVE | Noted: 2018-08-31

## 2018-08-31 RX ORDER — HYDRALAZINE HYDROCHLORIDE 25 MG/1
25 TABLET, FILM COATED ORAL 3 TIMES DAILY
COMMUNITY
End: 2018-10-16 | Stop reason: SDUPTHER

## 2018-08-31 RX ORDER — LISINOPRIL 40 MG/1
40 TABLET ORAL DAILY
COMMUNITY
End: 2018-10-16 | Stop reason: SDUPTHER

## 2018-08-31 RX ORDER — CARVEDILOL 6.25 MG/1
TABLET ORAL 2 TIMES DAILY WITH MEALS
COMMUNITY
End: 2018-10-16 | Stop reason: SDUPTHER

## 2018-08-31 RX ORDER — LEVETIRACETAM 500 MG/1
1000 TABLET ORAL 2 TIMES DAILY
COMMUNITY
End: 2020-02-04

## 2018-08-31 RX ORDER — AMLODIPINE BESYLATE 10 MG/1
TABLET ORAL DAILY
COMMUNITY
End: 2018-10-16 | Stop reason: SDUPTHER

## 2018-09-04 ENCOUNTER — HOSPITAL ENCOUNTER (OUTPATIENT)
Dept: CT IMAGING | Age: 53
Discharge: HOME | End: 2018-09-04
Attending: NEUROLOGICAL SURGERY
Payer: COMMERCIAL

## 2018-09-04 DIAGNOSIS — S06.5XAA SUBDURAL HEMATOMA, ACUTE: ICD-10-CM

## 2018-09-04 PROCEDURE — 70450 CT HEAD/BRAIN W/O DYE: CPT

## 2018-09-06 ENCOUNTER — OFFICE VISIT (OUTPATIENT)
Dept: FAMILY MEDICINE CLINIC | Age: 53
End: 2018-09-06

## 2018-09-06 VITALS
SYSTOLIC BLOOD PRESSURE: 154 MMHG | HEIGHT: 75 IN | OXYGEN SATURATION: 99 % | TEMPERATURE: 98 F | DIASTOLIC BLOOD PRESSURE: 82 MMHG | WEIGHT: 220 LBS | BODY MASS INDEX: 27.35 KG/M2 | HEART RATE: 74 BPM

## 2018-09-06 DIAGNOSIS — I10 HTN (HYPERTENSION), BENIGN: Primary | ICD-10-CM

## 2018-09-06 NOTE — PROGRESS NOTES
9/6/2018 Chief Complaint Patient presents with  Blood Pressure Check HPI: 
Joan Maher is a 47 y.o. male who presents to clinic today for BP follow up. They were last seen 7/27. Patient is currently asymptomatic with regards to his hypertension. Patient's current medications are Amlodipine, Coreg, Lisinopril, Hydralazine. Patient is very compliant and does not miss doses. he does follow a healthy diet and consistent exercise is difficult 2/2 to restrictions from neurosurgeon. He take his blood pressures at home. Readings average systolic BP:[id number], diastolic BP 27C-14N. Of note, patient went to [facility] ED on 8/7, for right arm numbness and some slurred speech, neurology and neurosurgery were consulted, Head CT at that time showed no stoke. Neurosurgery thought patient was weaned off [address] too soon and likely had a simple partial seizure. Since that time, he has some minimal tingling but no overt episodes other than last Tuesday when he had slurred speech, only lasted 5 minutes. Discussed with neurosurgeon, and he ordered Head CT scan which was done recently. Patients past medical, surgical and family histories were reviewed. Allergies and Medications reviewed and updated. Review of Systems - Review of Systems -  
General ROS: negative for - chills or fever Respiratory ROS: negative for - cough, shortness of breath or wheezing Cardiovascular ROS: negative for - chest pain or palpitations Gastrointestinal ROS: negative for - abdominal pain, constipation, diarrhea, nausea, vomiting Neurological ROS: negative for - dizziness or headaches Dermatological ROS: negative for - rash or skin lesion changes Vitals: 
Vitals:  
 09/06/18 1435 BP: 154/82 Pulse: 74 Temp: 98 °F (36.7 °C) TempSrc: Oral  
SpO2: 99% Weight: 220 lb (99.8 kg) Height: 6' 3\" (1.905 m) Body mass index is 27.5 kg/(m^2). Objective General: Patient alert and oriented and in NAD 
 HEENT: PER/EOMI, no conjunctival pallor or scleral icterus. No thyromegaly or cervical lymphadenopathy Heart: Regular rate and rhythm, No murmurs, rubs or gallops. Lungs: Clear to auscultation bilaterally, no wheezing, rales or rhonchi Abd: +BS, non-tender, non-distended Ext: No edema Skin: No rashes or lesions noted on exposed skin Neuro: AAOx3 Psych: Appropriate mood and affect No results found for this or any previous visit (from the past 24 hour(s)). Assessment and Plan: 1. HTN (hypertension), benign 
-Looks to be adequately controlled, will continue current regimen, and have patient follow up in 6-8 weeks and consider D/C'ing hydralazine or metoprolol at that time, and adding diuretic if needed. Pt. Can call if BPs are consistently elevated >160 1. Medication changes include:None 2. Labs/Imaging/Tests ordered include: No orders of the defined types were placed in this encounter. 3. Recommendations: Advised the patient to meassure blood pressure at home and to bring logs at the next visit. Advised to be compliant with BP medication. Recommendations for Hypertension (elevated blood pressure):   
· Purchase a blood pressure cuff that goes around your upper arm and check blood pressure at least 3 times per week. Write down your numbers for review. Check blood pressure in the morning and evening. Rest for 5 minutes with feet elevated and arm resting on a table (at mid-chest level) when checking blood pressure   
· Exercise 30-60 minutes most days of the week, preferably with a mix of cardiovascular and strength training. Exercise can improve blood pressure, even if you do not lose weight!   
· Limit alcohol intake to less than 2-3 drinks daily   
· Avoid tobacco products   
· Avoid caffeine, energy drinks, stimulants   
· DASH diet - includes fruits, vegetables, fiber, and less than 2000 mg sodium (salt) daily.   
· Try to eat less than 6695-1504 calories daily.  Limit fat intake.   
 · Avoid non-steroidal inflammatory medications (NSAIDS) such as ibuprofen, advil, motrin, aleve, and naproxyn as these may increase blood pressure if used long-term   Avoid OTC decongestants such as pseudopherine, phenylephrine, Afrin I have discussed the diagnosis with the patient and the intended plan as seen in the above orders. he has expressed understanding. The patient has received an after-visit summary and questions were answered concerning future plans. I have discussed medication side effects and warnings with the patient as well. Follow-up Disposition: Not on File Electronically Signed: Lexy Metzger MD

## 2018-09-06 NOTE — MR AVS SNAPSHOT
[address] [place] [address] [facility] 
[phone] Patient: Juan Carlos Cameron MRN: [id number] :1965 Visit Information Date & Time Provider Department Dept. Phone Encounter #  
 2018  2:30 PM Mitra Fernandes, [address] [facility] 59  N [phone] [id number] Upcoming Health Maintenance Date Due Hepatitis C Screening 1965 DTaP/Tdap/Td series (1 - Tdap) 1985 FOBT Q 1 YEAR AGE 50-75 2014 Influenza Age 5 to Adult 2018 Allergies as of 2018  Review Complete On: 2018 By: Mitra Fernandes MD  
 No Known Allergies Current Immunizations  Never Reviewed No immunizations on file. Not reviewed this visit You Were Diagnosed With   
  
 Codes Comments HTN (hypertension), benign    -  Primary ICD-10-CM: I10 
ICD-9-CM: 401.1 Vitals BP Pulse Temp Height(growth percentile) Weight(growth percentile) SpO2  
 154/82 (BP 1 Location: Right arm, BP Patient Position: Sitting) 74 98 °F (36.7 °C) (Oral) 6' 3\" (1.905 m) 220 lb (99.8 kg) 99% BMI Smoking Status 27.5 kg/m2 Never Smoker Vitals History BMI and BSA Data Body Mass Index Body Surface Area  
 27.5 kg/m 2 2.3 m 2 Preferred Pharmacy Pharmacy Name Phone Dionne Haider [address] [phone] Your Updated Medication List  
  
   
This list is accurate as of 18  3:07 PM.  Always use your most recent med list.  
  
  
  
  
 acetaminophen 325 mg tablet Commonly known as:  TYLENOL Take 2 Tabs by mouth every four (4) hours as needed. * amLODIPine 10 mg tablet Commonly known as:  Michi Conine Take  by mouth daily. * amLODIPine 10 mg tablet Commonly known as:  Defiance Conine Take 1 Tab by mouth daily. * COREG 6.25 mg tablet Generic drug:  carvedilol Take  by mouth two (2) times daily (with meals). * carvedilol 6.25 mg tablet Commonly known as:  Supriya Grijalva Take 1 Tab by mouth two (2) times daily (with meals). * hydrALAZINE 25 mg tablet Commonly known as:  APRESOLINE Take 25 mg by mouth three (3) times daily. * hydrALAZINE 25 mg tablet Commonly known as:  APRESOLINE Take 1 Tab by mouth three (3) times daily. * KEPPRA 500 mg tablet Generic drug:  levETIRAcetam  
Take  by mouth two (2) times a day. * levETIRAcetam 750 mg tablet Commonly known as:  KEPPRA Take 1 Tab by mouth two (2) times a day for 30 days. * lisinopril 40 mg tablet Commonly known as:  Birdie Darnell Take 40 mg by mouth daily. * lisinopril 40 mg tablet Commonly known as:  Birdie Darnell Take 1 Tab by mouth daily. * Notice: This list has 10 medication(s) that are the same as other medications prescribed for you. Read the directions carefully, and ask your doctor or other care provider to review them with you. Patient Instructions High Blood Pressure: Care Instructions Your Care Instructions If your blood pressure is usually above 130/80, you have high blood pressure, or hypertension. That means the top number is 130 or higher or the bottom number is 80 or higher, or both. Despite what a lot of people think, high blood pressure usually doesn't cause headaches or make you feel dizzy or lightheaded. It usually has no symptoms. But it does increase your risk for heart attack, stroke, and kidney or eye damage. The higher your blood pressure, the more your risk increases. Your doctor will give you a goal for your blood pressure. Your goal will be based on your health and your age. Lifestyle changes, such as eating healthy and being active, are always important to help lower blood pressure. You might also take medicine to reach your blood pressure goal. 
Follow-up care is a key part of your treatment and safety.  Be sure to make and go to all appointments, and call your doctor if you are having problems. It's also a good idea to know your test results and keep a list of the medicines you take. How can you care for yourself at home? Medical treatment · If you stop taking your medicine, your blood pressure will go back up. You may take one or more types of medicine to lower your blood pressure. Be safe with medicines. Take your medicine exactly as prescribed. Call your doctor if you think you are having a problem with your medicine. · Talk to your doctor before you start taking aspirin every day. Aspirin can help certain people lower their risk of a heart attack or stroke. But taking aspirin isn't right for everyone, because it can cause serious bleeding. · See your doctor regularly. You may need to see the doctor more often at first or until your blood pressure comes down. · If you are taking blood pressure medicine, talk to your doctor before you take decongestants or anti-inflammatory medicine, such as ibuprofen. Some of these medicines can raise blood pressure. · Learn how to check your blood pressure at home. Lifestyle changes · Stay at a healthy weight. This is especially important if you put on weight around the waist. Losing even 10 pounds can help you lower your blood pressure. · If your doctor recommends it, get more exercise. Walking is a good choice. Bit by bit, increase the amount you walk every day. Try for at least 30 minutes on most days of the week. You also may want to swim, bike, or do other activities. · Avoid or limit alcohol. Talk to your doctor about whether you can drink any alcohol. · Try to limit how much sodium you eat to less than 2,300 milligrams (mg) a day. Your doctor may ask you to try to eat less than 1,500 mg a day. · Eat plenty of fruits (such as bananas and oranges), vegetables, legumes, whole grains, and low-fat dairy products. · Lower the amount of saturated fat in your diet. Saturated fat is found in animal products such as milk, cheese, and meat. Limiting these foods may help you lose weight and also lower your risk for heart disease. · Do not smoke. Smoking increases your risk for heart attack and stroke. If you need help quitting, talk to your doctor about stop-smoking programs and medicines. These can increase your chances of quitting for good. When should you call for help? Call 911 anytime you think you may need emergency care. This may mean having symptoms that suggest that your blood pressure is causing a serious heart or blood vessel problem. Your blood pressure may be over 180/110. 
 For example, call 911 if: 
  · You have symptoms of a heart attack. These may include: ¨ Chest pain or pressure, or a strange feeling in the chest. 
¨ Sweating. ¨ Shortness of breath. ¨ Nausea or vomiting. ¨ Pain, pressure, or a strange feeling in the back, neck, jaw, or upper belly or in one or both shoulders or arms. ¨ Lightheadedness or sudden weakness. ¨ A fast or irregular heartbeat.  
  · You have symptoms of a stroke. These may include: 
¨ Sudden numbness, tingling, weakness, or loss of movement in your face, arm, or leg, especially on only one side of your body. ¨ Sudden vision changes. ¨ Sudden trouble speaking. ¨ Sudden confusion or trouble understanding simple statements. ¨ Sudden problems with walking or balance. ¨ A sudden, severe headache that is different from past headaches.  
  · You have severe back or belly pain.  
 Do not wait until your blood pressure comes down on its own. Get help right away. 
 Call your doctor now or seek immediate care if: 
  · Your blood pressure is much higher than normal (such as 180/110 or higher), but you don't have symptoms.  
  · You think high blood pressure is causing symptoms, such as: ¨ Severe headache. ¨ Blurry vision.  Watch closely for changes in your health, and be sure to contact your doctor if: 
  · Your blood pressure measures 140/90 or higher at least 2 times. That means the top number is 140 or higher or the bottom number is 90 or higher, or both.  
  · You think you may be having side effects from your blood pressure medicine.  
  · Your blood pressure is usually normal, but it goes above normal at least 2 times. Where can you learn more? Go to http://katie-angel.info/. Enter D509 in the search box to learn more about \"High Blood Pressure: Care Instructions. \" Current as of: December 6, 2017 Content Version: 11.7 © 8759-4487 MapSense. Care instructions adapted under license by [employer] (which disclaims liability or warranty for this information). If you have questions about a medical condition or this instruction, always ask your healthcare professional. [address] any warranty or liability for your use of this information. Introducing [facility] & HEALTH SERVICES! Dear Farzad Tavares: 
Thank you for requesting a [email or web address] account. Our records indicate that you already have an active [email or web address] account. You can access your account anytime at [email or web address] Did you know that you can access your hospital and ER discharge instructions at any time in [email or web address]? You can also review all of your test results from your hospital stay or ER visit. Additional Information If you have questions, please visit the Frequently Asked Questions section of the [email or web address] website at [email or web address] Remember, [email or web address] is NOT to be used for urgent needs. For medical emergencies, dial 911. Now available from your iPhone and Android! Please provide this summary of care documentation to your next provider. Your primary care clinician is listed as Ramakrishna Gomez.  If you have any questions after today's visit, please call [phone].

## 2018-09-06 NOTE — PATIENT INSTRUCTIONS

## 2018-10-16 ENCOUNTER — OFFICE VISIT (OUTPATIENT)
Dept: FAMILY MEDICINE CLINIC | Age: 53
End: 2018-10-16

## 2018-10-16 VITALS
DIASTOLIC BLOOD PRESSURE: 92 MMHG | HEART RATE: 71 BPM | SYSTOLIC BLOOD PRESSURE: 141 MMHG | RESPIRATION RATE: 16 BRPM | OXYGEN SATURATION: 98 % | HEIGHT: 75 IN | WEIGHT: 217 LBS | TEMPERATURE: 98.1 F | BODY MASS INDEX: 26.98 KG/M2

## 2018-10-16 DIAGNOSIS — I10 HTN (HYPERTENSION), BENIGN: Primary | ICD-10-CM

## 2018-10-16 NOTE — PROGRESS NOTES
10/16/2018   Chief Complaint   Patient presents with    Hypertension       HPI:  Morris Long is a 46 y.o. male who presents to clinic today for HTN F/U. They were last seen 9/6. Patient is currently asymptomatic with regards to his hypertension. Patient's current medications are below. Patient is very compliant and does not miss doses. he does follow a healthy diet and has been walking. he does take his blood pressures at home. Readings average systolic BP: 452B-585T, diastolic BP 44V-41K. Current Outpatient Prescriptions:     levETIRAcetam (KEPPRA) 500 mg tablet, Take 1,000 mg by mouth two (2) times a day., Disp: , Rfl:     acetaminophen (TYLENOL) 325 mg tablet, Take 2 Tabs by mouth every four (4) hours as needed. , Disp: 30 Tab, Rfl: 0    amLODIPine (NORVASC) 10 mg tablet, Take 1 Tab by mouth daily. , Disp: 30 Tab, Rfl: 0    carvedilol (COREG) 6.25 mg tablet, Take 1 Tab by mouth two (2) times daily (with meals). , Disp: 30 Tab, Rfl: 1    hydrALAZINE (APRESOLINE) 25 mg tablet, Take 1 Tab by mouth three (3) times daily. , Disp: 90 Tab, Rfl: 0    lisinopril (PRINIVIL, ZESTRIL) 40 mg tablet, Take 1 Tab by mouth daily. , Disp: 30 Tab, Rfl: 0    Patients past medical, surgical and family histories were reviewed. Allergies and Medications reviewed and updated.     Review of Systems -     General ROS: negative for - chills or fever  Respiratory ROS: negative for - cough, shortness of breath or wheezing  Cardiovascular ROS: negative for - chest pain or palpitations  Gastrointestinal ROS: negative for - abdominal pain, constipation, diarrhea, nausea, vomiting  Neurological ROS: negative for - dizziness or headaches  Dermatological ROS: negative for - rash or skin lesion changes        Vitals:  Vitals:    10/16/18 0829   BP: (!) 141/92   Pulse: 71   Resp: 16   Temp: 98.1 °F (36.7 °C)   TempSrc: Oral   SpO2: 98%   Weight: 217 lb (98.4 kg)   Height: 6' 3\" (1.905 m)     Body mass index is 27.12 kg/(m^2). Objective  General: Patient alert and oriented and in NAD  HEENT: PER/EOMI, no conjunctival pallor or scleral icterus. No thyromegaly or cervical lymphadenopathy  Heart: Regular rate and rhythm, No murmurs, rubs or gallops. Lungs: Clear to auscultation bilaterally, no wheezing, rales or rhonchi  Abd: +BS, non-tender, non-distended  Ext: No edema  Skin: No rashes or lesions noted on exposed skin  Neuro: AAOx3  Psych: Appropriate mood and affect    No results found for this or any previous visit (from the past 24 hour(s)). Assessment and Plan:  1. HTN (hypertension), benign      -Looks to be adequately controlled, will D/C Coreg and instructed patient to clal if BPs stay elevated above 140/90, if still controlled in a few months, will have patient D/C midday dose of hydralazine and then D/C altogether     1. Labs/Imaging/Tests ordered include: No orders of the defined types were placed in this encounter.     2. Recommendations: Advised the patient to meassure blood pressure at home and to bring logs at the next visit. Advised to be compliant with BP medication. I have discussed the diagnosis with the patient and the intended plan as seen in the above orders. he has expressed understanding. The patient has received an after-visit summary and questions were answered concerning future plans. I have discussed medication side effects and warnings with the patient as well. Follow-up Disposition:  Return for HTN F/U.     Electronically Signed: Jesús Pena MD

## 2018-10-16 NOTE — PATIENT INSTRUCTIONS

## 2018-10-16 NOTE — MR AVS SNAPSHOT
[address] [place] [address] 
[phone] Patient: Ginny Skiff MRN: [id number] :1965 Visit Information Date & Time Provider Department Dept. Phone Encounter #  
 10/16/2018  8:30 AM Toya Odell MD [facility] 144 [phone] [id number] Follow-up Instructions Return in about 3 months (around 2019) for HTN F/U. Upcoming Health Maintenance Date Due Hepatitis C Screening 1965 DTaP/Tdap/Td series (1 - Tdap) 1985 Shingrix Vaccine Age 50> (1 of 2) 2014 FOBT Q 1 YEAR AGE 50-75 2014 Influenza Age 5 to Adult 2018 Allergies as of 10/16/2018  Review Complete On: 10/16/2018 By: Toya Odell MD  
 No Known Allergies Current Immunizations  Never Reviewed No immunizations on file. Not reviewed this visit You Were Diagnosed With   
  
 Codes Comments HTN (hypertension), benign    -  Primary ICD-10-CM: I10 
ICD-9-CM: 401.1 Vitals BP Pulse Temp Resp Height(growth percentile) Weight(growth percentile) (!) 141/92 (BP 1 Location: Left arm, BP Patient Position: Sitting) 71 98.1 °F (36.7 °C) (Oral) 16 6' 3\" (1.905 m) 217 lb (98.4 kg) SpO2 BMI Smoking Status 98% 27.12 kg/m2 Never Smoker Vitals History BMI and BSA Data Body Mass Index Body Surface Area  
 27.12 kg/m 2 2.28 m 2 Preferred Pharmacy Pharmacy Name Phone [facility] [address] [address] [phone] Your Updated Medication List  
  
   
This list is accurate as of 10/16/18  8:48 AM.  Always use your most recent med list.  
  
  
  
  
 acetaminophen 325 mg tablet Commonly known as:  TYLENOL Take 2 Tabs by mouth every four (4) hours as needed. amLODIPine 10 mg tablet Commonly known as:  Deveron Raj Take 1 Tab by mouth daily. carvedilol 6.25 mg tablet Commonly known as:  Shirley Hernández  
 Take 1 Tab by mouth two (2) times daily (with meals). hydrALAZINE 25 mg tablet Commonly known as:  APRESOLINE Take 1 Tab by mouth three (3) times daily. KEPPRA 500 mg tablet Generic drug:  levETIRAcetam  
Take 1,000 mg by mouth two (2) times a day. lisinopril 40 mg tablet Commonly known as:  Sowmya Resides Take 1 Tab by mouth daily. Follow-up Instructions Return in about 3 months (around 1/16/2019) for HTN F/U. Patient Instructions DASH Diet: Care Instructions Your Care Instructions The DASH diet is an eating plan that can help lower your blood pressure. DASH stands for Dietary Approaches to Stop Hypertension. Hypertension is high blood pressure. The DASH diet focuses on eating foods that are high in calcium, potassium, and magnesium. These nutrients can lower blood pressure. The foods that are highest in these nutrients are fruits, vegetables, low-fat dairy products, nuts, seeds, and legumes. But taking calcium, potassium, and magnesium supplements instead of eating foods that are high in those nutrients does not have the same effect. The DASH diet also includes whole grains, fish, and poultry. The DASH diet is one of several lifestyle changes your doctor may recommend to lower your high blood pressure. Your doctor may also want you to decrease the amount of sodium in your diet. Lowering sodium while following the DASH diet can lower blood pressure even further than just the DASH diet alone. Follow-up care is a key part of your treatment and safety. Be sure to make and go to all appointments, and call your doctor if you are having problems. It's also a good idea to know your test results and keep a list of the medicines you take. How can you care for yourself at home? Following the DASH diet · Eat 4 to 5 servings of fruit each day.  A serving is 1 medium-sized piece of fruit, ½ cup chopped or canned fruit, 1/4 cup dried fruit, or 4 ounces (½ cup) of fruit juice. Choose fruit more often than fruit juice. · Eat 4 to 5 servings of vegetables each day. A serving is 1 cup of lettuce or raw leafy vegetables, ½ cup of chopped or cooked vegetables, or 4 ounces (½ cup) of vegetable juice. Choose vegetables more often than vegetable juice. · Get 2 to 3 servings of low-fat and fat-free dairy each day. A serving is 8 ounces of milk, 1 cup of yogurt, or 1 ½ ounces of cheese. · Eat 6 to 8 servings of grains each day. A serving is 1 slice of bread, 1 ounce of dry cereal, or ½ cup of cooked rice, pasta, or cooked cereal. Try to choose whole-grain products as much as possible. · Limit lean meat, poultry, and fish to 2 servings each day. A serving is 3 ounces, about the size of a deck of cards. · Eat 4 to 5 servings of nuts, seeds, and legumes (cooked dried beans, lentils, and split peas) each week. A serving is 1/3 cup of nuts, 2 tablespoons of seeds, or ½ cup of cooked beans or peas. · Limit fats and oils to 2 to 3 servings each day. A serving is 1 teaspoon of vegetable oil or 2 tablespoons of salad dressing. · Limit sweets and added sugars to 5 servings or less a week. A serving is 1 tablespoon jelly or jam, ½ cup sorbet, or 1 cup of lemonade. · Eat less than 2,300 milligrams (mg) of sodium a day. If you limit your sodium to 1,500 mg a day, you can lower your blood pressure even more. Tips for success · Start small. Do not try to make dramatic changes to your diet all at once. You might feel that you are missing out on your favorite foods and then be more likely to not follow the plan. Make small changes, and stick with them. Once those changes become habit, add a few more changes. · Try some of the following: ¨ Make it a goal to eat a fruit or vegetable at every meal and at snacks. This will make it easy to get the recommended amount of fruits and vegetables each day. ¨ Try yogurt topped with fruit and nuts for a snack or healthy dessert. ¨ Add lettuce, tomato, cucumber, and onion to sandwiches. ¨ Combine a ready-made pizza crust with low-fat mozzarella cheese and lots of vegetable toppings. Try using tomatoes, squash, spinach, broccoli, carrots, cauliflower, and onions. ¨ Have a variety of cut-up vegetables with a low-fat dip as an appetizer instead of chips and dip. ¨ Sprinkle sunflower seeds or chopped almonds over salads. Or try adding chopped walnuts or almonds to cooked vegetables. ¨ Try some vegetarian meals using beans and peas. Add garbanzo or kidney beans to salads. Make burritos and tacos with mashed wallace beans or black beans. Where can you learn more? Go to http://katie-angel.info/. Enter Z431 in the search box to learn more about \"DASH Diet: Care Instructions. \" Current as of: December 6, 2017 Content Version: 11.8 © 6098-7323 [employer]. Care instructions adapted under license by [employer] (which disclaims liability or warranty for this information). If you have questions about a medical condition or this instruction, always ask your healthcare professional. [address] any warranty or liability for your use of this information. Introducing [facility] & HE[facility] SERVICES! Dear Dwain Everett: 
Thank you for requesting a [email or web address] account. Our records indicate that you already have an active [email or web address] account. You can access your account anytime at [email or web address] Did you know that you can access your hospital and ER discharge instructions at any time in [email or web address]? You can also review all of your test results from your hospital stay or ER visit. Additional Information If you have questions, please visit the Frequently Asked Questions section of the [email or web address] website at [email or web address] Remember, [email or web address] is NOT to be used for urgent needs. For medical emergencies, dial 911. Now available from your iPhone and Android! Please provide this summary of care documentation to your next provider. Your primary care clinician is listed as Tanvi Mendoza. If you have any questions after today's visit, please call [phone].

## 2018-11-07 DIAGNOSIS — I10 ESSENTIAL HYPERTENSION: Primary | ICD-10-CM

## 2018-11-09 RX ORDER — HYDRALAZINE HYDROCHLORIDE 25 MG/1
25 TABLET, FILM COATED ORAL 3 TIMES DAILY
Qty: 270 TAB | Refills: 1 | Status: SHIPPED | OUTPATIENT
Start: 2018-11-09 | End: 2018-12-20 | Stop reason: SDUPTHER

## 2018-11-09 RX ORDER — AMLODIPINE BESYLATE 10 MG/1
10 TABLET ORAL DAILY
Qty: 90 TAB | Refills: 1 | Status: SHIPPED | OUTPATIENT
Start: 2018-11-09 | End: 2018-12-20 | Stop reason: SDUPTHER

## 2018-11-09 RX ORDER — LISINOPRIL 40 MG/1
40 TABLET ORAL DAILY
Qty: 90 TAB | Refills: 1 | Status: SHIPPED | OUTPATIENT
Start: 2018-11-09 | End: 2018-12-20 | Stop reason: SDUPTHER

## 2018-12-20 ENCOUNTER — OFFICE VISIT (OUTPATIENT)
Dept: FAMILY MEDICINE CLINIC | Age: 53
End: 2018-12-20

## 2018-12-20 VITALS
TEMPERATURE: 98.6 F | HEART RATE: 84 BPM | SYSTOLIC BLOOD PRESSURE: 143 MMHG | HEIGHT: 75 IN | OXYGEN SATURATION: 98 % | DIASTOLIC BLOOD PRESSURE: 72 MMHG | WEIGHT: 222 LBS | BODY MASS INDEX: 27.6 KG/M2 | RESPIRATION RATE: 16 BRPM

## 2018-12-20 DIAGNOSIS — I10 HTN (HYPERTENSION), BENIGN: Primary | ICD-10-CM

## 2018-12-20 DIAGNOSIS — I10 ESSENTIAL HYPERTENSION: ICD-10-CM

## 2018-12-20 RX ORDER — HYDRALAZINE HYDROCHLORIDE 25 MG/1
25 TABLET, FILM COATED ORAL 3 TIMES DAILY
Qty: 270 TAB | Refills: 1 | Status: SHIPPED | OUTPATIENT
Start: 2018-12-20 | End: 2019-02-06 | Stop reason: SDUPTHER

## 2018-12-20 RX ORDER — AMLODIPINE BESYLATE 5 MG/1
5 TABLET ORAL DAILY
Qty: 90 TAB | Refills: 1 | Status: SHIPPED | OUTPATIENT
Start: 2018-12-20 | End: 2020-02-04

## 2018-12-20 RX ORDER — LISINOPRIL 40 MG/1
40 TABLET ORAL DAILY
Qty: 90 TAB | Refills: 1 | Status: SHIPPED | OUTPATIENT
Start: 2018-12-20 | End: 2019-02-06 | Stop reason: SDUPTHER

## 2018-12-20 NOTE — PATIENT INSTRUCTIONS
High Blood Pressure: Care Instructions  Your Care Instructions    If your blood pressure is usually above 130/80, you have high blood pressure, or hypertension. That means the top number is 130 or higher or the bottom number is 80 or higher, or both. Despite what a lot of people think, high blood pressure usually doesn't cause headaches or make you feel dizzy or lightheaded. It usually has no symptoms. But it does increase your risk for heart attack, stroke, and kidney or eye damage. The higher your blood pressure, the more your risk increases. Your doctor will give you a goal for your blood pressure. Your goal will be based on your health and your age. Lifestyle changes, such as eating healthy and being active, are always important to help lower blood pressure. You might also take medicine to reach your blood pressure goal.  Follow-up care is a key part of your treatment and safety. Be sure to make and go to all appointments, and call your doctor if you are having problems. It's also a good idea to know your test results and keep a list of the medicines you take. How can you care for yourself at home? Medical treatment  · If you stop taking your medicine, your blood pressure will go back up. You may take one or more types of medicine to lower your blood pressure. Be safe with medicines. Take your medicine exactly as prescribed. Call your doctor if you think you are having a problem with your medicine. · Talk to your doctor before you start taking aspirin every day. Aspirin can help certain people lower their risk of a heart attack or stroke. But taking aspirin isn't right for everyone, because it can cause serious bleeding. · See your doctor regularly. You may need to see the doctor more often at first or until your blood pressure comes down. · If you are taking blood pressure medicine, talk to your doctor before you take decongestants or anti-inflammatory medicine, such as ibuprofen.  Some of these medicines can raise blood pressure. · Learn how to check your blood pressure at home. Lifestyle changes  · Stay at a healthy weight. This is especially important if you put on weight around the waist. Losing even 10 pounds can help you lower your blood pressure. · If your doctor recommends it, get more exercise. Walking is a good choice. Bit by bit, increase the amount you walk every day. Try for at least 30 minutes on most days of the week. You also may want to swim, bike, or do other activities. · Avoid or limit alcohol. Talk to your doctor about whether you can drink any alcohol. · Try to limit how much sodium you eat to less than 2,300 milligrams (mg) a day. Your doctor may ask you to try to eat less than 1,500 mg a day. · Eat plenty of fruits (such as bananas and oranges), vegetables, legumes, whole grains, and low-fat dairy products. · Lower the amount of saturated fat in your diet. Saturated fat is found in animal products such as milk, cheese, and meat. Limiting these foods may help you lose weight and also lower your risk for heart disease. · Do not smoke. Smoking increases your risk for heart attack and stroke. If you need help quitting, talk to your doctor about stop-smoking programs and medicines. These can increase your chances of quitting for good. When should you call for help? Call 911 anytime you think you may need emergency care. This may mean having symptoms that suggest that your blood pressure is causing a serious heart or blood vessel problem. Your blood pressure may be over 180/120.   For example, call 911 if:    · You have symptoms of a heart attack. These may include:  ? Chest pain or pressure, or a strange feeling in the chest.  ? Sweating. ? Shortness of breath. ? Nausea or vomiting. ? Pain, pressure, or a strange feeling in the back, neck, jaw, or upper belly or in one or both shoulders or arms. ? Lightheadedness or sudden weakness.   ? A fast or irregular heartbeat.     · You have symptoms of a stroke. These may include:  ? Sudden numbness, tingling, weakness, or loss of movement in your face, arm, or leg, especially on only one side of your body. ? Sudden vision changes. ? Sudden trouble speaking. ? Sudden confusion or trouble understanding simple statements. ? Sudden problems with walking or balance. ? A sudden, severe headache that is different from past headaches.     · You have severe back or belly pain.    Do not wait until your blood pressure comes down on its own. Get help right away.   Call your doctor now or seek immediate care if:    · Your blood pressure is much higher than normal (such as 180/120 or higher), but you don't have symptoms.     · You think high blood pressure is causing symptoms, such as:  ? Severe headache.  ? Blurry vision.    Watch closely for changes in your health, and be sure to contact your doctor if:    · Your blood pressure measures higher than your doctor recommends at least 2 times. That means the top number is higher or the bottom number is higher, or both.     · You think you may be having side effects from your blood pressure medicine. Where can you learn more? Go to http://katie-angel.info/. Enter B641 in the search box to learn more about \"High Blood Pressure: Care Instructions. \"  Current as of: December 6, 2017  Content Version: 11.8  © 2441-8161 Healthwise, Incorporated. Care instructions adapted under license by [employer] (which disclaims liability or warranty for this information). If you have questions about a medical condition or this instruction, always ask your healthcare professional. [place] [address] any warranty or liability for your use of this information.

## 2018-12-20 NOTE — PROGRESS NOTES
Identified pt with two pt identifiers(name and ). Chief Complaint   Patient presents with    Follow-up     hypertension        Health Maintenance Due   Topic    Hepatitis C Screening     DTaP/Tdap/Td series (1 - Tdap)    Shingrix Vaccine Age 50> (1 of 2)    FOBT Q 1 YEAR AGE 54-65     Influenza Age 5 to Adult        Wt Readings from Last 3 Encounters:   10/16/18 217 lb (98.4 kg)   18 220 lb (99.8 kg)   18 222 lb 14.2 oz (101.1 kg)     Temp Readings from Last 3 Encounters:   10/16/18 98.1 °F (36.7 °C) (Oral)   18 98 °F (36.7 °C) (Oral)   18 98.3 °F (36.8 °C)     BP Readings from Last 3 Encounters:   10/16/18 (!) 141/92   18 154/82   18 155/88     Pulse Readings from Last 3 Encounters:   10/16/18 71   18 74   18 64         Learning Assessment:  :     No flowsheet data found. Depression Screening:  :     PHQ over the last two weeks 2018   Little interest or pleasure in doing things Not at all   Feeling down, depressed, irritable, or hopeless Not at all   Total Score PHQ 2 0       Fall Risk Assessment:  :     No flowsheet data found. Abuse Screening:  :     No flowsheet data found. Coordination of Care Questionnaire:  :     1) Have you been to an emergency room, urgent care clinic since your last visit? no   Hospitalized since your last visit? no             2) Have you seen or consulted any other health care providers outside of [address] [place] [address] since your last visit? no  (Include any pap smears or colon screenings in this section.)    3) Do you have an Advance Directive on file? no  Are you interested in receiving information about Advance Directives? no    Patient is accompanied by Wife I have received verbal consent from Archana Araya to discuss any/all medical information while they are present in the room. Reviewed record in preparation for visit and have obtained necessary documentation.   Medication reconciliation up to date and corrected with patient at this time.

## 2018-12-20 NOTE — PROGRESS NOTES
12/20/2018   Chief Complaint   Patient presents with    Follow-up     hypertension       HPI:  Layne Barroso is a 46 y.o. male who presents to clinic today for HTN F/U. They were last seen 10/16. Patient is currently asymptomatic with regards to his hypertension. Patient's current medications are below. Patient is very compliant and does not miss doses. he does follow a healthy diet and has been walking. he does take his blood pressures at home. Readings average systolic BP: 232E-394W, diastolic BP 41H-85R. Current Outpatient Medications:     amLODIPine (NORVASC) 5 mg tablet, Take 1 Tab by mouth daily. , Disp: 90 Tab, Rfl: 1    hydrALAZINE (APRESOLINE) 25 mg tablet, Take 1 Tab by mouth three (3) times daily. , Disp: 270 Tab, Rfl: 1    lisinopril (PRINIVIL, ZESTRIL) 40 mg tablet, Take 1 Tab by mouth daily. , Disp: 90 Tab, Rfl: 1    levETIRAcetam (KEPPRA) 500 mg tablet, Take 1,000 mg by mouth two (2) times a day., Disp: , Rfl:     acetaminophen (TYLENOL) 325 mg tablet, Take 2 Tabs by mouth every four (4) hours as needed. , Disp: 30 Tab, Rfl: 0    Patients past medical, surgical and family histories were reviewed. Allergies and Medications reviewed and updated. Review of Systems -     (Positive ROS in BOLD)    Constitutional: fever, chills, fatigue  Respiratory: cough, shortness of breath or wheezing  Cardiovascular: chest pain or palpitations. Leg swelling b/l          Vitals:  Vitals:    12/20/18 0914   BP: 162/73   Pulse: 84   Resp: 16   Temp: 98.6 °F (37 °C)   TempSrc: Oral   SpO2: 98%   Weight: 222 lb (100.7 kg)   Height: 6' 3\" (1.905 m)     Body mass index is 27.75 kg/m². Objective  General: Patient in NAD  HEENT: PER/EOMI, no conjunctival pallor or scleral icterus. Cardiovascular: Regular rate and rhythm, No murmurs, rubs or gallops. No LE edema  Respiratory: Lungs clear to auscultation bilaterally, no wheezing, rales or rhonchi. No accessory muscle use.         No results found for this or any previous visit (from the past 24 hour(s)). Assessment and Plan:  1. HTN (hypertension), benign      -Looks to be elevated in office today, Coreg D/C'ed at last visit, home readings however are always 120s-130s SBP. Did not some swelling after standing for long periods of time at [facility], which has resolved, will half amlodipine dose and have patient call back/use portal in 2-3 weeks and will adjust doses as needed     1. Labs/Imaging/Tests ordered include: No orders of the defined types were placed in this encounter.     2. Recommendations: Advised the patient to meassure blood pressure at home and to bring logs at the next visit. Advised to be compliant with BP medication. I have discussed the diagnosis with the patient and the intended plan as seen in the above orders. he has expressed understanding. The patient has received an after-visit summary and questions were answered concerning future plans. I have discussed medication side effects and warnings with the patient as well. Follow-up Disposition:  Return in about 3 months (around 3/20/2019) for HTN F/U.     Electronically Signed: Alee Gan MD

## 2019-02-04 DIAGNOSIS — I10 ESSENTIAL HYPERTENSION: ICD-10-CM

## 2019-02-05 RX ORDER — LISINOPRIL 40 MG/1
40 TABLET ORAL DAILY
Qty: 90 TAB | Refills: 1 | OUTPATIENT
Start: 2019-02-05

## 2019-02-05 RX ORDER — HYDRALAZINE HYDROCHLORIDE 25 MG/1
25 TABLET, FILM COATED ORAL 3 TIMES DAILY
Qty: 270 TAB | Refills: 1 | OUTPATIENT
Start: 2019-02-05

## 2019-02-05 NOTE — TELEPHONE ENCOUNTER
Patient should have enough refills for 6 months. He was given 90 day supply with 1 refill in December.

## 2019-02-06 DIAGNOSIS — I10 ESSENTIAL HYPERTENSION: ICD-10-CM

## 2019-02-07 RX ORDER — HYDRALAZINE HYDROCHLORIDE 25 MG/1
25 TABLET, FILM COATED ORAL 3 TIMES DAILY
Qty: 270 TAB | Refills: 1 | Status: SHIPPED | OUTPATIENT
Start: 2019-02-07 | End: 2019-08-01 | Stop reason: SDUPTHER

## 2019-02-07 RX ORDER — LISINOPRIL 40 MG/1
40 TABLET ORAL DAILY
Qty: 90 TAB | Refills: 1 | Status: SHIPPED | OUTPATIENT
Start: 2019-02-07 | End: 2019-08-02 | Stop reason: SDUPTHER

## 2019-08-01 DIAGNOSIS — I10 ESSENTIAL HYPERTENSION: ICD-10-CM

## 2019-08-02 RX ORDER — LISINOPRIL 40 MG/1
40 TABLET ORAL DAILY
Qty: 90 TAB | Refills: 1 | Status: SHIPPED | OUTPATIENT
Start: 2019-08-02 | End: 2019-08-13 | Stop reason: SDUPTHER

## 2019-08-02 RX ORDER — HYDRALAZINE HYDROCHLORIDE 25 MG/1
25 TABLET, FILM COATED ORAL 3 TIMES DAILY
Qty: 270 TAB | Refills: 1 | Status: SHIPPED | OUTPATIENT
Start: 2019-08-02 | End: 2019-08-13 | Stop reason: SDUPTHER

## 2019-08-13 ENCOUNTER — OFFICE VISIT (OUTPATIENT)
Dept: FAMILY MEDICINE CLINIC | Age: 54
End: 2019-08-13

## 2019-08-13 VITALS
OXYGEN SATURATION: 98 % | DIASTOLIC BLOOD PRESSURE: 91 MMHG | HEIGHT: 75 IN | BODY MASS INDEX: 27.6 KG/M2 | RESPIRATION RATE: 16 BRPM | WEIGHT: 222 LBS | HEART RATE: 51 BPM | SYSTOLIC BLOOD PRESSURE: 153 MMHG

## 2019-08-13 DIAGNOSIS — Z13.6 SCREENING FOR CARDIOVASCULAR CONDITION: ICD-10-CM

## 2019-08-13 DIAGNOSIS — I10 ESSENTIAL HYPERTENSION: Primary | ICD-10-CM

## 2019-08-13 DIAGNOSIS — S06.5XAA SUBDURAL HEMATOMA, ACUTE: ICD-10-CM

## 2019-08-13 RX ORDER — LISINOPRIL 40 MG/1
40 TABLET ORAL DAILY
Qty: 90 TAB | Refills: 1 | Status: SHIPPED | OUTPATIENT
Start: 2019-08-13 | End: 2020-01-23 | Stop reason: SDUPTHER

## 2019-08-13 RX ORDER — HYDRALAZINE HYDROCHLORIDE 25 MG/1
25 TABLET, FILM COATED ORAL 3 TIMES DAILY
Qty: 270 TAB | Refills: 1 | Status: SHIPPED | OUTPATIENT
Start: 2019-08-13 | End: 2020-01-23 | Stop reason: SDUPTHER

## 2019-08-13 RX ORDER — LEVETIRACETAM 250 MG/1
250 TABLET ORAL 2 TIMES DAILY
COMMUNITY
End: 2020-02-04

## 2019-08-13 NOTE — PROGRESS NOTES
Og Esteban is a 46 y.o. male who presents today with the following:    HPI  Chief Complaint   Patient presents with    Hypertension     f/u; elevated at home BP readings recently; pt states he has had high anxiety knowing he had higher readings at appts and is worried about having to take more medication       1. Essential hypertension    2. Subdural hematoma, acute ([address]    3. Screening for cardiovascular condition       HTN  The patient presents today for HTN follow-up. Taking medications daily w/o complications. Home BP readings range from 319V systolic and 02O diastolic. SIde effects of meds: None  Patient trying to follow low salt diet. Lab Results   Component Value Date/Time    Sodium 139 08/07/2018 09:51 AM    Potassium 3.7 08/07/2018 09:51 AM    Chloride 103 08/07/2018 09:51 AM    CO2 29 08/07/2018 09:51 AM    Anion gap 7 08/07/2018 09:51 AM    Glucose 140 (H) 08/07/2018 09:51 AM    BUN 23 (H) 08/07/2018 09:51 AM    Creatinine 0.99 08/07/2018 09:51 AM    BUN/Creatinine ratio 23 (H) 08/07/2018 09:51 AM    GFR est AA >60 08/07/2018 09:51 AM    GFR est non-AA >60 08/07/2018 09:51 AM    Calcium 9.1 08/07/2018 09:51 AM     Patient declines any preventive services today. Review of Systems   Constitutional: Negative. HENT: Negative. Eyes: Negative. Respiratory: Negative. Cardiovascular: Negative. Gastrointestinal: Negative. Genitourinary: Negative. Musculoskeletal: Negative. Skin: Negative. Neurological: Negative. Endo/Heme/Allergies: Negative. Psychiatric/Behavioral: The patient is nervous/anxious. Physical Exam   Constitutional: He is oriented to person, place, and time and well-developed, well-nourished, and in no distress. HENT:   Head: Normocephalic and atraumatic. Right Ear: External ear normal.   Left Ear: External ear normal.   Nose: Nose normal.   Mouth/Throat: No oropharyngeal exudate.    Eyes: Conjunctivae are normal.   Neck: Normal range of motion. Neck supple. No thyromegaly present. Cardiovascular: Normal rate and regular rhythm. Pulmonary/Chest: Effort normal and breath sounds normal.   Abdominal: Soft. Bowel sounds are normal. He exhibits no distension. There is no tenderness. Musculoskeletal: Normal range of motion. He exhibits no edema. Lymphadenopathy:     He has no cervical adenopathy. Neurological: He is alert and oriented to person, place, and time. Skin: Skin is warm and dry. Psychiatric: Mood and affect normal.   Nursing note and vitals reviewed. BP (!) 153/91   Pulse (!) 51   Resp 16   Ht 6' 3\" (1.905 m)   Wt 222 lb (100.7 kg)   SpO2 98%   BMI 27.75 kg/m²     No Known Allergies    Current Outpatient Medications   Medication Sig    levETIRAcetam (KEPPRA) 250 mg tablet Take 250 mg by mouth two (2) times a day.  hydrALAZINE (APRESOLINE) 25 mg tablet Take 1 Tab by mouth three (3) times daily.  lisinopril (PRINIVIL, ZESTRIL) 40 mg tablet Take 1 Tab by mouth daily.  amLODIPine (NORVASC) 5 mg tablet Take 1 Tab by mouth daily.  levETIRAcetam (KEPPRA) 500 mg tablet Take 1,000 mg by mouth two (2) times a day.  acetaminophen (TYLENOL) 325 mg tablet Take 2 Tabs by mouth every four (4) hours as needed. No current facility-administered medications for this visit. Past Medical History:   Diagnosis Date    HTN (hypertension)     Subdural hemorrhage ([facility] 07/2018       Past Surgical History:   Procedure Laterality Date    HX CRANIOTOMY  07/2018       Problem List  Date Reviewed: 8/13/2019          Codes Class Noted    HTN (hypertension), benign ICD-10-CM: I10  ICD-9-CM: 401.1  9/6/2018        Subdural hemorrhage ([facility] ICD-10-CM: I62.00  ICD-9-CM: 432.1  8/31/2018        Subdural hematoma ([facility] ICD-10-CM: W11.1E0F  ICD-9-CM: 432.1  7/1/2018               No results found for this visit on 08/13/19. 1. Subdural hematoma, acute ([facility]  Had surgery at [facility].   Neurosurgeon is Dr. Lucie Manriquez on at [facility]. Patient reports that they are trying to wean him off of Keppra. 2. Essential hypertension  Patient reports having stable blood pressures at home. Blood pressure log reviewed. He does not does not want any blood pressure medication changes at this time. Dexter Denny has been given the following recommendations today due to his elevated BP reading: rescreen BP within a minimum of 2 weeks, lifestyle modifications to include: DASH eating plan, dietary sodium restriction, increase physical activity and moderation in alcohol consumption, lab tests ordered and Patient declined changes in blood pressure medication   .  - METABOLIC PANEL, COMPREHENSIVE  - TSH 3RD GENERATION  - hydrALAZINE (APRESOLINE) 25 mg tablet; Take 1 Tab by mouth three (3) times daily. Dispense: 270 Tab; Refill: 1  - lisinopril (PRINIVIL, ZESTRIL) 40 mg tablet; Take 1 Tab by mouth daily. Dispense: 90 Tab; Refill: 1    3. Screening for cardiovascular condition    - LIPID PANEL        Follow-up and Dispositions    · Return in about 6 months (around 2/13/2020) for follow up, labs, BP. I have discussed the diagnosis with the patient and the intended plan as seen in the above orders. The patient has received an after-visit summary and questions were answered concerning future plans. I have discussed medication side effects and warnings with the patient as well. The patient agrees and understands above plan. Chandan Damian MD                Discussed the patient's BMI with him. The BMI follow up plan is as follows:     dietary management education, guidance, and counseling  encourage exercise  monitor weight  prescribed dietary intake    An After Visit Summary was printed and given to the patient. The patient's abnormal BMI was reviewed and deemed target BMI for the patient. An After Visit Summary was provided to the patient and/or caregiver.

## 2019-08-13 NOTE — PATIENT INSTRUCTIONS
Body Mass Index: Care Instructions Your Care Instructions Body mass index (BMI) can help you see if your weight is raising your risk for health problems. It uses a formula to compare how much you weigh with how tall you are. · A BMI lower than 18.5 is considered underweight. · A BMI between 18.5 and 24.9 is considered healthy. · A BMI between 25 and 29.9 is considered overweight. A BMI of 30 or higher is considered obese. If your BMI is in the normal range, it means that you have a lower risk for weight-related health problems. If your BMI is in the overweight or obese range, you may be at increased risk for weight-related health problems, such as high blood pressure, heart disease, stroke, arthritis or joint pain, and diabetes. If your BMI is in the underweight range, you may be at increased risk for health problems such as fatigue, lower protection (immunity) against illness, muscle loss, bone loss, hair loss, and hormone problems. BMI is just one measure of your risk for weight-related health problems. You may be at higher risk for health problems if you are not active, you eat an unhealthy diet, or you drink too much alcohol or use tobacco products. Follow-up care is a key part of your treatment and safety. Be sure to make and go to all appointments, and call your doctor if you are having problems. It's also a good idea to know your test results and keep a list of the medicines you take. How can you care for yourself at home? · Practice healthy eating habits. This includes eating plenty of fruits, vegetables, whole grains, lean protein, and low-fat dairy. · If your doctor recommends it, get more exercise. Walking is a good choice. Bit by bit, increase the amount you walk every day. Try for at least 30 minutes on most days of the week. · Do not smoke. Smoking can increase your risk for health problems. If you need help quitting, talk to your doctor about stop-smoking programs and medicines. These can increase your chances of quitting for good. · Limit alcohol to 2 drinks a day for men and 1 drink a day for women. Too much alcohol can cause health problems. If you have a BMI higher than 25 · Your doctor may do other tests to check your risk for weight-related health problems. This may include measuring the distance around your waist. A waist measurement of more than 40 inches in men or 35 inches in women can increase the risk of weight-related health problems. · Talk with your doctor about steps you can take to stay healthy or improve your health. You may need to make lifestyle changes to lose weight and stay healthy, such as changing your diet and getting regular exercise. If you have a BMI lower than 18.5 · Your doctor may do other tests to check your risk for health problems. · Talk with your doctor about steps you can take to stay healthy or improve your health. You may need to make lifestyle changes to gain or maintain weight and stay healthy, such as getting more healthy foods in your diet and doing exercises to build muscle. Where can you learn more? Go to http://katie-angel.info/. Enter S176 in the search box to learn more about \"Body Mass Index: Care Instructions. \" Current as of: October 13, 2016 Content Version: 11.4 © 8384-3566 Healthwise, Incorporated. Care instructions adapted under license by [employer] (which disclaims liability or warranty for this information). If you have questions about a medical condition or this instruction, always ask your healthcare professional. [place] [address] any warranty or liability for your use of this information. Body Mass Index: Care Instructions Your Care Instructions Body mass index (BMI) can help you see if your weight is raising your risk for health problems. It uses a formula to compare how much you weigh with how tall you are. · A BMI lower than 18.5 is considered underweight. · A BMI between 18.5 and 24.9 is considered healthy. · A BMI between 25 and 29.9 is considered overweight. A BMI of 30 or higher is considered obese. If your BMI is in the normal range, it means that you have a lower risk for weight-related health problems. If your BMI is in the overweight or obese range, you may be at increased risk for weight-related health problems, such as high blood pressure, heart disease, stroke, arthritis or joint pain, and diabetes. If your BMI is in the underweight range, you may be at increased risk for health problems such as fatigue, lower protection (immunity) against illness, muscle loss, bone loss, hair loss, and hormone problems. BMI is just one measure of your risk for weight-related health problems. You may be at higher risk for health problems if you are not active, you eat an unhealthy diet, or you drink too much alcohol or use tobacco products. Follow-up care is a key part of your treatment and safety. Be sure to make and go to all appointments, and call your doctor if you are having problems. It's also a good idea to know your test results and keep a list of the medicines you take. How can you care for yourself at home? · Practice healthy eating habits. This includes eating plenty of fruits, vegetables, whole grains, lean protein, and low-fat dairy. · If your doctor recommends it, get more exercise. Walking is a good choice. Bit by bit, increase the amount you walk every day. Try for at least 30 minutes on most days of the week. · Do not smoke. Smoking can increase your risk for health problems. If you need help quitting, talk to your doctor about stop-smoking programs and medicines. These can increase your chances of quitting for good. · Limit alcohol to 2 drinks a day for men and 1 drink a day for women. Too much alcohol can cause health problems. If you have a BMI higher than 25 · Your doctor may do other tests to check your risk for weight-related health problems. This may include measuring the distance around your waist. A waist measurement of more than 40 inches in men or 35 inches in women can increase the risk of weight-related health problems. · Talk with your doctor about steps you can take to stay healthy or improve your health. You may need to make lifestyle changes to lose weight and stay healthy, such as changing your diet and getting regular exercise. If you have a BMI lower than 18.5 · Your doctor may do other tests to check your risk for health problems. · Talk with your doctor about steps you can take to stay healthy or improve your health. You may need to make lifestyle changes to gain or maintain weight and stay healthy, such as getting more healthy foods in your diet and doing exercises to build muscle. Where can you learn more? Go to http://katie-angel.info/. Enter S176 in the search box to learn more about \"Body Mass Index: Care Instructions. \" Current as of: October 13, 2016 Content Version: 11.4 © 4337-2192 Healthwise, [employer]. Care instructions adapted under license by [employer] (which disclaims liability or warranty for this information). If you have questions about a medical condition or this instruction, always ask your healthcare professional. [place] [address] any warranty or liability for your use of this information.

## 2019-08-13 NOTE — PROGRESS NOTES
Identified pt with two pt identifiers(name and ). Reviewed record in preparation for visit and have obtained necessary documentation. Chief Complaint   Patient presents with    Hypertension     f/u; elevated at home BP readings recently; pt states he has had high anxiety knowing he had higher readings at appts and is worried about having to take more medication        Health Maintenance Due   Topic    Hepatitis C Screening     DTaP/Tdap/Td series (1 - Tdap)    Shingrix Vaccine Age 50> (1 of 2)    FOBT Q 1 YEAR AGE 54-65     Influenza Age 5 to Adult        Coordination of Care Questionnaire:  :   1) Have you been to an emergency room, urgent care, or hospitalized since your last visit? If yes, where when, and reason for visit? no       2. Have seen or consulted any other health care provider since your last visit? If yes, where when, and reason for visit? YES  - Dr. Kings Walter (neuro)    Patient is accompanied by wife I have received verbal consent from Taina Gil to discuss any/all medical information while they are present in the room.

## 2020-01-09 NOTE — DISCHARGE INSTRUCTIONS
After Hospital Care Plan:  Discharge Instructions Craniotomy  [facility]    Patient Name: Tadeo Ryder    Date of procedure: 7/3/2018  Date of discharge: 7/9/2018    Procedure: Procedure(s):  LEFT FRONTAL TEMPORAL CRANIOTOMY FOR SUBDURAL HEMATOMA  PCP: None    Follow up appointments  Follow up with your neurosurgeon in 10-14 days. Call [phone] to make an appointment as soon as you get home from the hospital.  Follow-up care is a key part of your treatment and safety. Be sure to make and go to all appointments, and call your doctor if you are having problems. It's also a good idea to know your test results and keep a list of the medicines you take. A craniotomy is surgery to open your skull to fix a problem in your brain. It can be done for many reasons. For example, you may need a craniotomy if your brain or blood vessels are damaged or if you have a tumor or an infection in your brain. You will probably feel very tired for several weeks after surgery. You may also have headaches or problems concentrating. It can take 4 to 8 weeks to recover from surgery. Your cuts (incisions) may be sore for about 5 days after surgery. You may also have numbness and shooting pains near your wound, or swelling and bruising around your eyes. As your wound starts to heal, it may begin to itch. Medicines and ice packs can help with headaches, pain, swelling, and itching. The stitches that hold your incisions together may go away on their own or will be removed in 7 to 10 days. This depends on the type of stitches the doctor uses. Staples are usually removed in 10-14 days. It is common for your scalp to swell with fluid. After the swelling goes down, you may have a dent in your head. Some kinds of plates stay attached to hold the skull flap to your head. If your head was shaved, you may wear clean hats or scarves on your head until your hair grows back.      This care sheet gives you a general idea Internal Medicine about how long it will take for you to recover. But each person recovers at a different pace. Follow the steps below to get better as quickly as possible. When to call your Neurosurgeon:   You have trouble thinking clearly.  You are sleeping more than you are awake.  You have a fever with a stiff neck or a severe headache.  You have any sudden vision changes.  Nausea or vomiting, severe headache.  Loss of bowel or bladder function, inability to urinate.  Increased weakness-greater than before your surgery.  Severe pain or pain not relieved by medications.  Signs of a blood clot in your leg-calf pain, tenderness, redness, swelling of lower leg.  Signs of infection-if your incision is red; continues to have drainage; drainage has a foul odor or if you have a persistent fever over 101 degrees for 24 hours.  You fall and hit your head. When to call your Primary Care Physician:   Concerns about medical conditions such as diabetes, high blood pressure, asthma, congestive heart failure.  Call if blood sugars are elevated, persistent headache or dizziness, coughing or congestion, constipation or diarrhea, burning with urination, abnormal heart rate. When to call 911 and go to the nearest emergency room:   Acute onset of chest pain, shortness of breath, difficulty breathing   You passed out (lost consciousness).  It is hard to think, move, speak, or see.  Your body is jerking or shaking. Activity   Rest when you feel tired. It is normal to want to sleep during the day. It is a good idea to plan to take a nap every day. Getting enough sleep will help you recover.  Try not to lie flat when you rest or sleep. You can use a wedge pillow, or you can put a rolled towel or foam padding under your pillow. You can also raise the head of your bed by putting bricks or wooden blocks under the bed legs.  After lying down, bring your head up slowly.  This can prevent headaches or dizziness.  Try to walk each day. Start by walking a little more than you did the day before. Bit by bit, increase the amount you walk. Walking boosts blood flow and helps prevent pneumonia and constipation.  Avoid heavy lifting until your doctor says it is okay.  Do not drive for 2 to 3 weeks or until your doctor says it is okay. When you begin driving again, start with short, familiar routes in the daytime.  Ask your doctor if it is safe for you to travel by plane.  Avoid risky activities, such as climbing a ladder, for 3 months after surgery.  Avoid strenuous activities, such as bicycle riding, jogging, weight lifting, or aerobic exercise, for 3 months or until your doctor says it is okay.  Do not play any rough or contact sports for 3 months or until your doctor says it is okay.  You may engage in sexual activity. Diet   Resume usual diet; drink plenty of fluids; eat foods high in fiber   It is important to have regular bowel movements. Pain medications may cause constipation. You may want to take a stool softener (such as Senokot-S or Colace) to prevent constipation.  If constipation occurs, take a laxative (such as Dulcolax tablets, Milk of Magnesia, or a suppository). Laxatives should only be used if the above preventable measures have failed and you still have not had a bowel movement after three days   Try to avoid constipation and straining with bowel movements. Incision Care      You can wash your hair 2 to 3 days after your surgery. But do not soak your head or swim for 2 to 3 weeks.  Do not dye or color your hair for 4 weeks after your surgery.  Do not rub or apply any lotions or ointments to your incision site.  Do not scrub your wound    Medicines   If your doctor or nurse practitioner prescribed antibiotics, take them as directed. Do not stop taking them just because you feel better. You need to take the full course of antibiotics.    If you get medicines to prevent seizures, take them exactly as directed.  If the doctor or nurse practitioner gave you a prescription medicine for pain, take it as prescribed.  If you are not taking a prescription pain medicine, ask your doctor or nurse practitioner if you can take an over-the-counter medicine.    Pain Medication Safety  DO:   Read the Medication Guide    Take your medicine exactly as prescribed    Store your medicine away from children and in a safe place    Call your healthcare provider for medical advice about side effects. You may report side effects to FDA at 6-161-FDA-6895.  Please be aware that many medications contain Tylenol. We do not want you to over medicate so please read the information below as a guide. Do not take more than 4 Grams of Tylenol in a 24 hour period if you are under age 79 or 3 Grams in 24 hours if you are over 79years old. (There are 1000 milligrams in one Gram)  o Percocet contains 325 mg of Tylenol per tablet (do not take more than 12 tablets in 24 hours)  o Lortab contains 500 mg of Tylenol per tablet (do not take more than 8 tablets in 24 hours)  o Norco contains 325 mg of Tylenol per tablet (do not take more than 12 tablets in 24 hours). DO NOT:   Do not give your medicine to others.  Do not take medicine unless it was prescribed for you.  Do not stop taking your medicine without talking to your healthcare provider.  Do not break, chew, crush, dissolve, or inject your medicine. If you cannot swallow your medicine whole, talk to your healthcare provider.  Do not drink alcohol while taking this medicine.  Do not take anti-inflammatory medications or aspirin unless instructed by your physician. Check BP every day, write down numbers, take to PCP for medication management.   Goal BP top number <160

## 2020-01-23 DIAGNOSIS — I10 ESSENTIAL HYPERTENSION: ICD-10-CM

## 2020-01-23 RX ORDER — LISINOPRIL 40 MG/1
40 TABLET ORAL DAILY
Qty: 15 TAB | Refills: 0 | Status: SHIPPED | OUTPATIENT
Start: 2020-01-23 | End: 2020-02-04 | Stop reason: SDUPTHER

## 2020-01-23 RX ORDER — HYDRALAZINE HYDROCHLORIDE 25 MG/1
25 TABLET, FILM COATED ORAL 3 TIMES DAILY
Qty: 15 TAB | Refills: 0 | Status: SHIPPED | OUTPATIENT
Start: 2020-01-23 | End: 2020-02-04 | Stop reason: SDUPTHER

## 2020-02-04 ENCOUNTER — OFFICE VISIT (OUTPATIENT)
Dept: FAMILY MEDICINE CLINIC | Age: 55
End: 2020-02-04

## 2020-02-04 VITALS
WEIGHT: 217.2 LBS | OXYGEN SATURATION: 99 % | RESPIRATION RATE: 16 BRPM | TEMPERATURE: 98.4 F | HEART RATE: 57 BPM | SYSTOLIC BLOOD PRESSURE: 155 MMHG | BODY MASS INDEX: 27.01 KG/M2 | DIASTOLIC BLOOD PRESSURE: 80 MMHG | HEIGHT: 75 IN

## 2020-02-04 DIAGNOSIS — I10 ESSENTIAL HYPERTENSION: Primary | ICD-10-CM

## 2020-02-04 DIAGNOSIS — I10 ESSENTIAL HYPERTENSION: ICD-10-CM

## 2020-02-04 RX ORDER — LISINOPRIL 40 MG/1
40 TABLET ORAL DAILY
Qty: 90 TAB | Refills: 1 | Status: SHIPPED | OUTPATIENT
Start: 2020-02-04 | End: 2020-07-20 | Stop reason: SDUPTHER

## 2020-02-04 RX ORDER — HYDRALAZINE HYDROCHLORIDE 25 MG/1
25 TABLET, FILM COATED ORAL 2 TIMES DAILY
Qty: 180 TAB | Refills: 1 | Status: SHIPPED | OUTPATIENT
Start: 2020-02-04 | End: 2020-07-20 | Stop reason: SDUPTHER

## 2020-02-04 NOTE — PROGRESS NOTES
Identified pt with two pt identifiers(name and ). Reviewed record in preparation for visit and have obtained necessary documentation. Chief Complaint   Patient presents with    Medication Refill     Hydralazine/ Lisinopril         Health Maintenance Due   Topic    Shingrix Vaccine Age 50> (1 of 2)       Coordination of Care Questionnaire:  :   1) Have you been to an emergency room, urgent care, or hospitalized since your last visit? If yes, where when, and reason for visit? No       2. Have seen or consulted any other health care provider since your last visit? If yes, where when, and reason for visit?   NO

## 2020-02-04 NOTE — PROGRESS NOTES
Assessment and Plan    1. Essential hypertension  At goal by well calibrated BP cuff  Wants to taper so will DC afternoon dose of hydralazine. FU 2-3 months to recheck. Labs to check for comorbid conditions. - lisinopril (PRINIVIL, ZESTRIL) 40 mg tablet; Take 1 Tab by mouth daily. Dispense: 90 Tab; Refill: 1  - hydrALAZINE (APRESOLINE) 25 mg tablet; Take 1 Tab by mouth two (2) times a day. Dispense: 180 Tab; Refill: 1  - HEPATIC FUNCTION PANEL; Future  - HEMOGLOBIN A1C WITH EAG; Future  - LIPID PANEL; Future  - METABOLIC PANEL, BASIC; Future  - MICROALBUMIN, UR, RAND W/ MICROALB/CREAT RATIO; Future      Follow-up and Dispositions    · Return in about 3 months (around 5/4/2020) for Blood pressure follow up, Medication follow up. Diagnosis and plan discussed with patient who verbillized understanding. History of present Simba Barba is a 53 y.o. male presenting for Medication Refill (Hydralazine/ Lisinopril )    Hx subdural  Evacuated in past.  7/1/2018 diagnosed  Now doing well and released by Neurosurgery and off [facility]    Hypertension  Remains on lisinopril and hydralazine. BP's 153-960 systolic. Non drinker, no sleep apnea  Does he need to stay on BP meds    Review of Systems   Constitutional: Negative for malaise/fatigue. Respiratory: Negative for cough and shortness of breath. Cardiovascular: Negative for chest pain, palpitations and leg swelling. Gastrointestinal: Negative. Genitourinary: Negative. Neurological: Negative for headaches. Psychiatric/Behavioral: Negative.           Past Medical History:   Diagnosis Date    HTN (hypertension)     Subdural hemorrhage ([facility] [address] 07/2018    [place]     Past Surgical History:   Procedure Laterality Date    HX CRANIOTOMY  07/2018     Family History   Problem Relation Age of Onset    Hypertension Mother      Social History     Socioeconomic History    Marital status:      Spouse name: Not on file    Number of children: Not on file    Years of education: Not on file    Highest education level: Not on file   Occupational History    Not on file   Social Needs    Financial resource strain: Not on file    Food insecurity:     Worry: Not on file     Inability: Not on file    Transportation needs:     Medical: Not on file     Non-medical: Not on file   Tobacco Use    Smoking status: Never Smoker    Smokeless tobacco: Never Used   Substance and Sexual Activity    Alcohol use: No    Drug use: No    Sexual activity: Yes     Partners: Female   Lifestyle    Physical activity:     Days per week: Not on file     Minutes per session: Not on file    Stress: Not on file   Relationships    Social connections:     Talks on phone: Not on file     Gets together: Not on file     Attends Religion service: Not on file     Active member of club or organization: Not on file     Attends meetings of clubs or organizations: Not on file     Relationship status: Not on file    Intimate partner violence:     Fear of current or ex partner: Not on file     Emotionally abused: Not on file     Physically abused: Not on file     Forced sexual activity: Not on file   Other Topics Concern    Not on file   Social History Narrative    Not on file         Prior to Admission medications    Medication Sig Start Date End Date Taking? Authorizing Provider   lisinopril (PRINIVIL, ZESTRIL) 40 mg tablet Take 1 Tab by mouth daily. 2/4/20  Yes Jorgito Easley MD   hydrALAZINE (APRESOLINE) 25 mg tablet Take 1 Tab by mouth two (2) times a day. 2/4/20  Yes Jorgito Easley MD   acetaminophen (TYLENOL) 325 mg tablet Take 2 Tabs by mouth every four (4) hours as needed. 7/11/18  Yes Tiffany Toro MD   lisinopril (PRINIVIL, ZESTRIL) 40 mg tablet Take 1 Tab by mouth daily. NEEDS LABS PRIOR TO FURTHER REFILLS 1/23/20 2/4/20  Debbie Gonzales MD   hydrALAZINE (APRESOLINE) 25 mg tablet Take 1 Tab by mouth three (3) times daily.  NEEDS LABS PRIOR TO FURTHER REFILLS 1/23/20 2/4/20  Ynes An MD   levETIRAcetam (KEPPRA) 250 mg tablet Take 250 mg by mouth two (2) times a day. Indications: not taking  2/4/20  Provider, Historical   amLODIPine (NORVASC) 5 mg tablet Take 1 Tab by mouth daily. Patient not taking: Reported on 2/4/2020 12/20/18 2/4/20  Nara Russell MD   levETIRAcetam (KEPPRA) 500 mg tablet Take 1,000 mg by mouth two (2) times a day. Indications: not taking  2/4/20  Provider, Historical        No Known Allergies    Vitals:    02/04/20 0859 02/04/20 0910   BP: 167/85 155/80   Pulse: (!) 57    Resp: 16    Temp: 98.4 °F (36.9 °C)    TempSrc: Oral    SpO2: 99%    Weight: 217 lb 3.2 oz (98.5 kg)    Height: 6' 3\" (1.905 m)      Body mass index is 27.15 kg/m². Objective  Physical Exam  Vitals signs and nursing note reviewed. Constitutional:       Appearance: Normal appearance. He is not toxic-appearing. HENT:      Head: Normocephalic and atraumatic. Neck:      Musculoskeletal: No muscular tenderness. Cardiovascular:      Rate and Rhythm: Normal rate and regular rhythm. Heart sounds: Normal heart sounds. No murmur. No gallop. Pulmonary:      Effort: Pulmonary effort is normal. No respiratory distress. Breath sounds: Normal breath sounds. No wheezing, rhonchi or rales. Lymphadenopathy:      Cervical: No cervical adenopathy. Neurological:      Mental Status: He is alert. Psychiatric:         Mood and Affect: Mood normal.         Behavior: Behavior normal.         Thought Content:  Thought content normal.         Judgment: Judgment normal.

## 2020-02-05 LAB
ALBUMIN SERPL-MCNC: 4.8 G/DL (ref 3.8–4.9)
ALBUMIN/CREAT UR: <4 MG/G CREAT (ref 0–29)
ALP SERPL-CCNC: 98 IU/L (ref 39–117)
ALT SERPL-CCNC: 30 IU/L (ref 0–44)
AST SERPL-CCNC: 18 IU/L (ref 0–40)
BILIRUB DIRECT SERPL-MCNC: 0.29 MG/DL (ref 0–0.4)
BILIRUB SERPL-MCNC: 1.5 MG/DL (ref 0–1.2)
BUN SERPL-MCNC: 15 MG/DL (ref 6–24)
BUN/CREAT SERPL: 15 (ref 9–20)
CALCIUM SERPL-MCNC: 9.6 MG/DL (ref 8.7–10.2)
CHLORIDE SERPL-SCNC: 104 MMOL/L (ref 96–106)
CHOLEST SERPL-MCNC: 182 MG/DL (ref 100–199)
CO2 SERPL-SCNC: 23 MMOL/L (ref 20–29)
CREAT SERPL-MCNC: 1.02 MG/DL (ref 0.76–1.27)
CREAT UR-MCNC: 82.7 MG/DL
EST. AVERAGE GLUCOSE BLD GHB EST-MCNC: 103 MG/DL
GLUCOSE SERPL-MCNC: 112 MG/DL (ref 65–99)
HBA1C MFR BLD: 5.2 % (ref 4.8–5.6)
HDLC SERPL-MCNC: 37 MG/DL
LDLC SERPL CALC-MCNC: 112 MG/DL (ref 0–99)
MICROALBUMIN UR-MCNC: <3 UG/ML
POTASSIUM SERPL-SCNC: 5.6 MMOL/L (ref 3.5–5.2)
PROT SERPL-MCNC: 7.3 G/DL (ref 6–8.5)
SODIUM SERPL-SCNC: 141 MMOL/L (ref 134–144)
TRIGL SERPL-MCNC: 166 MG/DL (ref 0–149)
VLDLC SERPL CALC-MCNC: 33 MG/DL (ref 5–40)

## 2020-02-05 NOTE — PROGRESS NOTES
Your blood work is OK. The kidneys are normal.  The liver is OK. The potassium level is elevated but I think this a false elevation. We see this when blood is drawn into vacuum tubes. The red blood cells rupture and elevate the potassium level. The cholesterol is mildly elevated. It is high enough that we should discuss cholesterol treatment at your next appointment. See me as we discussed and we'll see how the BP does on the lower dose of hydralazine.   [facility]

## 2021-04-27 ENCOUNTER — OFFICE VISIT (OUTPATIENT)
Dept: FAMILY MEDICINE CLINIC | Age: 56
End: 2021-04-27
Payer: COMMERCIAL

## 2021-04-27 VITALS
TEMPERATURE: 98.3 F | RESPIRATION RATE: 16 BRPM | HEIGHT: 75 IN | DIASTOLIC BLOOD PRESSURE: 93 MMHG | SYSTOLIC BLOOD PRESSURE: 157 MMHG | OXYGEN SATURATION: 98 % | BODY MASS INDEX: 25.56 KG/M2 | HEART RATE: 84 BPM | WEIGHT: 205.6 LBS

## 2021-04-27 DIAGNOSIS — I10 ESSENTIAL HYPERTENSION: Primary | ICD-10-CM

## 2021-04-27 DIAGNOSIS — Z12.5 SCREENING FOR MALIGNANT NEOPLASM OF PROSTATE: ICD-10-CM

## 2021-04-27 DIAGNOSIS — Z11.59 ENCOUNTER FOR HEPATITIS C SCREENING TEST FOR LOW RISK PATIENT: ICD-10-CM

## 2021-04-27 DIAGNOSIS — Z23 ENCOUNTER FOR IMMUNIZATION: ICD-10-CM

## 2021-04-27 LAB
ANION GAP SERPL CALC-SCNC: 6 MMOL/L (ref 5–15)
BUN SERPL-MCNC: 25 MG/DL (ref 6–20)
BUN/CREAT SERPL: 28 (ref 12–20)
CALCIUM SERPL-MCNC: 9.8 MG/DL (ref 8.5–10.1)
CHLORIDE SERPL-SCNC: 105 MMOL/L (ref 97–108)
CO2 SERPL-SCNC: 27 MMOL/L (ref 21–32)
CREAT SERPL-MCNC: 0.9 MG/DL (ref 0.7–1.3)
CREAT UR-MCNC: 86 MG/DL
GLUCOSE SERPL-MCNC: 119 MG/DL (ref 65–100)
HCV AB SER IA-ACNC: 5.06 INDEX
HCV AB SERPL QL IA: REACTIVE
HCV COMMENT,HCGAC: ABNORMAL
MICROALBUMIN UR-MCNC: <0.5 MG/DL
MICROALBUMIN/CREAT UR-RTO: NORMAL MG/G (ref 0–30)
POTASSIUM SERPL-SCNC: 4.6 MMOL/L (ref 3.5–5.1)
PSA SERPL-MCNC: 2.9 NG/ML (ref 0.01–4)
SODIUM SERPL-SCNC: 138 MMOL/L (ref 136–145)

## 2021-04-27 PROCEDURE — 99213 OFFICE O/P EST LOW 20 MIN: CPT | Performed by: FAMILY MEDICINE

## 2021-04-27 RX ORDER — LISINOPRIL 40 MG/1
40 TABLET ORAL DAILY
Qty: 90 TAB | Refills: 1 | Status: SHIPPED | OUTPATIENT
Start: 2021-04-27 | End: 2021-10-14 | Stop reason: SDUPTHER

## 2021-04-27 RX ORDER — HYDRALAZINE HYDROCHLORIDE 25 MG/1
25 TABLET, FILM COATED ORAL 2 TIMES DAILY
Qty: 180 TAB | Refills: 1 | Status: SHIPPED | OUTPATIENT
Start: 2021-04-27 | End: 2021-10-14 | Stop reason: SDUPTHER

## 2021-04-27 NOTE — PATIENT INSTRUCTIONS
The goal blood pressure for most patients is 140/90. The whole point of treating blood pressure is to prevent strokes, heart attacks and kidney damage. Persistently elevated blood pressure damages blood vessels and can lead to catastrophic heart problems and strokes. Recommendations for Hypertension (elevated blood pressure): · Purchase a blood pressure cuff that goes around your upper arm and check blood pressure at least 3 times per week. Write down your numbers for review. Check blood pressure in the morning and evening. Rest for 5 minutes with feet elevated and arm resting on a table (at mid-chest level) when checking blood pressure · Exercise 30-60 minutes most days of the week, preferably with a mix of cardiovascular and strength training. Exercise can improve blood pressure, even if you do not lose weight! · Limit alcohol intake to less than 3-4 drinks per week. · Avoid tobacco products · Avoid illegal drugs especially amphetamines · DASH diet - includes fruits, vegetables, fiber, and less than 2000 mg sodium (salt) daily. · Try to eat a low sugar diet. Sugar worsens diabetes and activates kidney hormones that raise blood pressure. · Avoid non-steroidal inflammatory medications (NSAIDS) such as ibuprofen, advil, motrin, aleve, and naproxyn as these may increase blood pressure if used long-term · Avoid OTC decongestants such as pseudopherine, phenylephrine, Afrin · Take your blood pressure medicine (and aspirin if prescribed) religiously Colon Cancer Screening: Care Instructions Your Care Instructions Colorectal cancer occurs in the colon or rectum. That's the lower part of your digestive system. It is the second-leading cause of cancer deaths in the [place]. It often starts with small growths called polyps in the colon or rectum. Polyps are usually found with screening tests.  Depending on the type of test, any polyps found may be removed during the tests. Colorectal cancer usually does not cause symptoms at first. But regular tests can help find it early, before it spreads and becomes harder to treat. Your risk for colorectal cancer gets higher as you get older. Some experts say that adults should start regular screening at age 48 and stop at age 76. Others say to start before age 48 or continue after age 76. Talk with your doctor about your risk and when to start and stop screening. You may have one of several tests. Follow-up care is a key part of your treatment and safety. Be sure to make and go to all appointments, and call your doctor if you are having problems. It's also a good idea to know your test results and keep a list of the medicines you take. What are the main screening tests for colon cancer? The screening tests are: 
Stool tests. These include the guaiac fecal occult blood test (gFOBT), the fecal immunochemical test (FIT), and the combined fecal immunochemical test and stool DNA test (FIT-DNA). These tests check stool samples for signs of cancer. If your test is positive, you will need to have a colonoscopy. Sigmoidoscopy. This test lets your doctor look at the lining of your rectum and the lowest part of your colon. Your doctor uses a lighted tube called a sigmoidoscope. This test can't find cancers or polyps in the upper part of your colon. In some cases, polyps that are found can be removed. But if your doctor finds polyps, you will need to have a colonoscopy to check the upper part of your colon. Colonoscopy. This test lets your doctor look at the lining of your rectum and your entire colon. The doctor uses a thin, flexible tool called a colonoscope. It can also be used to remove polyps or get a tissue sample (biopsy). A less common test is CT colonography (CTC). It's also called virtual colonoscopy. Who should be screened for colorectal cancer? Your risk for colorectal cancer gets higher as you get older.  Some experts say that adults should start regular screening at age 48 and stop at age 76. Others say to start before age 48 or continue after age 76. Talk with your doctor about your risk and when to start and stop screening. How often you need screening depends on the type of test you get: 
Stool tests. Every 1 or 2 years for FIT or gFOBT. Every 3 years for sDNA, also called FIT-DNA. Tests that look inside the colon. Every 5 or 10 years for sigmoidoscopy. Every 5 years for CT colonography (virtual colonoscopy). Every 10 years for colonoscopy. Experts agree that people at higher risk may need to be tested sooner. This includes people who have a strong family history of colon cancer. Talk to your doctor about which test is best for you and when to be tested. When should you call for help? Watch closely for changes in your health, and be sure to contact your doctor if: 
  · You have any changes in your bowel habits.  
  · You have any problems. Where can you learn more? Go to http://www.gray.com/ Enter M541 in the search box to learn more about \"Colon Cancer Screening: Care Instructions. \" Current as of: December 17, 2020               Content Version: 12.8 © 0717-9905 [employer]. Care instructions adapted under license by [employer] (which disclaims liability or warranty for this information). If you have questions about a medical condition or this instruction, always ask your healthcare professional. [place] [address] any warranty or liability for your use of this information. Learning About Colonoscopy What is a colonoscopy? A colonoscopy is a test (also called a procedure) that lets a doctor look inside your large intestine. The doctor uses a thin, lighted tube called a colonoscope. The doctor uses it to look for small growths called polyps, colon or rectal cancer (colorectal cancer), or other problems like bleeding.  
During the procedure, the doctor can take samples of tissue. The samples can then be checked for cancer or other conditions. The doctor can also take out polyps. How is a colonoscopy done? This procedure is done in a doctor's office or a clinic or hospital. You will get medicine to help you relax and not feel pain. Some people find that they don't remember having the test because of the medicine. The doctor gently moves the colonoscope, or scope, through the colon. The scope is also a small video camera. It lets the doctor see the colon and take pictures. How do you prepare for the procedure? You need to clean out your colon before the procedure so the doctor can see all of your colon. This process may start a day or two before the test. This depends on which \"colon prep\" your doctor recommends. To clean your colon, you stop eating solid foods and drink only clear liquids. You can have water, tea, coffee, clear juices, clear broths, flavored ice pops, and gelatin (such as Jell-O). Do not drink anything red or purple. The day or night before the procedure, you drink a large amount of a special liquid. This causes loose, frequent stools. You will go to the bathroom a lot. It's very important to drink all of the liquid. If you have problems drinking it, call your doctor. Some people don't go to work or do their usual activities on the day of the prep. Arrange to have someone take you home after the test. 
What can you expect after a colonoscopy? Your doctor will tell you when you can eat and do your usual activities. Drink a lot of fluid after the test to replace the fluids you may have lost during the colon prep. But don't drink alcohol. Your doctor will talk to you about when you'll need your next colonoscopy. The results of your test and your risk for colorectal cancer will help your doctor decide how often you need to be checked. After the test, you may be bloated or have gas pains. You may need to pass gas. If a biopsy was done or a polyp was removed, you may have streaks of blood in your stool (feces) for a few days. If polyps were taken out, your doctor may tell you to avoid taking aspirin and nonsteroidal anti-inflammatory drugs (NSAIDs) for 7 to 14 days. Problems such as heavy rectal bleeding may not occur until several weeks after the test. This isn't common. But it can happen after polyps are removed. Follow-up care is a key part of your treatment and safety. Be sure to make and go to all appointments, and call your doctor if you are having problems. It's also a good idea to know your test results and keep a list of the medicines you take. Where can you learn more? Go to http://www.gray.com/ Enter X198 in the search box to learn more about \"Learning About Colonoscopy. \" Current as of: December 17, 2020               Content Version: 12.8 © 5038-5866 [employer]. Care instructions adapted under license by [employer] (which disclaims liability or warranty for this information). If you have questions about a medical condition or this instruction, always ask your healthcare professional. [place] [address] any warranty or liability for your use of this information.

## 2021-04-27 NOTE — PROGRESS NOTES
Jayjay Lynn  63 y.o. male  1965  [place]  [facility]  Progress Note     Encounter Date: 2021    Assessment and Plan:     Encounter Diagnoses     ICD-10-CM ICD-9-CM   1. Essential hypertension  I10 401.9   2. Screening for malignant neoplasm of prostate  Z12.5 V76.44   3. Encounter for hepatitis C screening test for low risk patient  Z11.59 V73.89   4. Encounter for immunization  Z23 V03.89       1. Essential hypertension  At goal by home cuff, continue current meds  - hydrALAZINE (APRESOLINE) 25 mg tablet; Take 1 Tab by mouth two (2) times a day. Dispense: 180 Tab; Refill: 1  - lisinopriL (PRINIVIL, ZESTRIL) 40 mg tablet; Take 1 Tab by mouth daily. Dispense: 90 Tab; Refill: 1  - METABOLIC PANEL, BASIC; Future  - MICROALBUMIN, UR, RAND W/ MICROALB/CREAT RATIO; Future    2. Screening for malignant neoplasm of prostate  screening  - PSA, DIAGNOSTIC (PROSTATE SPECIFIC AG); Future    3. Encounter for hepatitis C screening test for low risk patient  screening  - HEPATITIS C AB; Future    4. Encounter for immunization  Declines COVID vaccine. - varicella-zoster recombinant, PF, (SHINGRIX) 50 mcg/0.5 mL susr injection; 0.5 mL by IntraMUSCular route once for 1 dose. Repeat second dose in 6 months. Dispense: 0.5 mL; Refill: 1  - diph,Pertuss,Acell,,Tet Vac-PF (ADACEL) 2 Lf-(2.5-5-3-5 mcg)-5Lf/0.5 mL susp; 0.5 mL by IntraMUSCular route once for 1 dose. Dispense: 1 Syringe; Refill: 0    Discussed colonoscopy. He will consider. Knows he only needs to call me to get this set up. I have discussed the diagnosis with the patient and the intended plan as seen in the above orders. he has expressed understanding. The patient has received an after-visit summary and questions were answered concerning future plans. I have discussed medication side effects and warnings with the patient as well.     Electronically Signed: Rubi Britt MD    Current Medications after this visit     Current Outpatient Medications   Medication Sig    hydrALAZINE (APRESOLINE) 25 mg tablet Take 1 Tab by mouth two (2) times a day.  lisinopriL (PRINIVIL, ZESTRIL) 40 mg tablet Take 1 Tab by mouth daily.  varicella-zoster recombinant, PF, (SHINGRIX) 50 mcg/0.5 mL susr injection 0.5 mL by IntraMUSCular route once for 1 dose. Repeat second dose in 6 months.  diph,Pertuss,Acell,,Tet Vac-PF (ADACEL) 2 Lf-(2.5-5-3-5 mcg)-5Lf/0.5 mL susp 0.5 mL by IntraMUSCular route once for 1 dose.  acetaminophen (TYLENOL) 325 mg tablet Take 2 Tabs by mouth every four (4) hours as needed. No current facility-administered medications for this visit. Medications Discontinued During This Encounter   Medication Reason    hydrALAZINE (APRESOLINE) 25 mg tablet REORDER    lisinopriL (PRINIVIL, ZESTRIL) 40 mg tablet REORDER     ~~~~~~~~~~~~~~~~~~~~~~~~~~~~~~~~~~~~~~~~~~~~~~~~~~~~~~~~~~~    Chief Complaint   Patient presents with    Hypertension    Medication Refill       History provided by patient  History of Present Illness   Merrill Ng is a 63 y.o. male who presents to clinic today for:  Hypertension and Medication Refill    Hypertension  At goal by home BP's  Brings home BP cuff  Takes meds consistently    Never Colonoscopy    Health Maintenance  discussed colonoscopy, Completed HM gaps at today's visit  Health Maintenance Due   Topic Date Due    Hepatitis C Screening  Never done    COVID-19 Vaccine (1) Never done    DTaP/Tdap/Td series (1 - Tdap) Never done    Shingrix Vaccine Age 50> (1 of 2) Never done    Colorectal Cancer Screening Combo  Never done     Review of Systems   Review of Systems   Constitutional: Negative for chills, fever and weight loss. HENT: Negative for congestion and sore throat. Respiratory: Negative for cough and shortness of breath. Cardiovascular: Negative for chest pain and leg swelling. Gastrointestinal: Negative. Genitourinary: Negative.     Musculoskeletal: Positive for back pain. Psychiatric/Behavioral: Negative. Vitals/Objective:     Vitals:    04/27/21 0853 04/27/21 0912   BP: (!) 164/86 (!) 157/93   Pulse: 84    Resp: 16    Temp: 98.3 °F (36.8 °C)    TempSrc: Temporal    SpO2: 98%    Weight: 205 lb 9.6 oz (93.3 kg)    Height: 6' 3\" (1.905 m)      Body mass index is 25.7 kg/m². Wt Readings from Last 3 Encounters:   04/27/21 205 lb 9.6 oz (93.3 kg)   02/04/20 217 lb 3.2 oz (98.5 kg)   08/13/19 222 lb (100.7 kg)         Objective  Physical Exam  Vitals signs and nursing note reviewed. Constitutional:       Appearance: Normal appearance. He is not toxic-appearing. HENT:      Head: Normocephalic and atraumatic. Neck:      Musculoskeletal: No muscular tenderness. Cardiovascular:      Rate and Rhythm: Normal rate and regular rhythm. Heart sounds: Normal heart sounds. No murmur. No gallop. Pulmonary:      Effort: Pulmonary effort is normal. No respiratory distress. Breath sounds: Normal breath sounds. No wheezing, rhonchi or rales. Lymphadenopathy:      Cervical: No cervical adenopathy. Neurological:      Mental Status: He is alert. Psychiatric:         Mood and Affect: Mood normal.         Behavior: Behavior normal.         Thought Content: Thought content normal.         Judgment: Judgment normal.           No results found for this or any previous visit (from the past 24 hour(s)). Disposition     Follow-up and Dispositions  ·   Return in about 6 months (around 10/27/2021) for Blood pressure follow up. No future appointments. History   Patient's past medical, surgical and family histories were reviewed and updated.     Past Medical History:   Diagnosis Date    HTN (hypertension)     Subdural hemorrhage ([address] 07/2018    [place]     Past Surgical History:   Procedure Laterality Date    HX CRANIOTOMY  07/2018     Family History   Problem Relation Age of Onset    Hypertension Mother      Social History     Tobacco Use  Smoking status: Never Smoker    Smokeless tobacco: Never Used   Substance Use Topics    Alcohol use: No    Drug use: No       Allergies   No Known Allergies

## 2021-04-27 NOTE — PROGRESS NOTES
Identified pt with two pt identifiers(name and ). Reviewed record in preparation for visit and have obtained necessary documentation. Chief Complaint   Patient presents with    Hypertension    Medication Refill        Health Maintenance Due   Topic    Hepatitis C Screening     COVID-19 Vaccine (1)    DTaP/Tdap/Td series (1 - Tdap)    Shingrix Vaccine Age 49> (1 of 2)    Colorectal Cancer Screening Combo        Coordination of Care Questionnaire:  :   1) Have you been to an emergency room, urgent care, or hospitalized since your last visit? If yes, where when, and reason for visit? No       2. Have seen or consulted any other health care provider since your last visit? If yes, where when, and reason for visit?  No

## 2021-04-29 ENCOUNTER — TELEPHONE (OUTPATIENT)
Dept: FAMILY MEDICINE CLINIC | Age: 56
End: 2021-04-29

## 2021-04-29 DIAGNOSIS — Z11.59 ENCOUNTER FOR HEPATITIS C SCREENING TEST FOR LOW RISK PATIENT: Primary | ICD-10-CM

## 2021-04-29 NOTE — TELEPHONE ENCOUNTER
----- Message from [place] sent at 4/29/2021  2:57 PM EDT -----  Regarding: Dr. Ramila Rodríguez  Patient return call    Caller's first and last name and relationship (if not the patient): Park Rodriguez contact number(s): [phone]      Whose call is being returned: Dr. Alejandro Brine      Details to clarify the request:  If no answer, please leave a detailed voice mail message.        [place]

## 2021-04-29 NOTE — PROGRESS NOTES
HCV AB +  Called spouse with result  To FU for HCV RNA and genotype  [facility]

## 2021-07-02 LAB
HCV GENOTYPE: NORMAL
HCV RNA SERPL NAA+PROBE-ACNC: NORMAL IU/ML
HCV RNA SERPL NAA+PROBE-LOG IU: NORMAL LOG10 IU/ML
TEST INFORMATION: NORMAL

## 2021-07-09 ENCOUNTER — TELEPHONE (OUTPATIENT)
Dept: FAMILY MEDICINE CLINIC | Age: 56
End: 2021-07-09

## 2021-07-09 NOTE — TELEPHONE ENCOUNTER
Called with Hep C result  Negative for Hep C in blood so either had virus and cleared it or had false pos test.    No additional treatment or testing necessary  [facility]

## 2021-07-09 NOTE — TELEPHONE ENCOUNTER
----- Message from Zohaib Aj sent at 7/9/2021  8:19 AM EDT -----  Regarding: Dr. Bradley Sosa  Patient return call    Caller's first and last name and relationship (if not the patient):Brenda Chambers (Spouse)      Best contact number(s): [phone]      Whose call is being returned: pcp      Details to clarify the request: lab results      Zohaib Aj

## 2021-09-17 NOTE — CONSULTS
[address]    Elsa Girard  MR#: [id number]  : 1965  ACCOUNT #: [de-identified]   DATE OF SERVICE: 2018    REASON FOR CONSULTATION:  Subdural hematoma. HISTORY OF PRESENT ILLNESS:  This is a 23-year-old gentleman with no significant medical history who, 1 week ago, was deep diving to approximately 12 feet. He says that his headache started shortly thereafter, that he did not feel well afterwards. He has dove in the past, but he said that this time he experienced a different sensation than he has ever had before. He has had difficulty with headaches since that time. Yesterday, he had 1 episode of slurred speech that lasted approximately 10 minutes and then he had a second episode today that lasted approximately 2-3 minutes. He came to the emergency room for further evaluation. He denies any neck pain, has not noticed any weakness, no numbness or tingling, has never had any history of this in the past.  Workup was at [facility] Emergency [facility] and included a head CT that revealed a 6 mm subdural hematoma with some mass effect, basal cisterns open. There did appear to be some subacute component to it as well. He was transferred to [facility] for further evaluation. Of note, he is on Cardene for blood pressure control. PAST MEDICAL HISTORY:  None. PAST SURGICAL HISTORY:  None. ALLERGIES:  NO KNOWN DRUG ALLERGIES. MEDICATIONS:  No home medications. He has taken ibuprofen for his headache. FAMILY HISTORY:  Denies family history of stroke, cancer, cardiac disease. SOCIAL HISTORY:  He is . No alcohol or tobacco use. 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 No vision changes, hearing difficulty, chest pain, shortness of breath, abdominal pain, urinary dysfunction, numbness, muscle spasm, or skin eruption.       PHYSICAL EXAMINATION:    VITAL SIGNS: Temperature 99.2, blood pressure on Cardene drip 136/85, pulse 88. GENERAL:  This is a well-developed, well-nourished gentleman in no apparent distress. NEUROLOGIC:  Alert, oriented x3, normal affect, slight expressive aphasia, conjugate gaze, symmetric face. No pronator drift. Full strength in bilateral upper extremities, deltoids, biceps, triceps, wrist extensors, hand intrinsics. Full strength in bilateral lower extremities, hip flexors, quadriceps, tibialis anterior, gastrocnemius, EHL. Sensory grossly intact to light touch. IMAGING STUDIES:  He has a head CT that shows an acute with subacute component left subdural hematoma approximately 6 mm in thickness with some mass effect but no significant midline shift. Basal cisterns are open. No other areas of hemorrhage. ASSESSMENT AND PLAN:  This is a 69-year-old gentleman who started having a headache approximately 1 week ago. We will get a CTA to look for any vascular malformation as well as a repeat head CT in the morning. He will continue on the Cardene drip to keep his blood pressure less than 160 and he will have q.1 hour neuro checks overnight. I will review of his imaging studies in the morning and discuss further treatment options with him at that time. Thank you for this consultation.       MD ELVER Latif /   D: 07/01/2018 20:30     T: 07/02/2018 06:09  JOB #: [id number] see your ob in 1week  check your blood pressures 3xday: make sure you are rested 30min before checking the blood pressures and record for the nurse to review  return to the emergency room if your blood pressure is persistently 160/100 no sex nothing in vagina no heavy lifting no pushing no straining no strenuous activities  pain medication as needed; stool softener; dulcolax as needed if constipated  walk for exercise: helps recovery   continue prenatal vitamins daily especially whole course of breastfeeding  see your OB in the office for follow up post partum check call the office set up appointment in 1week  clean wound daily with soap and water; please note wound for redness or swelling; if noted go to the office right away so the doctor can evaluate the wound for possible wound infection iron supplement

## 2022-03-19 PROBLEM — S06.5XAA SUBDURAL HEMATOMA (HCC): Status: ACTIVE | Noted: 2018-07-01

## 2022-03-20 PROBLEM — I62.00 SUBDURAL HEMORRHAGE (HCC): Status: ACTIVE | Noted: 2018-08-31

## 2022-03-20 PROBLEM — I10 HTN (HYPERTENSION), BENIGN: Status: ACTIVE | Noted: 2018-09-06

## 2022-04-25 ENCOUNTER — OFFICE VISIT (OUTPATIENT)
Dept: FAMILY MEDICINE CLINIC | Age: 57
End: 2022-04-25
Payer: COMMERCIAL

## 2022-04-25 VITALS
HEIGHT: 75 IN | SYSTOLIC BLOOD PRESSURE: 137 MMHG | HEART RATE: 67 BPM | RESPIRATION RATE: 16 BRPM | OXYGEN SATURATION: 99 % | TEMPERATURE: 98.3 F | DIASTOLIC BLOOD PRESSURE: 89 MMHG | WEIGHT: 220.8 LBS | BODY MASS INDEX: 27.45 KG/M2

## 2022-04-25 DIAGNOSIS — I10 ESSENTIAL HYPERTENSION: Primary | ICD-10-CM

## 2022-04-25 PROCEDURE — 99213 OFFICE O/P EST LOW 20 MIN: CPT | Performed by: FAMILY MEDICINE

## 2022-04-25 RX ORDER — HYDRALAZINE HYDROCHLORIDE 25 MG/1
25 TABLET, FILM COATED ORAL 2 TIMES DAILY
Qty: 180 TABLET | Refills: 1 | Status: SHIPPED | OUTPATIENT
Start: 2022-04-25 | End: 2022-10-26 | Stop reason: SDUPTHER

## 2022-04-25 RX ORDER — LISINOPRIL 40 MG/1
40 TABLET ORAL DAILY
Qty: 90 TABLET | Refills: 1 | Status: SHIPPED | OUTPATIENT
Start: 2022-04-25 | End: 2022-10-26 | Stop reason: SDUPTHER

## 2022-04-25 NOTE — PATIENT INSTRUCTIONS
The goal blood pressure for most patients is 140/90. The whole point of treating blood pressure is to prevent strokes, heart attacks and kidney damage. Persistently elevated blood pressure damages blood vessels and can lead to catastrophic heart problems and strokes. Recommendations for Hypertension (elevated blood pressure):    · Purchase a blood pressure cuff that goes around your upper arm and check blood pressure at least 3 times per week. Write down your numbers for review. Check blood pressure in the morning and evening. Rest for 5 minutes with feet elevated and arm resting on a table (at mid-chest level) when checking blood pressure    · Exercise 30-60 minutes most days of the week, preferably with a mix of cardiovascular and strength training. Exercise can improve blood pressure, even if you do not lose weight! · Limit alcohol intake to less than 3-4 drinks per week. · Avoid tobacco products    · Avoid illegal drugs especially amphetamines  · DASH diet - includes fruits, vegetables, fiber, and less than 2000 mg sodium (salt) daily. · Try to eat a low sugar diet. Sugar worsens diabetes and activates kidney hormones that raise blood pressure. · Avoid non-steroidal inflammatory medications (NSAIDS) such as ibuprofen, advil, motrin, aleve, and naproxyn as these may increase blood pressure if used long-term    · Avoid OTC decongestants such as pseudopherine, phenylephrine, Afrin  · Take your blood pressure medicine (and aspirin if prescribed) religiously      Skin cancer prevention    It's possible to prevent skin cancer. Current recommendations include avoiding sun in the peak hours of the day, wearing hats and long sleeves in the sun and using sun blocks regularly. Patient with sun damaged skin or previously skin cancer should get yearly skin exams.   Also, there is very good evidence that Vitamin B3 in the form of Nicotinamide 500mg twice a day prevents non melanoma skin cancers and lowers the rate of those cancers 20-30% over time. Nicotinamide (which is the same as Niacinamide) is available on line and is relatively inexpensive. It is also found in many B complex vitamins such as NATURE MADE Stress B complex but at a lower dose. No prescription is necessary for Nicotinamide.

## 2022-04-25 NOTE — PROGRESS NOTES
Ariella Wilson  61 y.o. male  1965  GEL:[id number]  [facility]  Progress Note     Encounter Date: 4/25/2022    Assessment and Plan:     Encounter Diagnoses     ICD-10-CM ICD-9-CM   1. Essential hypertension  I10 401.9       1. Essential hypertension  At goal continue meds  May call or use my chart if wants to set up colon cancer screening.  - hydrALAZINE (APRESOLINE) 25 mg tablet; Take 1 Tablet by mouth two (2) times a day. Dispense: 180 Tablet; Refill: 1  - lisinopriL (PRINIVIL, ZESTRIL) 40 mg tablet; Take 1 Tablet by mouth daily. Dispense: 90 Tablet; Refill: 1  - METABOLIC PANEL, BASIC  - MICROALBUMIN, UR, RAND W/ MICROALB/CREAT RATIO      I have discussed the diagnosis with the patient and the intended plan as seen in the above orders. he has expressed understanding. The patient has received an after-visit summary and questions were answered concerning future plans. I have discussed medication side effects and warnings with the patient as well. Electronically Signed: Layne Oden MD    Current Medications after this visit     Current Outpatient Medications   Medication Sig    hydrALAZINE (APRESOLINE) 25 mg tablet Take 1 Tablet by mouth two (2) times a day.  lisinopriL (PRINIVIL, ZESTRIL) 40 mg tablet Take 1 Tablet by mouth daily.  acetaminophen (TYLENOL) 325 mg tablet Take 2 Tabs by mouth every four (4) hours as needed. No current facility-administered medications for this visit.      Medications Discontinued During This Encounter   Medication Reason    hydrALAZINE (APRESOLINE) 25 mg tablet REORDER    lisinopriL (PRINIVIL, ZESTRIL) 40 mg tablet REORDER     ~~~~~~~~~~~~~~~~~~~~~~~~~~~~~~~~~~~~~~~~~~~~~~~~~~~~~~~~~~~    Chief Complaint   Patient presents with    Medication Refill    Hypertension       History provided by patient  History of Present Illness   Ariella Wilson is a 61 y.o. male who presents to clinic today for:  Medication Refill and Hypertension    Hypertension  At goal  115/81 This AM  BP's on his home cuff memory are all good. Meds unchanged    Never colonoscopy  Screening discussed at length  He'll consider. Health Maintenance  Will do at future visit  Health Maintenance Due   Topic Date Due    COVID-19 Vaccine (1) Never done    DTaP/Tdap/Td series (1 - Tdap) Never done    Colorectal Cancer Screening Combo  Never done    Shingrix Vaccine Age 50> (1 of 2) Never done    Depression Screen  04/27/2022     Review of Systems   Review of Systems   Respiratory: Negative for cough and shortness of breath. Cardiovascular: Negative for chest pain and leg swelling. Psychiatric/Behavioral: Negative. Vitals/Objective:     Vitals:    04/25/22 1132 04/25/22 1136   BP: (!) 159/102 137/89   Pulse: 67    Resp: 16    Temp: 98.3 °F (36.8 °C)    TempSrc: Temporal    SpO2: 99%    Weight: 220 lb 12.8 oz (100.2 kg)    Height: 6' 3\" (1.905 m)      Body mass index is 27.6 kg/m². Wt Readings from Last 3 Encounters:   04/25/22 220 lb 12.8 oz (100.2 kg)   04/27/21 205 lb 9.6 oz (93.3 kg)   02/04/20 217 lb 3.2 oz (98.5 kg)         Objective  Physical Exam  Vitals and nursing note reviewed. Constitutional:       Appearance: Normal appearance. He is not toxic-appearing. HENT:      Head: Normocephalic and atraumatic. Cardiovascular:      Rate and Rhythm: Normal rate and regular rhythm. Heart sounds: Normal heart sounds. No murmur heard. No gallop. Pulmonary:      Effort: Pulmonary effort is normal. No respiratory distress. Breath sounds: Normal breath sounds. No wheezing, rhonchi or rales. Musculoskeletal:      Cervical back: No muscular tenderness. Lymphadenopathy:      Cervical: No cervical adenopathy. Neurological:      Mental Status: He is alert. Psychiatric:         Mood and Affect: Mood normal.         Behavior: Behavior normal.         Thought Content:  Thought content normal.         Judgment: Judgment normal. No results found for this or any previous visit (from the past 24 hour(s)). Disposition     Follow-up and Dispositions  ·   Return in about 6 months (around 10/25/2022) for Blood pressure follow up, Medication follow up. No future appointments. History   Patient's past medical, surgical and family histories were reviewed and updated.     Past Medical History:   Diagnosis Date    HTN (hypertension)     Subdural hemorrhage ([address] 07/2018    [facility]     Past Surgical History:   Procedure Laterality Date    HX CRANIOTOMY  07/2018     Family History   Problem Relation Age of Onset    Hypertension Mother      Social History     Tobacco Use    Smoking status: Never Smoker    Smokeless tobacco: Never Used   Substance Use Topics    Alcohol use: No    Drug use: No       Allergies   No Known Allergies

## 2022-04-25 NOTE — PROGRESS NOTES
Letty Zamarripa is a 61 y.o. male      Chief Complaint   Patient presents with    Medication Refill    Hypertension         1. Have you been to the ER, urgent care clinic since your last visit? Hospitalized since your last visit? No     2. Have you seen or consulted any other health care providers outside of the [address] [place] [address] since your last visit? Include any pap smears or colon screening.   No

## 2022-04-26 LAB
ALBUMIN/CREAT UR: <7 MG/G CREAT (ref 0–29)
BUN SERPL-MCNC: 15 MG/DL (ref 6–24)
BUN/CREAT SERPL: 14 (ref 9–20)
CALCIUM SERPL-MCNC: 9.4 MG/DL (ref 8.7–10.2)
CHLORIDE SERPL-SCNC: 102 MMOL/L (ref 96–106)
CO2 SERPL-SCNC: 23 MMOL/L (ref 20–29)
CREAT SERPL-MCNC: 1.05 MG/DL (ref 0.76–1.27)
CREAT UR-MCNC: 40.2 MG/DL
EGFR: 83 ML/MIN/1.73
GLUCOSE SERPL-MCNC: 103 MG/DL (ref 65–99)
MICROALBUMIN UR-MCNC: <3 UG/ML
POTASSIUM SERPL-SCNC: 4.6 MMOL/L (ref 3.5–5.2)
SODIUM SERPL-SCNC: 140 MMOL/L (ref 134–144)

## 2022-04-26 NOTE — PROGRESS NOTES
Your labs work is fine. The kidney function and the minerals in your blood stream are normal.  Stick to your current medications and see me in 6 months.   [facility]

## 2022-10-26 DIAGNOSIS — I10 ESSENTIAL HYPERTENSION: ICD-10-CM

## 2022-10-26 RX ORDER — HYDRALAZINE HYDROCHLORIDE 25 MG/1
25 TABLET, FILM COATED ORAL 2 TIMES DAILY
Qty: 180 TABLET | Refills: 1 | Status: SHIPPED | OUTPATIENT
Start: 2022-10-26

## 2022-10-26 RX ORDER — LISINOPRIL 40 MG/1
40 TABLET ORAL DAILY
Qty: 90 TABLET | Refills: 1 | Status: SHIPPED | OUTPATIENT
Start: 2022-10-26

## 2022-10-26 NOTE — TELEPHONE ENCOUNTER
Patient called in refill. PCP: Larry Villagran MD    Last appt: 4/25/2022  No future appointments. Requested Prescriptions     Pending Prescriptions Disp Refills    hydrALAZINE (APRESOLINE) 25 mg tablet 180 Tablet 1     Sig: Take 1 Tablet by mouth two (2) times a day. lisinopriL (PRINIVIL, ZESTRIL) 40 mg tablet 90 Tablet 1     Sig: Take 1 Tablet by mouth daily.        Prior labs and Blood pressures:  BP Readings from Last 3 Encounters:   04/25/22 137/89   04/27/21 (!) 157/93   02/04/20 155/80     Lab Results   Component Value Date/Time    Sodium 140 04/25/2022 12:00 AM    Potassium 4.6 04/25/2022 12:00 AM    Chloride 102 04/25/2022 12:00 AM    CO2 23 04/25/2022 12:00 AM    Anion gap 6 04/27/2021 09:49 AM    Glucose 103 (H) 04/25/2022 12:00 AM    BUN 15 04/25/2022 12:00 AM    Creatinine 1.05 04/25/2022 12:00 AM    BUN/Creatinine ratio 14 04/25/2022 12:00 AM    GFR est AA >60 04/27/2021 09:49 AM    GFR est non-AA >60 04/27/2021 09:49 AM    Calcium 9.4 04/25/2022 12:00 AM

## 2023-05-01 ENCOUNTER — OFFICE VISIT (OUTPATIENT)
Dept: FAMILY MEDICINE CLINIC | Age: 58
End: 2023-05-01
Payer: COMMERCIAL

## 2023-05-01 VITALS
WEIGHT: 224.4 LBS | RESPIRATION RATE: 16 BRPM | HEART RATE: 78 BPM | BODY MASS INDEX: 27.9 KG/M2 | DIASTOLIC BLOOD PRESSURE: 84 MMHG | OXYGEN SATURATION: 97 % | HEIGHT: 75 IN | SYSTOLIC BLOOD PRESSURE: 173 MMHG | TEMPERATURE: 97 F

## 2023-05-01 DIAGNOSIS — I10 WHITE COAT SYNDROME WITH HYPERTENSION: ICD-10-CM

## 2023-05-01 DIAGNOSIS — R73.01 IFG (IMPAIRED FASTING GLUCOSE): ICD-10-CM

## 2023-05-01 DIAGNOSIS — Z12.5 SCREENING FOR MALIGNANT NEOPLASM OF PROSTATE: ICD-10-CM

## 2023-05-01 DIAGNOSIS — Z53.20 COLON CANCER SCREENING DECLINED: Primary | ICD-10-CM

## 2023-05-01 DIAGNOSIS — E78.2 MIXED HYPERLIPIDEMIA: ICD-10-CM

## 2023-05-01 DIAGNOSIS — I10 ESSENTIAL HYPERTENSION: ICD-10-CM

## 2023-05-01 PROCEDURE — 3079F DIAST BP 80-89 MM HG: CPT | Performed by: FAMILY MEDICINE

## 2023-05-01 PROCEDURE — 3077F SYST BP >= 140 MM HG: CPT | Performed by: FAMILY MEDICINE

## 2023-05-01 PROCEDURE — 99214 OFFICE O/P EST MOD 30 MIN: CPT | Performed by: FAMILY MEDICINE

## 2023-05-01 RX ORDER — LISINOPRIL 40 MG/1
40 TABLET ORAL DAILY
Qty: 90 TABLET | Refills: 1 | Status: SHIPPED | OUTPATIENT
Start: 2023-05-01

## 2023-05-01 RX ORDER — HYDRALAZINE HYDROCHLORIDE 25 MG/1
25 TABLET, FILM COATED ORAL 2 TIMES DAILY
Qty: 180 TABLET | Refills: 1 | Status: SHIPPED | OUTPATIENT
Start: 2023-05-01

## 2023-05-01 NOTE — PROGRESS NOTES
1. Have you been to the ER, urgent care clinic since your last visit? Hospitalized since your last visit? No    2. Have you seen or consulted any other health care providers outside of the [address] [place] [address] since your last visit? Include any pap smears or colon screening.  No          Chief Complaint   Patient presents with    Medication Refill    Follow-up    Labs

## 2023-05-01 NOTE — PROGRESS NOTES
2023   Bruno Osgood (: 1965) is a 61 y.o. male, new patient, here for evaluation of the following chief complaint(s):  Medication Refill, Follow-up, and Labs     ASSESSMENT/PLAN:  Below is the assessment and plan developed based on review of pertinent history, physical exam, labs, studies, and medications. 1. Colon cancer screening declined  2. Essential hypertension  -     METABOLIC PANEL, COMPREHENSIVE; Future  -     URINALYSIS W/ REFLEX CULTURE; Future  -     MICROALBUMIN, UR, RAND W/ MICROALB/CREAT RATIO; Future  -     THYROID CASCADE PROFILE; Future  -     hydrALAZINE (APRESOLINE) 25 mg tablet; Take 1 Tablet by mouth two (2) times a day., Normal, Disp-180 Tablet, R-1  -     lisinopriL (PRINIVIL, ZESTRIL) 40 mg tablet; Take 1 Tablet by mouth daily. , Normal, Disp-90 Tablet, R-1  3. IFG (impaired fasting glucose)  -     CBC WITH AUTOMATED DIFF; Future  -     METABOLIC PANEL, COMPREHENSIVE; Future  -     URINALYSIS W/ REFLEX CULTURE; Future  -     HEMOGLOBIN A1C WITH EAG; Future  -     MICROALBUMIN, UR, RAND W/ MICROALB/CREAT RATIO; Future  4. Screening for malignant neoplasm of prostate  -     PSA SCREENING (SCREENING); Future  5. Mixed hyperlipidemia  -     METABOLIC PANEL, COMPREHENSIVE; Future  -     LIPID PANEL; Future  6. White coat syndrome with hypertension    Return in about 2 weeks (around 5/15/2023) for follow up. Mr. Fabricio Villareal has been given the following recommendations today due to his elevated BP reading: rescreen BP within a minimum of 2 weeks, lifestyle modifications to include: dietary sodium restriction, anti-hypertensive pharmacologic therapy ordered, and lab tests ordered. SUBJECTIVE/OBJECTIVE:  HPI     1. Colon cancer screening declined  Patient declines colon cancer screening today. Multiple options including colonoscopy, fit test, Cologuard discussed with patient today. 2. Essential hypertension  The patient presents today for HTN follow-up.    Taking medications daily w/o complications. Home BP readings range from 120s. SIde effects of meds: None  Patient trying to follow low salt diet. Lab Results   Component Value Date/Time    Sodium 140 04/25/2022 12:00 AM    Potassium 4.6 04/25/2022 12:00 AM    Chloride 102 04/25/2022 12:00 AM    CO2 23 04/25/2022 12:00 AM    Anion gap 6 04/27/2021 09:49 AM    Glucose 103 (H) 04/25/2022 12:00 AM    BUN 15 04/25/2022 12:00 AM    Creatinine 1.05 04/25/2022 12:00 AM    BUN/Creatinine ratio 14 04/25/2022 12:00 AM    GFR est AA >60 04/27/2021 09:49 AM    GFR est non-AA >60 04/27/2021 09:49 AM    Calcium 9.4 04/25/2022 12:00 AM     Lab Results   Component Value Date/Time    Microalbumin/Creat ratio (mg/g creat)  04/27/2021 09:49 AM     Cannot calculate ratio due to microalbumin result outside reportable range. Microalb/Creat ratio (ug/mg creat.) <7 04/25/2022 12:00 AM    Microalbumin,urine random <0.50 04/27/2021 09:49 AM      3. IFG (impaired fasting glucose)  Check labs  4. Screening for malignant neoplasm of prostate  Check PSA    5. Mixed hyperlipidemia  HLD  Patient presents today for hyperlipidemia. Currently not on any cholesterol medication. Lab Results   Component Value Date/Time    Cholesterol, total 182 02/04/2020 12:00 AM    HDL Cholesterol 37 (L) 02/04/2020 12:00 AM    LDL, calculated 112 (H) 02/04/2020 12:00 AM    VLDL, calculated 33 02/04/2020 12:00 AM    Triglyceride 166 (H) 02/04/2020 12:00 AM    CHOL/HDL Ratio 3.5 07/02/2018 02:42 AM        6. White coat syndrome with hypertension  Patient reports home blood pressure readings are in 120s. I have advised to keep blood pressure log. Patient reports every time he comes into doctor's office he has high blood pressure due to whitecoat anxiety. No results found for any visits on 05/01/23. Review of Systems   Constitutional: Negative. HENT: Negative. Eyes: Negative. Respiratory: Negative. Cardiovascular: Negative. Gastrointestinal: Negative. Genitourinary: Negative. Musculoskeletal: Negative. Skin: Negative. Neurological: Negative. Endo/Heme/Allergies: Negative. Psychiatric/Behavioral: Negative. Physical Exam  Vitals and nursing note reviewed. HENT:      Head: Normocephalic and atraumatic. Right Ear: External ear normal.      Left Ear: External ear normal.      Nose: Nose normal.      Mouth/Throat:      Pharynx: No oropharyngeal exudate. Eyes:      Conjunctiva/sclera: Conjunctivae normal.   Cardiovascular:      Rate and Rhythm: Normal rate and regular rhythm. Pulmonary:      Effort: Pulmonary effort is normal.      Breath sounds: Normal breath sounds. Abdominal:      General: Bowel sounds are normal. There is no distension. Palpations: Abdomen is soft. Tenderness: There is no abdominal tenderness. Musculoskeletal:         General: Normal range of motion. Cervical back: Normal range of motion and neck supple. Skin:     General: Skin is warm and dry. Neurological:      General: No focal deficit present. Mental Status: He is alert. BP (!) 173/84 (BP 1 Location: Right upper arm, BP Patient Position: Sitting, BP Cuff Size: Adult)   Pulse 78   Temp 97 °F (36.1 °C) (Temporal)   Resp 16   Ht 6' 3\" (1.905 m)   Wt 224 lb 6.4 oz (101.8 kg)   SpO2 97%   BMI 28.05 kg/m²     No Known Allergies    Current Outpatient Medications   Medication Sig    hydrALAZINE (APRESOLINE) 25 mg tablet Take 1 Tablet by mouth two (2) times a day. lisinopriL (PRINIVIL, ZESTRIL) 40 mg tablet Take 1 Tablet by mouth daily. No current facility-administered medications for this visit. Past Medical History:   Diagnosis Date    HTN (hypertension)     Subdural hemorrhage ([address] 07/2018    [facility] [place]       Past Surgical History:   Procedure Laterality Date    HX CRANIOTOMY  07/2018       Social History:  reports that he has never smoked.  He has never used smokeless tobacco. He reports that he does not drink alcohol and does not use drugs. Patient Care Team:  Sanaz Nava MD as PCP - General (Family Medicine)  Sanaz Nava MD as PCP - [facility]  Cherie Baer MD as Resident (Family Medicine)    Problem List  Date Reviewed: 5/1/2023            Codes Class Noted    Colon cancer screening declined ICD-10-CM: Z53.20  ICD-9-CM: V64.2  5/1/2023        Essential hypertension ICD-10-CM: I10  ICD-9-CM: 401.9  5/1/2023        IFG (impaired fasting glucose) ICD-10-CM: R73.01  ICD-9-CM: 790.21  5/1/2023        Mixed hyperlipidemia ICD-10-CM: E78.2  ICD-9-CM: 272.2  5/1/2023        White coat syndrome with hypertension ICD-10-CM: I10  ICD-9-CM: 401.9  9/6/2018        Subd[address] hemorrhage ([facility] ICD-10-CM: I62.00  ICD-9-CM: 432.1  8/31/2018        Subdural hematoma ([facility] [address] [facility] ICD-10-CM: S06. 5XAA  ICD-9-CM: 432.1  7/1/2018                I ADVISED PATIENT TO GO TO ER IF SYMPTOMS WORSEN , CHANGE OR FAILS TO IMPROVE. I have discussed the diagnosis with the patient and the intended plan as seen in the above orders. The patient has received an after-visit summary and questions were answered concerning future plans. I have discussed medication side effects and warnings with the patient as well. The patient agrees and understands above plan. An electronic signature was used to authenticate this note.   -- Chelsea Dean MD

## 2023-05-03 LAB
ALBUMIN SERPL-MCNC: 4.8 G/DL (ref 3.8–4.9)
ALBUMIN/CREAT UR: <9 MG/G CREAT (ref 0–29)
ALBUMIN/GLOB SERPL: 1.8 {RATIO} (ref 1.2–2.2)
ALP SERPL-CCNC: 126 IU/L (ref 44–121)
ALT SERPL-CCNC: 37 IU/L (ref 0–44)
APPEARANCE UR: CLEAR
AST SERPL-CCNC: 31 IU/L (ref 0–40)
BACTERIA #/AREA URNS HPF: NORMAL /[HPF]
BASOPHILS # BLD AUTO: 0.1 X10E3/UL (ref 0–0.2)
BASOPHILS NFR BLD AUTO: 1 %
BILIRUB SERPL-MCNC: 2 MG/DL (ref 0–1.2)
BILIRUB UR QL STRIP: NEGATIVE
BUN SERPL-MCNC: 14 MG/DL (ref 6–24)
BUN/CREAT SERPL: 15 (ref 9–20)
CALCIUM SERPL-MCNC: 9.7 MG/DL (ref 8.7–10.2)
CASTS URNS QL MICRO: NORMAL /LPF
CHLORIDE SERPL-SCNC: 99 MMOL/L (ref 96–106)
CHOLEST SERPL-MCNC: 191 MG/DL (ref 100–199)
CO2 SERPL-SCNC: 25 MMOL/L (ref 20–29)
COLOR UR: YELLOW
CREAT SERPL-MCNC: 0.95 MG/DL (ref 0.76–1.27)
CREAT UR-MCNC: 35.1 MG/DL
EGFRCR SERPLBLD CKD-EPI 2021: 93 ML/MIN/1.73
EOSINOPHIL # BLD AUTO: 0.3 X10E3/UL (ref 0–0.4)
EOSINOPHIL NFR BLD AUTO: 4 %
EPI CELLS #/AREA URNS HPF: NORMAL /HPF (ref 0–10)
ERYTHROCYTE [DISTWIDTH] IN BLOOD BY AUTOMATED COUNT: 12.8 % (ref 11.6–15.4)
EST. AVERAGE GLUCOSE BLD GHB EST-MCNC: 111 MG/DL
GLOBULIN SER CALC-MCNC: 2.6 G/DL (ref 1.5–4.5)
GLUCOSE SERPL-MCNC: 113 MG/DL (ref 70–99)
GLUCOSE UR QL STRIP: NEGATIVE
HBA1C MFR BLD: 5.5 % (ref 4.8–5.6)
HCT VFR BLD AUTO: 50.4 % (ref 37.5–51)
HDLC SERPL-MCNC: 39 MG/DL
HGB BLD-MCNC: 17.7 G/DL (ref 13–17.7)
HGB UR QL STRIP: NEGATIVE
IMM GRANULOCYTES # BLD AUTO: 0 X10E3/UL (ref 0–0.1)
IMM GRANULOCYTES NFR BLD AUTO: 0 %
KETONES UR QL STRIP: NEGATIVE
LDLC SERPL CALC-MCNC: 119 MG/DL (ref 0–99)
LEUKOCYTE ESTERASE UR QL STRIP: NEGATIVE
LYMPHOCYTES # BLD AUTO: 2.1 X10E3/UL (ref 0.7–3.1)
LYMPHOCYTES NFR BLD AUTO: 33 %
MCH RBC QN AUTO: 31.1 PG (ref 26.6–33)
MCHC RBC AUTO-ENTMCNC: 35.1 G/DL (ref 31.5–35.7)
MCV RBC AUTO: 88 FL (ref 79–97)
MICRO URNS: NORMAL
MICRO URNS: NORMAL
MICROALBUMIN UR-MCNC: <3 UG/ML
MONOCYTES # BLD AUTO: 0.7 X10E3/UL (ref 0.1–0.9)
MONOCYTES NFR BLD AUTO: 10 %
NEUTROPHILS # BLD AUTO: 3.4 X10E3/UL (ref 1.4–7)
NEUTROPHILS NFR BLD AUTO: 52 %
NITRITE UR QL STRIP: NEGATIVE
PH UR STRIP: 5.5 [PH] (ref 5–7.5)
PLATELET # BLD AUTO: 239 X10E3/UL (ref 150–450)
POTASSIUM SERPL-SCNC: 4.3 MMOL/L (ref 3.5–5.2)
PROT SERPL-MCNC: 7.4 G/DL (ref 6–8.5)
PROT UR QL STRIP: NEGATIVE
PSA SERPL-MCNC: 1.4 NG/ML (ref 0–4)
RBC # BLD AUTO: 5.7 X10E6/UL (ref 4.14–5.8)
RBC #/AREA URNS HPF: NORMAL /HPF (ref 0–2)
SODIUM SERPL-SCNC: 138 MMOL/L (ref 134–144)
SP GR UR STRIP: 1 (ref 1–1.03)
TRIGL SERPL-MCNC: 185 MG/DL (ref 0–149)
TSH SERPL DL<=0.005 MIU/L-ACNC: 1.69 UIU/ML (ref 0.45–4.5)
URINALYSIS REFLEX, 377202: NORMAL
UROBILINOGEN UR STRIP-MCNC: 0.2 MG/DL (ref 0.2–1)
VLDLC SERPL CALC-MCNC: 33 MG/DL (ref 5–40)
WBC # BLD AUTO: 6.6 X10E3/UL (ref 3.4–10.8)
WBC #/AREA URNS HPF: NORMAL /HPF (ref 0–5)

## 2023-10-09 NOTE — PROGRESS NOTES
Bedside and Verbal shift change report given to TRIXIE Olivarez (oncoming nurse) by Krishna Snell RN (offgoing nurse). Report included the following information SBAR, Kardex, Intake/Output, MAR, Accordion, Recent Results, Med Rec Status, Cardiac Rhythm NSR and Alarm Parameters      2330 Patient got nauseous after given decadron slowly, zofran given. 000 Patient began to desaturate into 70s while falling asleep, 2L NC placed on patient. 0730 Bedside and Verbal shift change report given to Herman Still RN (oncoming nurse) by TRIXIE Olivarez (offgoing nurse). Report included the following information SBAR, Kardex, Intake/Output, MAR, Accordion, Recent Results, Med Rec Status, Cardiac Rhythm NSR and Alarm Parameters . Ambulatory

## 2023-11-06 SDOH — ECONOMIC STABILITY: INCOME INSECURITY: HOW HARD IS IT FOR YOU TO PAY FOR THE VERY BASICS LIKE FOOD, HOUSING, MEDICAL CARE, AND HEATING?: NOT HARD AT ALL

## 2023-11-06 SDOH — ECONOMIC STABILITY: HOUSING INSECURITY
IN THE LAST 12 MONTHS, WAS THERE A TIME WHEN YOU DID NOT HAVE A STEADY PLACE TO SLEEP OR SLEPT IN A SHELTER (INCLUDING NOW)?: NO

## 2023-11-06 SDOH — ECONOMIC STABILITY: FOOD INSECURITY: WITHIN THE PAST 12 MONTHS, THE FOOD YOU BOUGHT JUST DIDN'T LAST AND YOU DIDN'T HAVE MONEY TO GET MORE.: NEVER TRUE

## 2023-11-06 SDOH — ECONOMIC STABILITY: TRANSPORTATION INSECURITY
IN THE PAST 12 MONTHS, HAS LACK OF TRANSPORTATION KEPT YOU FROM MEETINGS, WORK, OR FROM GETTING THINGS NEEDED FOR DAILY LIVING?: NO

## 2023-11-06 SDOH — ECONOMIC STABILITY: FOOD INSECURITY: WITHIN THE PAST 12 MONTHS, YOU WORRIED THAT YOUR FOOD WOULD RUN OUT BEFORE YOU GOT MONEY TO BUY MORE.: NEVER TRUE

## 2023-11-06 ASSESSMENT — ENCOUNTER SYMPTOMS
COUGH: 0
NAUSEA: 0
BACK PAIN: 0
VOMITING: 0
SHORTNESS OF BREATH: 0

## 2023-11-07 ENCOUNTER — OFFICE VISIT (OUTPATIENT)
Facility: CLINIC | Age: 58
End: 2023-11-07
Payer: COMMERCIAL

## 2023-11-07 VITALS
HEIGHT: 75 IN | BODY MASS INDEX: 28 KG/M2 | DIASTOLIC BLOOD PRESSURE: 86 MMHG | RESPIRATION RATE: 15 BRPM | WEIGHT: 225.2 LBS | OXYGEN SATURATION: 97 % | HEART RATE: 75 BPM | TEMPERATURE: 75 F | SYSTOLIC BLOOD PRESSURE: 164 MMHG

## 2023-11-07 DIAGNOSIS — I10 ESSENTIAL (PRIMARY) HYPERTENSION: Primary | ICD-10-CM

## 2023-11-07 DIAGNOSIS — E78.2 MIXED HYPERLIPIDEMIA: ICD-10-CM

## 2023-11-07 DIAGNOSIS — R74.8 ELEVATED LIVER ENZYMES: ICD-10-CM

## 2023-11-07 DIAGNOSIS — I10 WHITE COAT SYNDROME WITH DIAGNOSIS OF HYPERTENSION: ICD-10-CM

## 2023-11-07 DIAGNOSIS — Z86.79 HISTORY OF SUBDURAL HEMATOMA: ICD-10-CM

## 2023-11-07 DIAGNOSIS — R73.01 IMPAIRED FASTING GLUCOSE: ICD-10-CM

## 2023-11-07 PROCEDURE — 3079F DIAST BP 80-89 MM HG: CPT | Performed by: FAMILY MEDICINE

## 2023-11-07 PROCEDURE — 99213 OFFICE O/P EST LOW 20 MIN: CPT | Performed by: FAMILY MEDICINE

## 2023-11-07 PROCEDURE — 3077F SYST BP >= 140 MM HG: CPT | Performed by: FAMILY MEDICINE

## 2023-11-07 RX ORDER — LISINOPRIL 40 MG/1
40 TABLET ORAL DAILY
Qty: 90 TABLET | Refills: 1 | Status: SHIPPED | OUTPATIENT
Start: 2023-11-07

## 2023-11-07 RX ORDER — HYDRALAZINE HYDROCHLORIDE 25 MG/1
25 TABLET, FILM COATED ORAL 2 TIMES DAILY
Qty: 180 TABLET | Refills: 1 | Status: SHIPPED | OUTPATIENT
Start: 2023-11-07

## 2023-11-07 NOTE — PATIENT INSTRUCTIONS
Having high blood pressure is very hard on your heart over time. It means the force of blood flowing through your vessels is too high, and it can damage your heart leading to things like \"afib\" or an irregular rhythm, a heart attack, or a stroke. Most people have NO symptoms, so even though you feel fine it may still be harming your body. High blood pressure is often called \"the silent killer. \" A FULLY NORMAL blood pressure should be less than 120 over 80. There's a lot you can do without medicine, like keep a balanced diet with LOW SALT, limit alcohol, STOP SMOKING, manage stress, walk daily, and lose even 5-10 pounds.  A great resource about your BP - [employer]

## 2023-11-07 NOTE — PROGRESS NOTES
Assessment/Plan:     Diagnoses and all orders for this visit:    1. Essential (primary) hypertension  Patient has prior diagnosis of whitecoat hypertension in addition to hypertension. Encouraged continued home monitoring and agrees to reach out to clinic if persistently greater than 408 systolic.  - hydrALAZINE (APRESOLINE) 25 MG tablet; Take 1 tablet by mouth 2 times daily  Dispense: 180 tablet; Refill: 1  - lisinopril (PRINIVIL;ZESTRIL) 40 MG tablet; Take 1 tablet by mouth daily  Dispense: 90 tablet; Refill: 1    2. Impaired fasting glucose  Previously well controlled, advised carbohydrate control and continued physical activity    3. Mixed hyperlipidemia  Stable with statin; we will continue    4. White coat syndrome with diagnosis of hypertension  Has Omron cuff at home and agrees for home monitoring    5. Elevated liver enzymes  Previously noted and labs in May. Patient denies any symptoms at this time. Declines recheck today, will recheck at appointment in April with Dr. Rosa M Hickey. Patient agrees to reach out should symptoms develop. Discussed liver numbers can be elevated in setting of high triglycerides, encouraged Mediterranean-style diet    6. History of subdural hematoma  No longer following with neurology, denies any new or worsening symptoms. Return for Keep follow up with Dr. Rosa M Hickey in April. Discussed expected course/resolution/complications of diagnosis in detail with patient. Medication risks/benefits/costs/interactions/alternatives discussed with patient. Pt expressed understanding with the diagnosis and plan      Subjective:      CC  Enedina Wilkerson is a 60 y.o. male who presents for had concerns including Hypertension (Mr. Bhumika Tinajero is a 61 y/o male who presents for a follow up on HTN. He reports good compliance with medications and denies any side effects. ). HPI  Enedina Wilekrson is a 60 y.o. male who presents for follow up of HTN.  He reports using an Omron cuff at home and

## 2024-04-01 ENCOUNTER — OFFICE VISIT (OUTPATIENT)
Facility: CLINIC | Age: 59
End: 2024-04-01
Payer: COMMERCIAL

## 2024-04-01 VITALS
RESPIRATION RATE: 20 BRPM | SYSTOLIC BLOOD PRESSURE: 148 MMHG | HEART RATE: 69 BPM | TEMPERATURE: 97.3 F | WEIGHT: 218.8 LBS | DIASTOLIC BLOOD PRESSURE: 77 MMHG | HEIGHT: 75 IN | BODY MASS INDEX: 27.21 KG/M2 | OXYGEN SATURATION: 97 %

## 2024-04-01 DIAGNOSIS — Z53.20 COLON CANCER SCREENING DECLINED: ICD-10-CM

## 2024-04-01 DIAGNOSIS — I10 ESSENTIAL (PRIMARY) HYPERTENSION: Primary | ICD-10-CM

## 2024-04-01 DIAGNOSIS — Z12.5 SCREENING FOR MALIGNANT NEOPLASM OF PROSTATE: ICD-10-CM

## 2024-04-01 DIAGNOSIS — E78.2 MIXED HYPERLIPIDEMIA: ICD-10-CM

## 2024-04-01 PROCEDURE — 3077F SYST BP >= 140 MM HG: CPT | Performed by: FAMILY MEDICINE

## 2024-04-01 PROCEDURE — 99213 OFFICE O/P EST LOW 20 MIN: CPT | Performed by: FAMILY MEDICINE

## 2024-04-01 PROCEDURE — 3078F DIAST BP <80 MM HG: CPT | Performed by: FAMILY MEDICINE

## 2024-04-01 RX ORDER — HYDRALAZINE HYDROCHLORIDE 25 MG/1
25 TABLET, FILM COATED ORAL 2 TIMES DAILY
Qty: 180 TABLET | Refills: 1 | Status: SHIPPED | OUTPATIENT
Start: 2024-04-01

## 2024-04-01 RX ORDER — LISINOPRIL 40 MG/1
40 TABLET ORAL DAILY
Qty: 90 TABLET | Refills: 1 | Status: SHIPPED | OUTPATIENT
Start: 2024-04-01

## 2024-04-01 ASSESSMENT — PATIENT HEALTH QUESTIONNAIRE - PHQ9
SUM OF ALL RESPONSES TO PHQ QUESTIONS 1-9: 0
1. LITTLE INTEREST OR PLEASURE IN DOING THINGS: NOT AT ALL
2. FEELING DOWN, DEPRESSED OR HOPELESS: NOT AT ALL
SUM OF ALL RESPONSES TO PHQ9 QUESTIONS 1 & 2: 0

## 2024-04-01 ASSESSMENT — ENCOUNTER SYMPTOMS
BLOOD IN STOOL: 0
SHORTNESS OF BREATH: 0

## 2024-04-01 NOTE — PROGRESS NOTES
dentified pt with two pt identifiers(name and ).    Chief Complaint   Patient presents with    Follow-up     Patient here for a 6 months follow up.        Health Maintenance Due   Topic    Hepatitis B vaccine (1 of 3 - 3-dose series)    COVID-19 Vaccine (1)    HIV screen     DTaP/Tdap/Td vaccine (1 - Tdap)    Colorectal Cancer Screen     Shingles vaccine (1 of 2)    A1C test (Diabetic or Prediabetic)     Depression Screen        Wt Readings from Last 3 Encounters:   24 99.2 kg (218 lb 12.8 oz)   23 102.2 kg (225 lb 3.2 oz)   23 101.8 kg (224 lb 6.4 oz)     Temp Readings from Last 3 Encounters:   24 97.3 °F (36.3 °C) (Temporal)   23 (!) 75 °F (23.9 °C) (Temporal)     BP Readings from Last 3 Encounters:   24 (!) 143/84   23 (!) 164/86   23 (!) 173/84     Pulse Readings from Last 3 Encounters:   24 69   23 75   23 78           Coordination of Care Questionnaire:  :   1. \"Have you been to the ER, urgent care clinic since your last visit?  Hospitalized since your last visit?\" no    2. \"Have you seen or consulted any other health care providers outside of the [facility] since your last visit?\" no     3. For patients aged 45-75: Has the patient had a colonoscopy / FIT/ Cologuard? no      If the patient is female:    4. For patients aged 40-74: Has the patient had a mammogram within the past 2 years? no      5. For patients aged 21-65: Has the patient had a pap smear? no     3) Do you have an Advance Directive on file? no  Are you interested in receiving information about Advance Directives? no    Patient is accompanied by self I have received verbal consent from Gómez Del Rio to discuss any/all medical information while they are present in the room.   
hydrALAZINE (APRESOLINE) 25 MG tablet REORDER    lisinopril (PRINIVIL;ZESTRIL) 40 MG tablet REORDER     ~~~~~~~~~~~~~~~~~~~~~~~~~~~~~~~~~~~~~~~~~~~~~~~~~~~~~~~~~~~    Chief Complaint   Patient presents with    Follow-up     Patient here for a 6 months follow up.       History provided by patient  History of Present Illness   Gómez Del Rio is a 58 y.o. male who presents to clinic today for:  Follow-up (Patient here for a 6 months follow up.)    BP  Takes meds consistently  Rarely misses an evening dose.  This /72  That's about average for what he is seeing.    Again declines colonoscopy    Stays active.  Non smoker.  No drugs or ETOH>    Health Maintenance  Patient refusal, colonoscopy  Health Maintenance Due   Topic Date Due    Hepatitis B vaccine (1 of 3 - 3-dose series) Never done    COVID-19 Vaccine (1) Never done    HIV screen  Never done    DTaP/Tdap/Td vaccine (1 - Tdap) Never done    Colorectal Cancer Screen  Never done    Shingles vaccine (1 of 2) Never done    A1C test (Diabetic or Prediabetic)  05/01/2024    Depression Screen  05/01/2024     Review of Systems   Review of Systems   Respiratory:  Negative for shortness of breath.    Cardiovascular:  Negative for chest pain.   Gastrointestinal:  Negative for blood in stool.   Genitourinary:  Negative for hematuria.   Psychiatric/Behavioral: Negative.            Vitals/Objective:     Vitals:    04/01/24 1047 04/01/24 1055   BP: (!) 143/84 (!) 148/77   Site: Left Upper Arm    Position: Sitting    Cuff Size: Large Adult    Pulse: 69    Resp: 20    Temp: 97.3 °F (36.3 °C)    TempSrc: Temporal    SpO2: 97%    Weight: 99.2 kg (218 lb 12.8 oz)    Height: 1.905 m (6' 3\")      Body mass index is 27.35 kg/m².    Wt Readings from Last 3 Encounters:   04/01/24 99.2 kg (218 lb 12.8 oz)   11/07/23 102.2 kg (225 lb 3.2 oz)   05/01/23 101.8 kg (224 lb 6.4 oz)         Objective  Physical Exam  Constitutional:       Appearance: Normal appearance.   HENT:      Head:

## 2024-04-02 LAB
ALBUMIN SERPL-MCNC: 4.8 G/DL (ref 3.8–4.9)
ALBUMIN/CREAT UR: <7 MG/G CREAT (ref 0–29)
ALP SERPL-CCNC: 125 IU/L (ref 44–121)
ALT SERPL-CCNC: 31 IU/L (ref 0–44)
AST SERPL-CCNC: 26 IU/L (ref 0–40)
BILIRUB DIRECT SERPL-MCNC: 0.3 MG/DL (ref 0–0.4)
BILIRUB SERPL-MCNC: 1.5 MG/DL (ref 0–1.2)
BUN SERPL-MCNC: 15 MG/DL (ref 6–24)
BUN/CREAT SERPL: 17 (ref 9–20)
CALCIUM SERPL-MCNC: 9.8 MG/DL (ref 8.7–10.2)
CHLORIDE SERPL-SCNC: 102 MMOL/L (ref 96–106)
CHOLEST SERPL-MCNC: 154 MG/DL (ref 100–199)
CO2 SERPL-SCNC: 23 MMOL/L (ref 20–29)
CREAT SERPL-MCNC: 0.9 MG/DL (ref 0.76–1.27)
CREAT UR-MCNC: 41.2 MG/DL
EGFRCR SERPLBLD CKD-EPI 2021: 98 ML/MIN/1.73
GLUCOSE SERPL-MCNC: 102 MG/DL (ref 70–99)
HDLC SERPL-MCNC: 41 MG/DL
LDLC SERPL CALC-MCNC: 91 MG/DL (ref 0–99)
MICROALBUMIN UR-MCNC: <3 UG/ML
POTASSIUM SERPL-SCNC: 4.5 MMOL/L (ref 3.5–5.2)
PROT SERPL-MCNC: 7.3 G/DL (ref 6–8.5)
PSA SERPL-MCNC: 1.1 NG/ML (ref 0–4)
SODIUM SERPL-SCNC: 141 MMOL/L (ref 134–144)
TRIGL SERPL-MCNC: 119 MG/DL (ref 0–149)
VLDLC SERPL CALC-MCNC: 22 MG/DL (ref 5–40)

## 2024-10-01 ENCOUNTER — OFFICE VISIT (OUTPATIENT)
Facility: CLINIC | Age: 59
End: 2024-10-01
Payer: COMMERCIAL

## 2024-10-01 VITALS
RESPIRATION RATE: 16 BRPM | OXYGEN SATURATION: 100 % | WEIGHT: 213.2 LBS | HEIGHT: 75 IN | SYSTOLIC BLOOD PRESSURE: 140 MMHG | TEMPERATURE: 98.6 F | DIASTOLIC BLOOD PRESSURE: 88 MMHG | BODY MASS INDEX: 26.51 KG/M2 | HEART RATE: 65 BPM

## 2024-10-01 DIAGNOSIS — I10 ESSENTIAL (PRIMARY) HYPERTENSION: ICD-10-CM

## 2024-10-01 PROCEDURE — 3077F SYST BP >= 140 MM HG: CPT | Performed by: FAMILY MEDICINE

## 2024-10-01 PROCEDURE — 99213 OFFICE O/P EST LOW 20 MIN: CPT | Performed by: FAMILY MEDICINE

## 2024-10-01 PROCEDURE — 3079F DIAST BP 80-89 MM HG: CPT | Performed by: FAMILY MEDICINE

## 2024-10-01 RX ORDER — LISINOPRIL 40 MG/1
40 TABLET ORAL DAILY
Qty: 90 TABLET | Refills: 1 | Status: SHIPPED | OUTPATIENT
Start: 2024-10-01

## 2024-10-01 RX ORDER — HYDRALAZINE HYDROCHLORIDE 25 MG/1
25 TABLET, FILM COATED ORAL 2 TIMES DAILY
Qty: 180 TABLET | Refills: 1 | Status: SHIPPED | OUTPATIENT
Start: 2024-10-01

## 2024-10-01 ASSESSMENT — ENCOUNTER SYMPTOMS
SHORTNESS OF BREATH: 0
GASTROINTESTINAL NEGATIVE: 1
BLOOD IN STOOL: 0

## 2024-10-01 NOTE — PROGRESS NOTES
Identified pt with two pt identifiers(name and ).      Coordination of Care Questionnaire:  :   1. \"Have you been to the ER, urgent care clinic since your last visit?  No  Hospitalized since your last visit?\" No     2. \"Have you seen or consulted any other health care providers outside of the [facility] since your last visit?\"  No     I have received verbal consent from  patient  to discuss any/all medical information while they are present in the room.    Reviewed record in preparation for visit and have obtained necessary documentation.  Medication reconciliation up to date and corrected with patient at this time.        
Content: Thought content normal.         Judgment: Judgment normal.            No results found for this or any previous visit (from the past 24 hour(s)).   Disposition   Return in about 6 months (around 4/1/2025).   History   Patient's past medical, surgical and family histories were reviewed and updated.    Past Medical History:   Diagnosis Date    HTN (hypertension)     Subdural hemorrhage ([facility]) 07/2018    Gananda's     Past Surgical History:   Procedure Laterality Date    CRANIOTOMY  07/2018      No relevant family history has been documented for this patient.   Social History       Tobacco History       Smoking Status  Never      Smokeless Tobacco Use  Never              Alcohol History       Alcohol Use Status  No              Drug Use       Drug Use Status  No              Sexual Activity       Sexually Active  Not Asked                     Allergies   No Known Allergies

## 2025-03-31 SDOH — ECONOMIC STABILITY: TRANSPORTATION INSECURITY
IN THE PAST 12 MONTHS, HAS THE LACK OF TRANSPORTATION KEPT YOU FROM MEDICAL APPOINTMENTS OR FROM GETTING MEDICATIONS?: PATIENT DECLINED

## 2025-03-31 SDOH — ECONOMIC STABILITY: FOOD INSECURITY: WITHIN THE PAST 12 MONTHS, THE FOOD YOU BOUGHT JUST DIDN'T LAST AND YOU DIDN'T HAVE MONEY TO GET MORE.: PATIENT DECLINED

## 2025-03-31 SDOH — ECONOMIC STABILITY: INCOME INSECURITY: IN THE LAST 12 MONTHS, WAS THERE A TIME WHEN YOU WERE NOT ABLE TO PAY THE MORTGAGE OR RENT ON TIME?: PATIENT DECLINED

## 2025-03-31 SDOH — ECONOMIC STABILITY: TRANSPORTATION INSECURITY
IN THE PAST 12 MONTHS, HAS LACK OF TRANSPORTATION KEPT YOU FROM MEETINGS, WORK, OR FROM GETTING THINGS NEEDED FOR DAILY LIVING?: PATIENT DECLINED

## 2025-03-31 SDOH — ECONOMIC STABILITY: FOOD INSECURITY: WITHIN THE PAST 12 MONTHS, YOU WORRIED THAT YOUR FOOD WOULD RUN OUT BEFORE YOU GOT MONEY TO BUY MORE.: PATIENT DECLINED

## 2025-04-01 ENCOUNTER — OFFICE VISIT (OUTPATIENT)
Facility: CLINIC | Age: 60
End: 2025-04-01

## 2025-04-01 VITALS
WEIGHT: 209 LBS | SYSTOLIC BLOOD PRESSURE: 132 MMHG | RESPIRATION RATE: 17 BRPM | HEIGHT: 75 IN | OXYGEN SATURATION: 98 % | DIASTOLIC BLOOD PRESSURE: 75 MMHG | HEART RATE: 62 BPM | TEMPERATURE: 97.1 F | BODY MASS INDEX: 25.99 KG/M2

## 2025-04-01 DIAGNOSIS — I10 ESSENTIAL (PRIMARY) HYPERTENSION: ICD-10-CM

## 2025-04-01 PROBLEM — S06.5XAA SUBDURAL HEMATOMA (HCC): Status: RESOLVED | Noted: 2018-07-01 | Resolved: 2025-04-01

## 2025-04-01 PROCEDURE — 3075F SYST BP GE 130 - 139MM HG: CPT | Performed by: FAMILY MEDICINE

## 2025-04-01 PROCEDURE — 3078F DIAST BP <80 MM HG: CPT | Performed by: FAMILY MEDICINE

## 2025-04-01 PROCEDURE — 99213 OFFICE O/P EST LOW 20 MIN: CPT | Performed by: FAMILY MEDICINE

## 2025-04-01 RX ORDER — HYDRALAZINE HYDROCHLORIDE 25 MG/1
25 TABLET, FILM COATED ORAL 2 TIMES DAILY
Qty: 180 TABLET | Refills: 1 | Status: SHIPPED | OUTPATIENT
Start: 2025-04-01

## 2025-04-01 RX ORDER — LISINOPRIL 40 MG/1
40 TABLET ORAL DAILY
Qty: 90 TABLET | Refills: 1 | Status: SHIPPED | OUTPATIENT
Start: 2025-04-01

## 2025-04-01 SDOH — ECONOMIC STABILITY: FOOD INSECURITY: WITHIN THE PAST 12 MONTHS, YOU WORRIED THAT YOUR FOOD WOULD RUN OUT BEFORE YOU GOT MONEY TO BUY MORE.: NEVER TRUE

## 2025-04-01 SDOH — ECONOMIC STABILITY: FOOD INSECURITY: WITHIN THE PAST 12 MONTHS, THE FOOD YOU BOUGHT JUST DIDN'T LAST AND YOU DIDN'T HAVE MONEY TO GET MORE.: NEVER TRUE

## 2025-04-01 ASSESSMENT — ENCOUNTER SYMPTOMS
SHORTNESS OF BREATH: 0
BLOOD IN STOOL: 0

## 2025-04-01 NOTE — PROGRESS NOTES
Gómez Del Rio  59 y.o. male  1965  MRN:[id number]  [facility]  Progress Note     Encounter Date: 4/1/2025    Assessment and Plan:       ICD-10-CM    1. Essential (primary) hypertension  I10 lisinopril (PRINIVIL;ZESTRIL) 40 MG tablet     hydrALAZINE (APRESOLINE) 25 MG tablet     Basic Metabolic Panel     Hepatic Function Panel     Albumin/Creatinine Ratio, Urine     Lipid Panel        1. Essential (primary) hypertension  Doing well, BP at goal  Continue meds  Update labs  Patient willing to do labs but again declines cancer screening for both prostate and colon CA.  -     lisinopril (PRINIVIL;ZESTRIL) 40 MG tablet; Take 1 tablet by mouth daily, Disp-90 tablet, R-1Normal  -     hydrALAZINE (APRESOLINE) 25 MG tablet; Take 1 tablet by mouth 2 times daily, Disp-180 tablet, R-1Normal  -     Basic Metabolic Panel; Future  -     Hepatic Function Panel; Future  -     Albumin/Creatinine Ratio, Urine; Future  -     Lipid Panel; Future       I have discussed the diagnosis with the patient and the intended plan as seen in the above orders.  he has expressed understanding.  The patient has received an after-visit summary and questions were answered concerning future plans.  I have discussed medication side effects and warnings with the patient as well.    Electronically Signed: Akshat Pal MD    Current Medications after this visit     Current Outpatient Medications   Medication Sig    lisinopril (PRINIVIL;ZESTRIL) 40 MG tablet Take 1 tablet by mouth daily    hydrALAZINE (APRESOLINE) 25 MG tablet Take 1 tablet by mouth 2 times daily     No current facility-administered medications for this visit.     Medications Discontinued During This Encounter   Medication Reason    lisinopril (PRINIVIL;ZESTRIL) 40 MG tablet REORDER    hydrALAZINE (APRESOLINE) 25 MG tablet REORDER     ~~~~~~~~~~~~~~~~~~~~~~~~~~~~~~~~~~~~~~~~~~~~~~~~~~~~~~~~~~~    Chief Complaint   Patient presents with    Follow-up

## 2025-04-02 ENCOUNTER — RESULTS FOLLOW-UP (OUTPATIENT)
Facility: CLINIC | Age: 60
End: 2025-04-02

## 2025-04-02 LAB
ALBUMIN SERPL-MCNC: 4.4 G/DL (ref 3.5–5)
ALBUMIN/GLOB SERPL: 1.4 (ref 1.1–2.2)
ALP SERPL-CCNC: 109 U/L (ref 45–117)
ALT SERPL-CCNC: 36 U/L (ref 12–78)
ANION GAP SERPL CALC-SCNC: 6 MMOL/L (ref 2–12)
AST SERPL-CCNC: 31 U/L (ref 15–37)
BILIRUB DIRECT SERPL-MCNC: 0.4 MG/DL (ref 0–0.2)
BILIRUB SERPL-MCNC: 2.2 MG/DL (ref 0.2–1)
BUN SERPL-MCNC: 15 MG/DL (ref 6–20)
BUN/CREAT SERPL: 17 (ref 12–20)
CALCIUM SERPL-MCNC: 9.4 MG/DL (ref 8.5–10.1)
CHLORIDE SERPL-SCNC: 105 MMOL/L (ref 97–108)
CHOLEST SERPL-MCNC: 196 MG/DL
CO2 SERPL-SCNC: 27 MMOL/L (ref 21–32)
CREAT SERPL-MCNC: 0.9 MG/DL (ref 0.7–1.3)
CREAT UR-MCNC: 21.4 MG/DL
GLOBULIN SER CALC-MCNC: 3.2 G/DL (ref 2–4)
GLUCOSE SERPL-MCNC: 101 MG/DL (ref 65–100)
HDLC SERPL-MCNC: 53 MG/DL
HDLC SERPL: 3.7 (ref 0–5)
LDLC SERPL CALC-MCNC: 120.6 MG/DL (ref 0–100)
MICROALBUMIN UR-MCNC: <0.5 MG/DL
MICROALBUMIN/CREAT UR-RTO: <23 MG/G (ref 0–30)
POTASSIUM SERPL-SCNC: 4.1 MMOL/L (ref 3.5–5.1)
PROT SERPL-MCNC: 7.6 G/DL (ref 6.4–8.2)
SODIUM SERPL-SCNC: 138 MMOL/L (ref 136–145)
TRIGL SERPL-MCNC: 112 MG/DL
VLDLC SERPL CALC-MCNC: 22.4 MG/DL

## 2025-08-11 ENCOUNTER — COMMUNITY OUTREACH (OUTPATIENT)
Facility: CLINIC | Age: 60
End: 2025-08-11

## (undated) DEVICE — RUBBERBAND FASTENING W0.25XL3.5IN 5 PER PK

## (undated) DEVICE — TOOL F2/8TA23 LEGEND 8CM 2.3MM TAPER: Brand: MIDAS REX™

## (undated) DEVICE — 1200 GUARD II KIT W/5MM TUBE W/O VAC TUBE: Brand: GUARDIAN

## (undated) DEVICE — KENDALL SCD EXPRESS SLEEVES, KNEE LENGTH, MEDIUM: Brand: KENDALL SCD

## (undated) DEVICE — SOLUTION IV 250ML 0.9% SOD CHL CLR INJ FLX BG CONT PRT CLSR

## (undated) DEVICE — SUTURE VCRL SZ 0 L18IN ABSRB VLT L36MM CT-1 1/2 CIR J740D

## (undated) DEVICE — INFECTION CONTROL KIT SYS

## (undated) DEVICE — TOOL 8TD126 LEGEND 8CM 1.2MM 6MM DEPTH: Brand: MIDAS REX ™

## (undated) DEVICE — DRAIN KT WND 10FR RND 400ML --

## (undated) DEVICE — STERILE POLYISOPRENE POWDER-FREE SURGICAL GLOVES WITH EMOLLIENT COATING: Brand: PROTEXIS

## (undated) DEVICE — TOOL 9AC90 LEGEND 9CM 9MM AC: Brand: MIDAS REX

## (undated) DEVICE — DRAPE FLD WRM W44XL66IN C6L FOR INTRATEMP SYS THERMABASIN

## (undated) DEVICE — SUTURE VCRL SZ 2-0 L18IN ABSRB UD L26MM CP-2 1/2 CIR REV J762D

## (undated) DEVICE — KIT CG8901 CLIP GUN 10 PK: Brand: CLIP GUN

## (undated) DEVICE — SOLUTION IV 1000ML 0.9% SOD CHL

## (undated) DEVICE — DEVON™ KNEE AND BODY STRAP 60" X 3" (1.5 M X 7.6 CM): Brand: DEVON

## (undated) DEVICE — AGENT HEMSTAT W2XL14IN OXIDIZED REGENERATED CELOS ABSRB FOR

## (undated) DEVICE — CODMAN® SURGICAL PATTIES 1/4" X 1/4" (0.64CM X 0.64CM): Brand: CODMAN®

## (undated) DEVICE — 3000CC GUARDIAN II: Brand: GUARDIAN

## (undated) DEVICE — SUTURE NRLN SZ 4-0 L18IN NONABSORBABLE BLK L13MM TF 1/2 CIR C584D

## (undated) DEVICE — SURGICAL PROCEDURE KIT CRANIOTOMY

## (undated) DEVICE — CODMAN® SURGICAL PATTIES 1/2" X 3" (1.27CM X 7.62CM): Brand: CODMAN®

## (undated) DEVICE — STOCKINETTE TUBE BLN 2PLY 6X72 -- MEDICHOICE CONVERT TO 363488

## (undated) DEVICE — HANDLE LT SNAP ON ULT DURABLE LENS FOR TRUMPF ALC DISPOSABLE

## (undated) DEVICE — TOOL 8TA11 LEGEND 8CM 1.1MM TA: Brand: MIDAS REX ™

## (undated) DEVICE — FLOSEAL MATRIX IS INDICATED IN SURGICAL PROCEDURES (OTHER THAN IN OPHTHALMIC) AS AN ADJUNCT TO HEMOSTASIS WHEN CONTROL OF BLEEDING BY LIGATURE OR CONVENTIONALPROCEDURES IS INEFFECTIVE OR IMPRACTICAL.: Brand: FLOSEAL HEMOSTATIC MATRIX